# Patient Record
Sex: FEMALE | Race: WHITE | NOT HISPANIC OR LATINO | Employment: FULL TIME
[De-identification: names, ages, dates, MRNs, and addresses within clinical notes are randomized per-mention and may not be internally consistent; named-entity substitution may affect disease eponyms.]

---

## 2017-06-03 ENCOUNTER — HEALTH MAINTENANCE LETTER (OUTPATIENT)
Age: 57
End: 2017-06-03

## 2017-06-21 ENCOUNTER — TELEPHONE (OUTPATIENT)
Dept: PEDIATRICS | Facility: CLINIC | Age: 57
End: 2017-06-21

## 2017-06-21 ENCOUNTER — OFFICE VISIT (OUTPATIENT)
Dept: PEDIATRICS | Facility: CLINIC | Age: 57
End: 2017-06-21
Payer: COMMERCIAL

## 2017-06-21 ENCOUNTER — HOSPITAL ENCOUNTER (OUTPATIENT)
Dept: ULTRASOUND IMAGING | Facility: CLINIC | Age: 57
Discharge: HOME | End: 2017-06-21
Attending: PHYSICIAN ASSISTANT | Admitting: PHYSICIAN ASSISTANT
Payer: COMMERCIAL

## 2017-06-21 ENCOUNTER — NURSE TRIAGE (OUTPATIENT)
Dept: NURSING | Facility: CLINIC | Age: 57
End: 2017-06-21

## 2017-06-21 VITALS
WEIGHT: 293 LBS | OXYGEN SATURATION: 95 % | HEART RATE: 83 BPM | SYSTOLIC BLOOD PRESSURE: 156 MMHG | RESPIRATION RATE: 16 BRPM | HEIGHT: 68 IN | BODY MASS INDEX: 44.41 KG/M2 | TEMPERATURE: 97.9 F | DIASTOLIC BLOOD PRESSURE: 66 MMHG

## 2017-06-21 DIAGNOSIS — M79.89 SWELLING OF LEFT LOWER EXTREMITY: Primary | ICD-10-CM

## 2017-06-21 DIAGNOSIS — M79.89 SWELLING OF LEFT LOWER EXTREMITY: ICD-10-CM

## 2017-06-21 DIAGNOSIS — I80.02 THROMBOPHLEBITIS OF SUPERFICIAL VEINS OF LEFT LOWER EXTREMITY: ICD-10-CM

## 2017-06-21 PROCEDURE — 93971 EXTREMITY STUDY: CPT | Mod: LT

## 2017-06-21 PROCEDURE — 99214 OFFICE O/P EST MOD 30 MIN: CPT | Performed by: PHYSICIAN ASSISTANT

## 2017-06-21 NOTE — NURSING NOTE
"No chief complaint on file.      Initial /66 (BP Location: Right arm, Patient Position: Chair, Cuff Size: Adult Large)  Pulse 83  Temp 97.9  F (36.6  C) (Oral)  Resp 16  LMP 07/09/2007  SpO2 95% Estimated body mass index is 45.15 kg/(m^2) as calculated from the following:    Height as of 1/23/08: 5' 8.25\" (1.734 m).    Weight as of 1/23/08: 299 lb 2 oz (135.7 kg).  Medication Reconciliation: complete   Chiquis Dueñas MA      "

## 2017-06-21 NOTE — TELEPHONE ENCOUNTER
US is NEGATIVE for DVT. Superficial thrombophlebitis noted (measuing 4 cm).   Proceed with consistent NSAID use (600 mg four times daily), heat and elevation.   Follow up with PMD in 5 days--appointment scheduled.   Myles Cage PA-C

## 2017-06-21 NOTE — TELEPHONE ENCOUNTER
"Patient described a 3\" X -6\" red, warm to touch area on (L) thigh. Also, veins near area are red in color. Rated pain \"1\" on a scale from 0-10. Denies any injury.   Reason for Disposition    [1] Red area or streak AND [2] large (> 2 in. or 5 cm)    Additional Information    Negative: Looks like a broken bone or dislocated joint (e.g., crooked or deformed)    Negative: Sounds like a life-threatening emergency to the triager    Negative: Followed a leg injury    Negative: Leg swelling is main symptom    Negative: Back pain radiating (shooting) into leg(s)    Negative: Knee pain is main symptom    Negative: Ankle pain is main symptom    Negative: Pregnant    Negative: Chest pain    Negative: Difficulty breathing    Negative: Entire foot is cool or blue in comparison to other side    Negative: Unable to walk    Negative: [1] Red area or streak AND [2] fever    Negative: [1] Swollen joint AND [2] fever    Negative: [1] Cast on leg or ankle AND [2] now increased pain    Negative: Patient sounds very sick or weak to the triager    Negative: [1] SEVERE pain (e.g., excruciating, unable to do any normal activities) AND [2] not improved after 2 hours of pain medicine    Negative: [1] Thigh or calf pain AND [2] only 1 side AND [3] present > 1 hour    Negative: [1] Thigh, calf, or ankle swelling AND [2] only 1 side    Negative: [1] Thigh, calf, or ankle swelling AND [2] bilateral AND [3] 1 side is more swollen    Protocols used: LEG PAIN-ADULT-    "

## 2017-06-21 NOTE — TELEPHONE ENCOUNTER
Called and spoke with patient. Advised patient to apply heat to site 4-6 times a day for 15-20 minutes. In addition to elevate area. Chiquis Dueñas MA

## 2017-06-21 NOTE — MR AVS SNAPSHOT
After Visit Summary   6/21/2017    Katharina Mejias    MRN: [id number]           Patient Information     Date Of Birth          1960        Visit Information        Provider Department      6/21/2017 11:10 AM Myles Cage PA-C [facility]        Today's Diagnoses     Swelling of left lower extremity    -  1    Thrombophlebitis of superficial veins of left lower extremity          Care Instructions    Proceed to [facility] for US.          Follow-ups after your visit        Your next 10 appointments already scheduled     Jun 21, 2017  2:40 PM CDT   US LOWER EXTREMITY VENOUS DUPLEX LEFT with [id number]   [facility] [place] ([facility] [place] [facility])    [address]  [place] [address] [phone]           Please bring a list of your medicines (including vitamins, minerals and over-the-counter drugs). Also, tell your doctor about any allergies you may have. Wear comfortable clothes and leave your valuables at home.  You do not need to do anything special to prepare for your exam.  Please call the Imaging Department at your exam site with any questions.              Future tests that were ordered for you today     Open Future Orders        Priority Expected Expires Ordered    US Lower Extremity Venous Duplex Left STAT  6/21/2018 6/21/2017            Who to contact     If you have questions or need follow up information about today's clinic visit or your schedule please contact [facility] directly at [phone].  Normal or non-critical lab and imaging results will be communicated to you by MyChart, letter or phone within 4 business days after the clinic has received the results. If you do not hear from us within 7 days, please contact the clinic through MyChart or phone. If you have a critical or abnormal lab result, we will notify you by phone as soon as possible.  Submit refill requests through [employer] or call your  "pharmacy and they will forward the refill request to us. Please allow 3 business days for your refill to be completed.          Additional Information About Your Visit        MyChart Information     [email or web address] gives you secure access to your electronic health record. If you see a primary care provider, you can also send messages to your care team and make appointments. If you have questions, please call your primary care clinic.  If you do not have a primary care provider, please call [phone] and they will assist you.        Care EveryWhere ID     This is your Care EveryWhere ID. This could be used by other organizations to access your [place] medical records  [id number]        Your Vitals Were     Pulse Temperature Respirations Height Last Period Pulse Oximetry    83 97.9  F (36.6  C) (Oral) 16 5' 8\" (1.727 m) 07/09/2007 95%    BMI (Body Mass Index)                   51.58 kg/m2            Blood Pressure from Last 3 Encounters:   06/21/17 156/66   09/08/14 130/80   01/23/08 126/82    Weight from Last 3 Encounters:   06/21/17 (!) 339 lb 3.2 oz (153.9 kg)   01/23/08 299 lb 2 oz (135.7 kg)   07/30/07 (!) 307 lb (139.3 kg)               Primary Care Provider Office Phone # Fax #    Ayla Graf [phone]       [facility] [address] [place] [address]        Equal Access to Services     [facility]: Hadii aad ku hadasho Soomaali, waaxda luqadaha, qaybta kaalmada adeegyada, tutu martin ah. So [facility] [phone].    ATENCIÓN: Si habla español, tiene a quintana disposición servicios gratuitos de asistencia lingüística. Llame al [phone].    We comply with applicable federal civil rights laws and [place] laws. We do not discriminate on the basis of race, color, national origin, age, disability sex, sexual orientation or gender identity.            Thank you!     Thank you for choosing [facility]  for your care. Our goal is always " to provide you with excellent care. Hearing back from our patients is one way we can continue to improve our services. Please take a few minutes to complete the written survey that you may receive in the mail after your visit with us. Thank you!             Your Updated Medication List - Protect others around you: Learn how to safely use, store and throw away your medicines at www.disposemymeds.org.          This list is accurate as of: 6/21/17 12:16 PM.  Always use your most recent med list.                   Brand Name Dispense Instructions for use Diagnosis    albuterol 108 (90 BASE) MCG/ACT Inhaler    PROAIR HFA/PROVENTIL HFA/VENTOLIN HFA    1 Inhaler    Inhale 2 puffs into the lungs every 4 hours as needed for shortness of breath / dyspnea or wheezing    Pneumonia, Wheezing       CENTRUM SILVER 50+WOMEN Tabs      Take 1 tablet by mouth daily        ibuprofen 200 MG tablet    ADVIL/MOTRIN    120    TAKE 1 TO 2 TABLETS EVERY 4 TO 6 HOURS AS NEEDED WITH FOOD        ipratropium - albuterol 0.5 mg/2.5 mg/3 mL 0.5-2.5 (3) MG/3ML neb solution    DUONEB    3 mL    Take 1 vial (3 mLs) by nebulization once for 1 dose    Wheezing       ranitidine 150 MG tablet    ZANTAC     Take 150 mg by mouth as needed for heartburn

## 2017-06-21 NOTE — PROGRESS NOTES
"  SUBJECTIVE:                                                    Katharina Mejias is a 57 year old female who presents to clinic today for the following health issues:  History of ankylosing spondylitis--ibuprofen.     Joint Pain     Onset: 1 day ago    Description:   Location: left shin  Character: warm to touch, red and feels the veins underneath  Sore like a bruise  No pain at rest  Pain with pressure/touch    Intensity: mild    Progression of Symptoms: same    Accompanying Signs & Symptoms:  Other symptoms: no discharge  No open wounds  [place]  No scratches from cat  No streaking from site  No fevers, chills, fatigue   History:   Previous similar pain: no       Precipitating factors:     Trauma or overuse:     Recent travel: [place]    Alleviating factors:  Improved by: nothing    No history of blood clots.   No sob or wheezing.   No cp, palpitations.          Therapies Tried and outcome: takes ibuprofen 12 pills daily    ROS:  C: NEGATIVE for fever, chills  E/M: NEGATIVE for ear, mouth and throat problems  R: NEGATIVE for significant cough or SOB  CV: NEGATIVE for chest pain, palpitations; POSITIVE for chronic peripheral edema  GI: NEGATIVE for nausea, abdominal pain  MUSCULOSKELETAL: NEGATIVE for significant arthralgias or myalgia  NEURO: NEGATIVE for weakness, dizziness or paresthesias    OBJECTIVE:                                                    /66 (BP Location: Right arm, Patient Position: Chair, Cuff Size: Adult Large)  Pulse 83  Temp 97.9  F (36.6  C) (Oral)  Resp 16  Ht 5' 8\" (1.727 m)  Wt (!) 339 lb 3.2 oz (153.9 kg)  LMP 07/09/2007  SpO2 95%  BMI 51.58 kg/m2  Body mass index is 51.58 kg/(m^2).   GENERAL: alert, no distress  NECK: no tenderness, no adenopathy  RESP: lungs clear to auscultation - no rales, no rhonchi, no wheezes  CV: regular rates and rhythm, normal S1 S2, no S3 or S4 and no murmur, no click or rub  SKIN: inspection of the left mid " leg-anterior medial reveals cord like structure measuring 3-4 cm. Tender to palpation. Mild redness and warmth at site. No noticeable swelling although legs bilaterally are diffusely swollen.     Diagnostic test results:  No results found for this or any previous visit (from the past 24 hour(s)).     ASSESSMENT/PLAN:                                                    (M79.89) Swelling of left lower extremity  (primary encounter diagnosis)  Comment: proceed with US to r/o concurrent DVT.   Plan: US Lower Extremity Venous Duplex Left            (I80.02) Thrombophlebitis of superficial veins of left lower extremity  Comment: will discuss treatment options after US results. Patient on high dose NSAIDs chronically.  Plan:       See Patient Instructions    Myles Cage PA-C  [facility]

## 2017-06-26 ENCOUNTER — OFFICE VISIT (OUTPATIENT)
Dept: PEDIATRICS | Facility: CLINIC | Age: 57
End: 2017-06-26
Payer: COMMERCIAL

## 2017-06-26 VITALS
BODY MASS INDEX: 44.41 KG/M2 | WEIGHT: 293 LBS | HEIGHT: 68 IN | DIASTOLIC BLOOD PRESSURE: 84 MMHG | SYSTOLIC BLOOD PRESSURE: 148 MMHG | HEART RATE: 72 BPM | TEMPERATURE: 98.2 F

## 2017-06-26 DIAGNOSIS — I80.02 THROMBOPHLEBITIS OF SUPERFICIAL VEINS OF LEFT LOWER EXTREMITY: Primary | ICD-10-CM

## 2017-06-26 DIAGNOSIS — Z13.6 CARDIOVASCULAR SCREENING; LDL GOAL LESS THAN 160: ICD-10-CM

## 2017-06-26 DIAGNOSIS — I89.0 LYMPHEDEMA OF BOTH LOWER EXTREMITIES: ICD-10-CM

## 2017-06-26 LAB
ERYTHROCYTE [DISTWIDTH] IN BLOOD BY AUTOMATED COUNT: 13.2 % (ref 10–15)
HBA1C MFR BLD: 5.6 % (ref 4.3–6)
HCT VFR BLD AUTO: 41.5 % (ref 35–47)
HGB BLD-MCNC: 13.3 G/DL (ref 11.7–15.7)
MCH RBC QN AUTO: 30 PG (ref 26.5–33)
MCHC RBC AUTO-ENTMCNC: 32 G/DL (ref 31.5–36.5)
MCV RBC AUTO: 94 FL (ref 78–100)
PLATELET # BLD AUTO: 249 10E9/L (ref 150–450)
RBC # BLD AUTO: 4.43 10E12/L (ref 3.8–5.2)
WBC # BLD AUTO: 8.4 10E9/L (ref 4–11)

## 2017-06-26 PROCEDURE — 85027 COMPLETE CBC AUTOMATED: CPT | Performed by: INTERNAL MEDICINE

## 2017-06-26 PROCEDURE — 80053 COMPREHEN METABOLIC PANEL: CPT | Performed by: INTERNAL MEDICINE

## 2017-06-26 PROCEDURE — 36415 COLL VENOUS BLD VENIPUNCTURE: CPT | Performed by: INTERNAL MEDICINE

## 2017-06-26 PROCEDURE — 83036 HEMOGLOBIN GLYCOSYLATED A1C: CPT | Performed by: INTERNAL MEDICINE

## 2017-06-26 PROCEDURE — 99214 OFFICE O/P EST MOD 30 MIN: CPT | Performed by: INTERNAL MEDICINE

## 2017-06-26 PROCEDURE — 84443 ASSAY THYROID STIM HORMONE: CPT | Performed by: INTERNAL MEDICINE

## 2017-06-26 RX ORDER — IBUPROFEN 600 MG/1
600 TABLET, FILM COATED ORAL 4 TIMES DAILY
COMMUNITY
End: 2017-08-13

## 2017-06-26 NOTE — NURSING NOTE
"Chief Complaint   Patient presents with     RECHECK     follow up on suerficial thrombophlebitis       Initial /84 (Cuff Size: Adult Large)  Pulse 72  Temp 98.2  F (36.8  C) (Oral)  Ht 5' 8\" (1.727 m)  Wt (!) 336 lb 14.4 oz (152.8 kg)  LMP 07/09/2007  BMI 51.23 kg/m2 Estimated body mass index is 51.23 kg/(m^2) as calculated from the following:    Height as of this encounter: 5' 8\" (1.727 m).    Weight as of this encounter: 336 lb 14.4 oz (152.8 kg).  Medication Reconciliation: complete   Sally Bowers LPN    "

## 2017-06-26 NOTE — MR AVS SNAPSHOT
After Visit Summary   6/26/2017    Katharina Mejias    MRN: [id number]           Patient Information     Date Of Birth          1960        Visit Information        Provider Department      6/26/2017 10:50 AM Ayla Graf MD [facility]        Today's Diagnoses     Thrombophlebitis of superficial veins of left lower extremity    -  1    Lymphedema of both lower extremities        CARDIOVASCULAR SCREENING; LDL GOAL LESS THAN 160          Care Instructions    Keep doing the warm compresses and keep your leg elevated when you are able.  We should repeat your ultrasound in 6-8 weeks to make sure the clot is dissolving.    I'll let you know how your blood tests look.    I do think you would benefit from seeing a lymphedema specialist. I put in a referral and you will get a call to schedule this.     Come back and see me in 6-8 weeks after your ultrasound. We can review your blood tests and discuss any other preventive measures we could be doing.            Follow-ups after your visit        Additional Services     PHYSICAL THERAPY REFERRAL       *This therapy referral will be filtered to a centralized scheduling office at [facility] and the patient will receive a call to schedule an appointment at a [place] location most convenient for them. *     [facility] provides Physical Therapy evaluation and treatment and many specialty services across the [place] system.  If requesting a specialty program, please choose from the list below.    If you have not heard from the scheduling office within 2 business days, please call [phone] for all locations, with the exception of [place], please call [phone].  Treatment: Evaluation & Treatment  Special Instructions/Modalities: lymphedema evaluate and treat at [facility]  Special Programs:     Please be aware that coverage of these services is subject to the terms and limitations of  "your health insurance plan.  Call member services at your health plan with any benefit or coverage questions.      **Note to Provider:  If you are referring outside of [place] for the therapy appointment, please list the name of the location in the \"special instructions\" above, print the referral and give to the patient to schedule the appointment.                  Future tests that were ordered for you today     Open Future Orders        Priority Expected Expires Ordered    US Lower Extremity Venous Duplex Left Routine 8/21/2017 6/26/2018 6/26/2017            Who to contact     If you have questions or need follow up information about today's clinic visit or your schedule please contact [facility] directly at [phone].  Normal or non-critical lab and imaging results will be communicated to you by MyChart, letter or phone within 4 business days after the clinic has received the results. If you do not hear from us within 7 days, please contact the clinic through [employer] or phone. If you have a critical or abnormal lab result, we will notify you by phone as soon as possible.  Submit refill requests through [employer] or call your pharmacy and they will forward the refill request to us. Please allow 3 business days for your refill to be completed.          Additional Information About Your Visit        MyChart Information     [employer] gives you secure access to your electronic health record. If you see a primary care provider, you can also send messages to your care team and make appointments. If you have questions, please call your primary care clinic.  If you do not have a primary care provider, please call [phone] and they will assist you.        Care EveryWhere ID     This is your Care EveryWhere ID. This could be used by other organizations to access your [place] medical records  [id number]        Your Vitals Were     Pulse Temperature Height Last Period BMI (Body Mass Index)       72 98.2  F " "(36.8  C) (Oral) 5' 8\" (1.727 m) 07/09/2007 51.23 kg/m2        Blood Pressure from Last 3 Encounters:   06/26/17 148/84   06/21/17 156/66   09/08/14 130/80    Weight from Last 3 Encounters:   06/26/17 (!) 336 lb 14.4 oz (152.8 kg)   06/21/17 (!) 339 lb 3.2 oz (153.9 kg)   01/23/08 299 lb 2 oz (135.7 kg)              We Performed the Following     CBC with platelets     Comprehensive metabolic panel     Hemoglobin A1c     PHYSICAL THERAPY REFERRAL     TSH with free T4 reflex        Primary Care Provider Office Phone # Fax #    Ayla Graf [phone]       [facility] [address] [place] [address]        Equal Access to Services     [facility]: Hadii aad ku hadasho Somojgan, waaxda luqadaha, qaybta kaalmada stew, tutu martin . So [facility] [phone].    ATENCIÓN: Si habla español, tiene a quintana disposición servicios gratuitos de asistencia lingüística. Shell al [phone].    We comply with applicable federal civil rights laws and [place] laws. We do not discriminate on the basis of race, color, national origin, age, disability sex, sexual orientation or gender identity.            Thank you!     Thank you for choosing [facility]  for your care. Our goal is always to provide you with excellent care. Hearing back from our patients is one way we can continue to improve our services. Please take a few minutes to complete the written survey that you may receive in the mail after your visit with us. Thank you!             Your Updated Medication List - Protect others around you: Learn how to safely use, store and throw away your medicines at www.disposemymeds.org.          This list is accurate as of: 6/26/17 12:17 PM.  Always use your most recent med list.                   Brand Name Dispense Instructions for use Diagnosis    albuterol 108 (90 BASE) MCG/ACT Inhaler    PROAIR HFA/PROVENTIL HFA/VENTOLIN HFA    1 Inhaler    " Inhale 2 puffs into the lungs every 4 hours as needed for shortness of breath / dyspnea or wheezing    Pneumonia, Wheezing       CENTRUM SILVER 50+WOMEN Tabs      Take 1 tablet by mouth daily        * ibuprofen 600 MG tablet    ADVIL/MOTRIN     Take 600 mg by mouth 4 times daily    CARDIOVASCULAR SCREENING; LDL GOAL LESS THAN 160       * ibuprofen 200 MG tablet    ADVIL/MOTRIN    120    TAKE 1 TO 2 TABLETS EVERY 4 TO 6 HOURS AS NEEDED WITH FOOD        ipratropium - albuterol 0.5 mg/2.5 mg/3 mL 0.5-2.5 (3) MG/3ML neb solution    DUONEB    3 mL    Take 1 vial (3 mLs) by nebulization once for 1 dose    Wheezing       ranitidine 150 MG tablet    ZANTAC     Take 150 mg by mouth as needed for heartburn        * Notice:  This list has 2 medication(s) that are the same as other medications prescribed for you. Read the directions carefully, and ask your doctor or other care provider to review them with you.

## 2017-06-26 NOTE — PATIENT INSTRUCTIONS
Keep doing the warm compresses and keep your leg elevated when you are able.  We should repeat your ultrasound in 6-8 weeks to make sure the clot is dissolving.    I'll let you know how your blood tests look.    I do think you would benefit from seeing a lymphedema specialist. I put in a referral and you will get a call to schedule this.     Come back and see me in 6-8 weeks after your ultrasound. We can review your blood tests and discuss any other preventive measures we could be doing.

## 2017-06-26 NOTE — PROGRESS NOTES
"  SUBJECTIVE:                                                    Katharina Mejias is a 57 year old female who presents to clinic today for the following health issues:    Follow up from visit with Vandana on 6/21/17    Here to followup on superficial thrombophlebitis in lower leg. Seems to be improving, not as tender. No redness.    Problem list and histories reviewed & adjusted, as indicated.  Additional history: as documented    Patient Active Problem List   Diagnosis     Ankylosing spondylitis (H)     CARDIOVASCULAR SCREENING; LDL GOAL LESS THAN 160     ACP (advance care planning)     Past Surgical History:   Procedure Laterality Date     NO HISTORY OF SURGERY         Social History   Substance Use Topics     Smoking status: Current Every Day Smoker     Smokeless tobacco: Not on file     Alcohol use Yes     Family History   Problem Relation Age of Onset     HEART DISEASE Mother      d. age 70 CHF, thought due to very low K     Blood Disease Mother       \"borderline hemophilia\", diagnosed after bleeding ulcer, treated with vit K, then had blood clot     Prostate Cancer Father      HEART DISEASE Maternal Uncle      DIABETES Daughter            Reviewed and updated as needed this visit by clinical staff  Tobacco  Allergies  Meds  Med Hx  Surg Hx  Fam Hx  Soc Hx      Reviewed and updated as needed this visit by Provider         ROS:  Constitutional, HEENT, cardiovascular, pulmonary, GI, , musculoskeletal, neuro, skin, endocrine and psych systems are negative, except as otherwise noted.    OBJECTIVE:     /84 (Cuff Size: Adult Large)  Pulse 72  Temp 98.2  F (36.8  C) (Oral)  Ht 5' 8\" (1.727 m)  Wt (!) 336 lb 14.4 oz (152.8 kg)  LMP 07/09/2007  BMI 51.23 kg/m2  Body mass index is 51.23 kg/(m^2).  GENERAL: healthy, alert and no distress, obese  NECK: no adenopathy, no asymmetry, masses, or scars and thyroid normal to palpation  RESP: lungs clear to auscultation - no rales, rhonchi or wheezes  CV: " regular rate and rhythm, normal S1 S2, no S3 or S4, no murmur, click or rub, peripheral pulses strong  ABDOMEN: soft, nontender, no hepatosplenomegaly, no masses and bowel sounds normal  EXTR: significant lymphedema bilaterally LLE with palpable superficial thrombophlebitis medially. Minimally tender. No redness.     Diagnostic Test Results:  Results for orders placed or performed in visit on 06/26/17   Comprehensive metabolic panel   Result Value Ref Range    Sodium 143 133 - 144 mmol/L    Potassium 4.1 3.4 - 5.3 mmol/L    Chloride 108 94 - 109 mmol/L    Carbon Dioxide 27 20 - 32 mmol/L    Anion Gap 8 3 - 14 mmol/L    Glucose 88 70 - 99 mg/dL    Urea Nitrogen 15 7 - 30 mg/dL    Creatinine 0.79 0.52 - 1.04 mg/dL    GFR Estimate 74 >60 mL/min/1.7m2    GFR Estimate If Black >90   GFR Calc   >60 mL/min/1.7m2    Calcium 9.0 8.5 - 10.1 mg/dL    Bilirubin Total 0.4 0.2 - 1.3 mg/dL    Albumin 3.4 3.4 - 5.0 g/dL    Protein Total 7.0 6.8 - 8.8 g/dL    Alkaline Phosphatase 104 40 - 150 U/L    ALT 28 0 - 50 U/L    AST 15 0 - 45 U/L   CBC with platelets   Result Value Ref Range    WBC 8.4 4.0 - 11.0 10e9/L    RBC Count 4.43 3.8 - 5.2 10e12/L    Hemoglobin 13.3 11.7 - 15.7 g/dL    Hematocrit 41.5 35.0 - 47.0 %    MCV 94 78 - 100 fl    MCH 30.0 26.5 - 33.0 pg    MCHC 32.0 31.5 - 36.5 g/dL    RDW 13.2 10.0 - 15.0 %    Platelet Count 249 150 - 450 10e9/L   TSH with free T4 reflex   Result Value Ref Range    TSH 1.45 0.40 - 4.00 mU/L   Hemoglobin A1c   Result Value Ref Range    Hemoglobin A1C 5.6 4.3 - 6.0 %       ASSESSMENT/PLAN:     1. Thrombophlebitis of superficial veins of left lower extremity  Plan continue management as we are. Plan follow up ultrasound in 6-8 weeks. She will let me know if worsening. Is overdue for preventive visit so I have asked her to schedule this.  - US Lower Extremity Venous Duplex Left; Future    2. Lymphedema of both lower extremities  Significant lymphedema may be contributing and is  difficult for her. Recommended she consider lymphedema therapy/wrapping to help with her symptoms.   - PHYSICAL THERAPY REFERRAL  - OCCUPATIONAL THERAPY REFERRAL    3. CARDIOVASCULAR SCREENING; LDL GOAL LESS THAN 160    - ibuprofen (ADVIL/MOTRIN) 600 MG tablet; Take 600 mg by mouth 4 times daily  - Comprehensive metabolic panel  - CBC with platelets  - TSH with free T4 reflex  - Hemoglobin A1c    Patient Instructions   Keep doing the warm compresses and keep your leg elevated when you are able.  We should repeat your ultrasound in 6-8 weeks to make sure the clot is dissolving.    I'll let you know how your blood tests look.    I do think you would benefit from seeing a lymphedema specialist. I put in a referral and you will get a call to schedule this.     Come back and see me in 6-8 weeks after your ultrasound. We can review your blood tests and discuss any other preventive measures we could be doing.        Ayla Cardona MD  [facility]

## 2017-06-27 ENCOUNTER — TELEPHONE (OUTPATIENT)
Dept: PEDIATRICS | Facility: CLINIC | Age: 57
End: 2017-06-27

## 2017-06-27 DIAGNOSIS — Z71.89 ACP (ADVANCE CARE PLANNING): Chronic | ICD-10-CM

## 2017-06-27 DIAGNOSIS — I89.0 LYMPHEDEMA: Primary | ICD-10-CM

## 2017-06-27 LAB
ALBUMIN SERPL-MCNC: 3.4 G/DL (ref 3.4–5)
ALP SERPL-CCNC: 104 U/L (ref 40–150)
ALT SERPL W P-5'-P-CCNC: 28 U/L (ref 0–50)
ANION GAP SERPL CALCULATED.3IONS-SCNC: 8 MMOL/L (ref 3–14)
AST SERPL W P-5'-P-CCNC: 15 U/L (ref 0–45)
BILIRUB SERPL-MCNC: 0.4 MG/DL (ref 0.2–1.3)
BUN SERPL-MCNC: 15 MG/DL (ref 7–30)
CALCIUM SERPL-MCNC: 9 MG/DL (ref 8.5–10.1)
CHLORIDE SERPL-SCNC: 108 MMOL/L (ref 94–109)
CO2 SERPL-SCNC: 27 MMOL/L (ref 20–32)
CREAT SERPL-MCNC: 0.79 MG/DL (ref 0.52–1.04)
GFR SERPL CREATININE-BSD FRML MDRD: 74 ML/MIN/1.7M2
GLUCOSE SERPL-MCNC: 88 MG/DL (ref 70–99)
POTASSIUM SERPL-SCNC: 4.1 MMOL/L (ref 3.4–5.3)
PROT SERPL-MCNC: 7 G/DL (ref 6.8–8.8)
SODIUM SERPL-SCNC: 143 MMOL/L (ref 133–144)
TSH SERPL DL<=0.005 MIU/L-ACNC: 1.45 MU/L (ref 0.4–4)

## 2017-06-27 NOTE — TELEPHONE ENCOUNTER
Advance Care Planning 6/27/2017: ACP Review of Chart / Resources Provided:  Reviewed chart for advance care plan.  Katharina Mejias has no plan or code status on file. Code status updated and sent to provider for review. Advance Care Planning letter sent.     Please see code status pending and sign if appropriate. Please close encounter when done.     Routing to Dr. Graf.    Added by Ludmila Lagos

## 2017-06-27 NOTE — LETTER
[address] [place] [address]  [phone]      June 27, 2017    Katharina Mejias                                                                                                                                                       [address]  [facility] [address]    Dear Katharina  :    A review of your medical record indicated:      ð    You have no Health Care Directive on file.    We greatly value the opportunity to assist you in documenting your choices and to honor your wishes. We have several options to assist you in completing or updating your document.     Copies of the [employer] Health Care Directive and various informational materials can be viewed and printed for free at our web site: [email or web address]    Free group classes on Advance Care Planning and completing a Health Care Directive are available at multiple locations and times. These 90 minute classes are led by trained facilitators who will provide information and guide you through a Health Care Directive form. They can also review, notarize and add your Health Care Directive to your medical record. [place] for a class at:  [email or web address] or by calling [employer] at [phone] or toll free [phone].     ð  Call me at the contact information listed below for assistance or to make an appointment to discuss creating a Health Care Directive, OR  ð Email us at [email or web address] for questions and assistance with creating a Health Care Directive.    COPIES of completed Health Care Directives can be brought or mailed to any of  our locations including the address listed below. You may also e-mail a copy to: [email or web address]    Sincerely,     Ludmila Lagos RN  Advance Care Planning Facilitator   [address] [place] [address]  [phone]

## 2017-07-26 ENCOUNTER — TELEPHONE (OUTPATIENT)
Dept: PEDIATRICS | Facility: CLINIC | Age: 57
End: 2017-07-26

## 2017-07-26 NOTE — TELEPHONE ENCOUNTER
Type of outreach:  Sent [employer] message.  Health Maintenance Due   Topic Date Due     HEPATITIS C SCREENING  04/02/1977     PAP Q1 YR  05/22/2007     MAMMO Q1 YR  06/19/2007     COLON CANCER SCREEN (SYSTEM ASSIGNED)  04/02/2009     LIPID MONITORING Q5 YEARS  05/22/2011     TETANUS Q10 YR  05/22/2012     Alexus Hanley CMA (AAMA)

## 2017-08-13 ENCOUNTER — OFFICE VISIT (OUTPATIENT)
Dept: URGENT CARE | Facility: URGENT CARE | Age: 57
End: 2017-08-13
Payer: COMMERCIAL

## 2017-08-13 VITALS
TEMPERATURE: 98.3 F | HEART RATE: 87 BPM | BODY MASS INDEX: 43.4 KG/M2 | RESPIRATION RATE: 24 BRPM | SYSTOLIC BLOOD PRESSURE: 142 MMHG | OXYGEN SATURATION: 92 % | WEIGHT: 293 LBS | DIASTOLIC BLOOD PRESSURE: 80 MMHG | HEIGHT: 69 IN

## 2017-08-13 DIAGNOSIS — R06.2 WHEEZING: ICD-10-CM

## 2017-08-13 DIAGNOSIS — R09.89 RHONCHI: ICD-10-CM

## 2017-08-13 DIAGNOSIS — J20.9 ACUTE BRONCHITIS WITH SYMPTOMS > 10 DAYS: Primary | ICD-10-CM

## 2017-08-13 PROCEDURE — 94640 AIRWAY INHALATION TREATMENT: CPT | Performed by: PHYSICIAN ASSISTANT

## 2017-08-13 PROCEDURE — 99214 OFFICE O/P EST MOD 30 MIN: CPT | Mod: 25 | Performed by: PHYSICIAN ASSISTANT

## 2017-08-13 RX ORDER — ALBUTEROL SULFATE 90 UG/1
2 AEROSOL, METERED RESPIRATORY (INHALATION) EVERY 4 HOURS PRN
Qty: 1 INHALER | Refills: 1
Start: 2017-08-13 | End: 2019-10-07

## 2017-08-13 RX ORDER — IPRATROPIUM BROMIDE AND ALBUTEROL SULFATE 2.5; .5 MG/3ML; MG/3ML
1 SOLUTION RESPIRATORY (INHALATION) ONCE
Qty: 3 ML | Refills: 0
Start: 2017-08-13 | End: 2017-10-02

## 2017-08-13 RX ORDER — PREDNISONE 20 MG/1
TABLET ORAL
Qty: 13 TABLET | Refills: 0 | Status: SHIPPED | OUTPATIENT
Start: 2017-08-13 | End: 2017-10-02

## 2017-08-13 RX ORDER — IPRATROPIUM BROMIDE AND ALBUTEROL SULFATE 2.5; .5 MG/3ML; MG/3ML
1 SOLUTION RESPIRATORY (INHALATION) ONCE
Qty: 3 ML | Refills: 0
Start: 2017-08-13 | End: 2017-08-13

## 2017-08-13 RX ORDER — AZITHROMYCIN 250 MG/1
TABLET, FILM COATED ORAL
Qty: 6 TABLET | Refills: 0 | Status: SHIPPED | OUTPATIENT
Start: 2017-08-13 | End: 2017-10-02

## 2017-08-13 NOTE — NURSING NOTE
"Katharina Mejias;   Chief Complaint   Patient presents with     Breathing Problem     cough, onset 6 days ago, smoker, no history of asthma/copd      Urgent Care     Initial /80 (BP Location: Right arm, Patient Position: Chair, Cuff Size: Adult Large)  Pulse 87  Temp 98.3  F (36.8  C) (Oral)  Resp 24  Ht 5' 8.5\" (1.74 m)  Wt (!) 339 lb (153.8 kg)  LMP 07/09/2007  SpO2 92%  BMI 50.8 kg/m2 Estimated body mass index is 50.8 kg/(m^2) as calculated from the following:    Height as of this encounter: 5' 8.5\" (1.74 m).    Weight as of this encounter: 339 lb (153.8 kg)..  BP completed using cuff size large.  Candelaria Douglas R.N.  "

## 2017-08-13 NOTE — MR AVS SNAPSHOT
After Visit Summary   8/13/2017    Katharina Mejias    MRN: [id number]           Patient Information     Date Of Birth          1960        Visit Information        Provider Department      8/13/2017 1:40 PM Sandy Moreau PA-C [facility]        Today's Diagnoses     Acute bronchitis with symptoms > 10 days    -  1    Rhonchi        Wheezing           Follow-ups after your visit        Your next 10 appointments already scheduled     Oct 02, 2017  1:30 PM CDT   [facility]    [address] [place] [address]  [address]  [facility] [id number] [phone]              Who to contact     If you have questions or need follow up information about today's clinic visit or your schedule please contact [facility] directly at [phone].  Normal or non-critical lab and imaging results will be communicated to you by MyChart, letter or phone within 4 business days after the clinic has received the results. If you do not hear from us within 7 days, please contact the clinic through [employer] or phone. If you have a critical or abnormal lab result, we will notify you by phone as soon as possible.  Submit refill requests through [employer] or call your pharmacy and they will forward the refill request to us. Please allow 3 business days for your refill to be completed.          Additional Information About Your Visit        MyChart Information     [employer] gives you secure access to your electronic health record. If you see a primary care provider, you can also send messages to your care team and make appointments. If you have questions, please call your primary care clinic.  If you do not have a primary care provider, please call [phone] and they will assist you.        Care EveryWhere ID     This is your Care EveryWhere ID. This could be used by other organizations to access your [facility]  "records  [id number]        Your Vitals Were     Pulse Temperature Respirations Height Last Period Pulse Oximetry    87 98.3  F (36.8  C) (Oral) 24 5' 8.5\" (1.74 m) 07/09/2007 92%    BMI (Body Mass Index)                   50.8 kg/m2            Blood Pressure from Last 3 Encounters:   08/13/17 142/80   06/26/17 148/84   06/21/17 156/66    Weight from Last 3 Encounters:   08/13/17 (!) 339 lb (153.8 kg)   06/26/17 (!) 336 lb 14.4 oz (152.8 kg)   06/21/17 (!) 339 lb 3.2 oz (153.9 kg)              We Performed the Following     INHALATION/NEBULIZER TREATMENT, INITIAL          Today's Medication Changes          These changes are accurate as of: 8/13/17 11:59 PM.  If you have any questions, ask your nurse or doctor.               Start taking these medicines.        Dose/Directions    azithromycin 250 MG tablet   Commonly known as:  ZITHROMAX   Used for:  Acute bronchitis with symptoms > 10 days   Started by:  Sandy Moreau PA-C        Two tablets first day, then one tablet daily for four days.   Quantity:  6 tablet   Refills:  0       predniSONE 20 MG tablet   Commonly known as:  DELTASONE   Used for:  Rhonchi, Acute bronchitis with symptoms > 10 days   Started by:  Sandy Moreau PA-C        Take 3 tabs qd x 1 day then 2 tabs qd x 5 days   Quantity:  13 tablet   Refills:  0         These medicines have changed or have updated prescriptions.        Dose/Directions    ibuprofen 200 MG tablet   Commonly known as:  ADVIL/MOTRIN   This may have changed:  Another medication with the same name was removed. Continue taking this medication, and follow the directions you see here.   Changed by:  Ayla Graf MD        TAKE 1 TO 2 TABLETS EVERY 4 TO 6 HOURS AS NEEDED WITH FOOD   Quantity:  120   Refills:  0       * ipratropium - albuterol 0.5 mg/2.5 mg/3 mL 0.5-2.5 (3) MG/3ML neb solution   Commonly known as:  DUONEB   This may have changed:  Another medication with the same name was added. Make sure you " understand how and when to take each.   Used for:  Rhonchi   Changed by:  Sandy Moreau PA-C        Dose:  1 vial   Take 1 vial (3 mLs) by nebulization once for 1 dose   Quantity:  3 mL   Refills:  0       * ipratropium - albuterol 0.5 mg/2.5 mg/3 mL 0.5-2.5 (3) MG/3ML neb solution   Commonly known as:  DUONEB   This may have changed:  You were already taking a medication with the same name, and this prescription was added. Make sure you understand how and when to take each.   Used for:  Rhonchi   Changed by:  Sandy Moreau PA-C        Dose:  1 vial   Take 1 vial (3 mLs) by nebulization once for 1 dose   Quantity:  3 mL   Refills:  0       * Notice:  This list has 2 medication(s) that are the same as other medications prescribed for you. Read the directions carefully, and ask your doctor or other care provider to review them with you.      Stop taking these medicines if you haven't already. Please contact your care team if you have questions.     CENTRUM SILVER 50+WOMEN Tabs   Stopped by:  Sandy Moreau PA-C                Where to get your medicines      These medications were sent to [facility] - [place] - [address]  AT [facility]  [address] [place] [address]     Phone:  [phone]     azithromycin 250 MG tablet    predniSONE 20 MG tablet         Some of these will need a paper prescription and others can be bought over the counter.  Ask your nurse if you have questions.     You don't need a prescription for these medications     albuterol 108 (90 BASE) MCG/ACT Inhaler    ipratropium - albuterol 0.5 mg/2.5 mg/3 mL 0.5-2.5 (3) MG/3ML neb solution    ipratropium - albuterol 0.5 mg/2.5 mg/3 mL 0.5-2.5 (3) MG/3ML neb solution                Primary Care Provider Office Phone # Fax #    Ayla Graf [phone]       [facility] [place] [address]        Equal Access to Services     [facility]  AH: Hadii dulce mendozaluciana Rosen, waaxda luqadaha, qaybta kahoang mathias, tutu ruthyin hayaakarina bravobo cifuentes laJuventinomoraima winston. So [facility] [phone].    ATENCIÓN: Si habla espsaul, tiene a quintana disposición servicios gratuitos de asistencia lingüística. Llame al [phone].    We comply with applicable federal civil rights laws and [place] laws. We do not discriminate on the basis of race, color, national origin, age, disability sex, sexual orientation or gender identity.            Thank you!     Thank you for choosing [facility]  for your care. Our goal is always to provide you with excellent care. Hearing back from our patients is one way we can continue to improve our services. Please take a few minutes to complete the written survey that you may receive in the mail after your visit with us. Thank you!             Your Updated Medication List - Protect others around you: Learn how to safely use, store and throw away your medicines at www.disposemymeds.org.          This list is accurate as of: 8/13/17 11:59 PM.  Always use your most recent med list.                   Brand Name Dispense Instructions for use Diagnosis    albuterol 108 (90 BASE) MCG/ACT Inhaler    PROAIR HFA/PROVENTIL HFA/VENTOLIN HFA    1 Inhaler    Inhale 2 puffs into the lungs every 4 hours as needed for shortness of breath / dyspnea or wheezing    Rhonchi, Acute bronchitis with symptoms > 10 days       azithromycin 250 MG tablet    ZITHROMAX    6 tablet    Two tablets first day, then one tablet daily for four days.    Acute bronchitis with symptoms > 10 days       dextromethorphan 15 MG/5ML syrup      Take 10 mLs by mouth 4 times daily as needed for cough        ibuprofen 200 MG tablet    ADVIL/MOTRIN    120    TAKE 1 TO 2 TABLETS EVERY 4 TO 6 HOURS AS NEEDED WITH FOOD        * ipratropium - albuterol 0.5 mg/2.5 mg/3 mL 0.5-2.5 (3) MG/3ML neb solution    DUONEB    3 mL    Take 1 vial (3 mLs) by nebulization once for 1 dose     Rhonchi       * ipratropium - albuterol 0.5 mg/2.5 mg/3 mL 0.5-2.5 (3) MG/3ML neb solution    DUONEB    3 mL    Take 1 vial (3 mLs) by nebulization once for 1 dose    Rhonchi       NYQUIL PO           predniSONE 20 MG tablet    DELTASONE    13 tablet    Take 3 tabs qd x 1 day then 2 tabs qd x 5 days    Rhonchi, Acute bronchitis with symptoms > 10 days       ranitidine 150 MG tablet    ZANTAC     Take 150 mg by mouth as needed for heartburn        * Notice:  This list has 2 medication(s) that are the same as other medications prescribed for you. Read the directions carefully, and ask your doctor or other care provider to review them with you.

## 2017-08-13 NOTE — PROCEDURES
The following nebulizer treatment was given:     MEDICATION: Duoneb  : [employer]  LOT #: [id number]  EXPIRATION DATE:  3/2019  NDC # 7961-0839-83  Jessica Alvarado CMA

## 2017-08-23 ENCOUNTER — TELEPHONE (OUTPATIENT)
Dept: PEDIATRICS | Facility: CLINIC | Age: 57
End: 2017-08-23

## 2017-08-23 NOTE — TELEPHONE ENCOUNTER
Reason for call:  Other   Patient called regarding (reason for call): appointment  Additional comments: follow up on superficial thrombophlebitis      Phone number to reach patient:  Work number on file:  [phone] (work)    Best Time:  Call at work during business hours, call cell in the evenings.     Can we leave a detailed message on this number?  YES

## 2017-08-23 NOTE — TELEPHONE ENCOUNTER
Call to patient. She has an appointment scheduled for a follow up US on Monday, 8/28.  She will do blood work that day as well.  She will await the results.  If needed to be seen, she can come in Monday afternoon, depending on the results.    We can call patient on her cell phone on Monday.  Will route to Dr. Homar CHEW.    Next 5 appointments (look out 90 days)     Oct 02, 2017  1:30 PM CDT   PHYSICAL with Ayla Graf MD   [facility]    [address] [place] [id number] [phone]                Libby Hernandez, MYRTLE  Message handled by Nurse Triage.

## 2017-08-28 ENCOUNTER — HOSPITAL ENCOUNTER (OUTPATIENT)
Dept: ULTRASOUND IMAGING | Facility: CLINIC | Age: 57
Discharge: HOME | End: 2017-08-28
Attending: INTERNAL MEDICINE | Admitting: INTERNAL MEDICINE
Payer: COMMERCIAL

## 2017-08-28 DIAGNOSIS — I80.02 THROMBOPHLEBITIS OF SUPERFICIAL VEINS OF LEFT LOWER EXTREMITY: ICD-10-CM

## 2017-08-28 PROCEDURE — 93971 EXTREMITY STUDY: CPT | Mod: LT

## 2017-09-08 NOTE — PROGRESS NOTES
SUBJECTIVE:   Katharina Mejias is a 57 year old female presenting with a chief complaint of cough and cold sx for over a week.  Cough seems to be getting worse for the past 6 days and now increased wheezing and SOB. No fevers or chest pain.  Has of respiratory issues and does admit to smoking.  Has inhaler but not using recently.  .  Course of illness is worsening.    Severity moderate  Current and Associated symptoms: none  Treatment measures tried include Fluids and Rest.  Predisposing factors include tobacco abuse and hx of respiratory issues   .    Past Medical History:   Diagnosis Date     Acute and subacute iridocyclitis, unspecified 8/05, 4/06     Ankylosing spondylitis (H)      Lumbago      Other and unspecified ovarian cyst 1985     Other cataract      Tobacco use disorder      Current Outpatient Prescriptions   Medication Sig Dispense Refill     dextromethorphan 15 MG/5ML syrup Take 10 mLs by mouth 4 times daily as needed for cough       Pseudoeph-Doxylamine-DM-APAP (NYQUIL PO)        ipratropium - albuterol 0.5 mg/2.5 mg/3 mL (DUONEB) 0.5-2.5 (3) MG/3ML neb solution Take 1 vial (3 mLs) by nebulization once for 1 dose 3 mL 0     predniSONE (DELTASONE) 20 MG tablet Take 3 tabs qd x 1 day then 2 tabs qd x 5 days 13 tablet 0     albuterol (PROAIR HFA/PROVENTIL HFA/VENTOLIN HFA) 108 (90 BASE) MCG/ACT Inhaler Inhale 2 puffs into the lungs every 4 hours as needed for shortness of breath / dyspnea or wheezing 1 Inhaler 1     azithromycin (ZITHROMAX) 250 MG tablet Two tablets first day, then one tablet daily for four days. 6 tablet 0     ranitidine (ZANTAC) 150 MG tablet Take 150 mg by mouth as needed for heartburn       IBUPROFEN 200 MG OR TABS TAKE 1 TO 2 TABLETS EVERY 4 TO 6 HOURS AS NEEDED WITH FOOD 120 0     Social History   Substance Use Topics     Smoking status: Current Every Day Smoker     Smokeless tobacco: Not on file     Alcohol use Yes       ROS:  Review of systems negative except as stated  "above.    OBJECTIVE  :/80 (BP Location: Right arm, Patient Position: Chair, Cuff Size: Adult Large)  Pulse 87  Temp 98.3  F (36.8  C) (Oral)  Resp 24  Ht 5' 8.5\" (1.74 m)  Wt (!) 339 lb (153.8 kg)  LMP 07/09/2007  SpO2 92%  BMI 50.8 kg/m2  GENERAL APPEARANCE: healthy, alert and no distress  EYES: EOMI,  PERRL, conjunctiva clear  HENT: ear canals and TM's normal.  Nose and mouth without ulcers, erythema or lesions  NECK: supple, nontender, no lymphadenopathy  RESP: diffuse wheezing expiratory and course rhonchi scattered throughout with distant breath sounds.    CV: regular rates and rhythm, normal S1 S2, no murmur noted  NEURO: Normal strength and tone, sensory exam grossly normal,  normal speech and mentation  SKIN: no suspicious lesions or rashes    NEB:  Duo neb in given in the clinic x 2 treatments.   Post neb wheezing persists but much improved and course rhonchi improved.  Patient states that feels better and can take much deeper breath in .      assessment/plan:  (J20.9) Acute bronchitis with symptoms > 10 days  (primary encounter diagnosis)  Comment:   Plan: predniSONE (DELTASONE) 20 MG tablet, albuterol         (PROAIR HFA/PROVENTIL HFA/VENTOLIN HFA) 108 (90        BASE) MCG/ACT Inhaler, azithromycin (ZITHROMAX)        250 MG tablet        Med as directed and OTC med for sx to relief.  Prednisone burst as directed and albuterol as needed.  Due to underlying smoking and hx of respiratory issues will cover with Zithromax.  Red flag signs discussed and to RTC if sx worsen or new sx develop.  Encouraged to stop smoking.  FU with PCP as needed.      (R09.89) Rhonchi  Comment:   Plan: ipratropium - albuterol 0.5 mg/2.5 mg/3 mL         (DUONEB) 0.5-2.5 (3) MG/3ML neb solution,         INHALATION/NEBULIZER TREATMENT, INITIAL,         ipratropium - albuterol 0.5 mg/2.5 mg/3 mL         (DUONEB) 0.5-2.5 (3) MG/3ML neb solution,         predniSONE (DELTASONE) 20 MG tablet, albuterol         (PROAIR " HFA/PROVENTIL HFA/VENTOLIN HFA) 108 (90        BASE) MCG/ACT Inhaler            (R06.2) Wheezing  Comment:   Plan: as above    \

## 2017-10-02 ENCOUNTER — OFFICE VISIT (OUTPATIENT)
Dept: PEDIATRICS | Facility: CLINIC | Age: 57
End: 2017-10-02
Payer: COMMERCIAL

## 2017-10-02 VITALS
SYSTOLIC BLOOD PRESSURE: 134 MMHG | BODY MASS INDEX: 44.41 KG/M2 | HEIGHT: 68 IN | HEART RATE: 73 BPM | WEIGHT: 293 LBS | TEMPERATURE: 97.8 F | OXYGEN SATURATION: 96 % | DIASTOLIC BLOOD PRESSURE: 78 MMHG

## 2017-10-02 DIAGNOSIS — Z23 NEED FOR PROPHYLACTIC VACCINATION AND INOCULATION AGAINST INFLUENZA: ICD-10-CM

## 2017-10-02 DIAGNOSIS — Z13.6 CARDIOVASCULAR SCREENING; LDL GOAL LESS THAN 160: ICD-10-CM

## 2017-10-02 DIAGNOSIS — Z01.419 ENCOUNTER FOR GYNECOLOGICAL EXAMINATION WITHOUT ABNORMAL FINDING: Primary | ICD-10-CM

## 2017-10-02 DIAGNOSIS — I89.0 LYMPHEDEMA: ICD-10-CM

## 2017-10-02 DIAGNOSIS — E66.01 MORBID OBESITY (H): ICD-10-CM

## 2017-10-02 DIAGNOSIS — Z12.31 VISIT FOR SCREENING MAMMOGRAM: ICD-10-CM

## 2017-10-02 DIAGNOSIS — I10 ESSENTIAL HYPERTENSION: ICD-10-CM

## 2017-10-02 DIAGNOSIS — Z12.4 SCREENING FOR MALIGNANT NEOPLASM OF CERVIX: ICD-10-CM

## 2017-10-02 DIAGNOSIS — Z23 NEED FOR PROPHYLACTIC VACCINATION WITH TETANUS-DIPHTHERIA (TD): ICD-10-CM

## 2017-10-02 PROCEDURE — 36415 COLL VENOUS BLD VENIPUNCTURE: CPT | Performed by: INTERNAL MEDICINE

## 2017-10-02 PROCEDURE — 99213 OFFICE O/P EST LOW 20 MIN: CPT | Mod: 25 | Performed by: INTERNAL MEDICINE

## 2017-10-02 PROCEDURE — 99396 PREV VISIT EST AGE 40-64: CPT | Mod: 25 | Performed by: INTERNAL MEDICINE

## 2017-10-02 PROCEDURE — G0145 SCR C/V CYTO,THINLAYER,RESCR: HCPCS | Performed by: INTERNAL MEDICINE

## 2017-10-02 PROCEDURE — G0202 SCR MAMMO BI INCL CAD: HCPCS | Mod: TC

## 2017-10-02 PROCEDURE — 87624 HPV HI-RISK TYP POOLED RSLT: CPT | Performed by: INTERNAL MEDICINE

## 2017-10-02 PROCEDURE — 90715 TDAP VACCINE 7 YRS/> IM: CPT | Performed by: INTERNAL MEDICINE

## 2017-10-02 PROCEDURE — 90472 IMMUNIZATION ADMIN EACH ADD: CPT | Performed by: INTERNAL MEDICINE

## 2017-10-02 PROCEDURE — 80061 LIPID PANEL: CPT | Performed by: INTERNAL MEDICINE

## 2017-10-02 PROCEDURE — 90686 IIV4 VACC NO PRSV 0.5 ML IM: CPT | Performed by: INTERNAL MEDICINE

## 2017-10-02 PROCEDURE — 90471 IMMUNIZATION ADMIN: CPT | Performed by: INTERNAL MEDICINE

## 2017-10-02 RX ORDER — HYDROCHLOROTHIAZIDE 12.5 MG/1
12.5 TABLET ORAL DAILY
Qty: 30 TABLET | Refills: 1 | Status: SHIPPED | OUTPATIENT
Start: 2017-10-02 | End: 2017-11-24

## 2017-10-02 NOTE — PATIENT INSTRUCTIONS
Preventive Health Recommendations  Female Ages 50 - 64    Yearly exam: See your health care provider every year in order to  o Review health changes.   o Discuss preventive care.    o Review your medicines if your doctor has prescribed any.      Get a Pap test every three years (unless you have an abnormal result and your provider advises testing more often).    If you get Pap tests with HPV test, you only need to test every 5 years, unless you have an abnormal result.     You do not need a Pap test if your uterus was removed (hysterectomy) and you have not had cancer.    You should be tested each year for STDs (sexually transmitted diseases) if you're at risk.     Have a mammogram every 1 to 2 years.    Have a colonoscopy at age 50, or have a yearly FIT test (stool test). These exams screen for colon cancer.      Have a cholesterol test every 5 years, or more often if advised.    Have a diabetes test (fasting glucose) every three years. If you are at risk for diabetes, you should have this test more often.     If you are at risk for osteoporosis (brittle bone disease), think about having a bone density scan (DEXA).    Shots: Get a flu shot each year. Get a tetanus shot every 10 years.    Nutrition:     Eat at least 5 servings of fruits and vegetables each day.    Eat whole-grain bread, whole-wheat pasta and brown rice instead of white grains and rice.    Talk to your provider about Calcium and Vitamin D.     Lifestyle    Exercise at least 150 minutes a week (30 minutes a day, 5 days a week). This will help you control your weight and prevent disease.    Limit alcohol to one drink per day.    No smoking.     Wear sunscreen to prevent skin cancer.     See your dentist every six months for an exam and cleaning.    See your eye doctor every 1 to 2 years.    Congratulations on your weight loss! If your weight stops going down, come back and see me.    Let's start 12.5 mg hydrochlorothiazide once daily in the morning.  Follow up with me in 1 month.    Try to get some exercise in every day.    I do think you will get benefit from starting to do wraps or other treatments for your leg swelling.    Think about making your work schedule manageable for YOU.    Further down on the list:  A sleep study.   Smoking

## 2017-10-02 NOTE — MR AVS SNAPSHOT
After Visit Summary   10/2/2017    Katharina Mejias    MRN: [id number]           Patient Information     Date Of Birth          1960        Visit Information        Provider Department      10/2/2017 1:30 PM Ayla Graf MD [facility]        Today's Diagnoses     Encounter for gynecological examination without abnormal finding    -  1    Lymphedema        Essential hypertension        Visit for screening mammogram        Screening for malignant neoplasm of cervix        Need for prophylactic vaccination and inoculation against influenza        Need for prophylactic vaccination with tetanus-diphtheria (TD)        CARDIOVASCULAR SCREENING; LDL GOAL LESS THAN 160          Care Instructions      Preventive Health Recommendations  Female Ages 50 - 64    Yearly exam: See your health care provider every year in order to  o Review health changes.   o Discuss preventive care.    o Review your medicines if your doctor has prescribed any.      Get a Pap test every three years (unless you have an abnormal result and your provider advises testing more often).    If you get Pap tests with HPV test, you only need to test every 5 years, unless you have an abnormal result.     You do not need a Pap test if your uterus was removed (hysterectomy) and you have not had cancer.    You should be tested each year for STDs (sexually transmitted diseases) if you're at risk.     Have a mammogram every 1 to 2 years.    Have a colonoscopy at age 50, or have a yearly FIT test (stool test). These exams screen for colon cancer.      Have a cholesterol test every 5 years, or more often if advised.    Have a diabetes test (fasting glucose) every three years. If you are at risk for diabetes, you should have this test more often.     If you are at risk for osteoporosis (brittle bone disease), think about having a bone density scan (DEXA).    Shots: Get a flu shot each year. Get a tetanus shot every 10  years.    Nutrition:     Eat at least 5 servings of fruits and vegetables each day.    Eat whole-grain bread, whole-wheat pasta and brown rice instead of white grains and rice.    Talk to your provider about Calcium and Vitamin D.     Lifestyle    Exercise at least 150 minutes a week (30 minutes a day, 5 days a week). This will help you control your weight and prevent disease.    Limit alcohol to one drink per day.    No smoking.     Wear sunscreen to prevent skin cancer.     See your dentist every six months for an exam and cleaning.    See your eye doctor every 1 to 2 years.    Congratulations on your weight loss! If your weight stops going down, come back and see me.    Let's start 12.5 mg hydrochlorothiazide once daily in the morning. Follow up with me in 1 month.    Try to get some exercise in every day.    I do think you will get benefit from starting to do wraps or other treatments for your leg swelling.    Think about making your work schedule manageable for YOU.    Further down on the list:  A sleep study.   Smoking            Follow-ups after your visit        Who to contact     If you have questions or need follow up information about today's clinic visit or your schedule please contact [facility] directly at [phone].  Normal or non-critical lab and imaging results will be communicated to you by DrinkSendohart, letter or phone within 4 business days after the clinic has received the results. If you do not hear from us within 7 days, please contact the clinic through DrinkSendohart or phone. If you have a critical or abnormal lab result, we will notify you by phone as soon as possible.  Submit refill requests through Quantros or call your pharmacy and they will forward the refill request to us. Please allow 3 business days for your refill to be completed.          Additional Information About Your Visit        Quantros Information     Quantros gives you secure access to your electronic health record. If  "you see a primary care provider, you can also send messages to your care team and make appointments. If you have questions, please call your primary care clinic.  If you do not have a primary care provider, please call [phone] and they will assist you.        Care EveryWhere ID     This is your Care EveryWhere ID. This could be used by other organizations to access your [place] medical records  [id number]        Your Vitals Were     Pulse Temperature Height Last Period Pulse Oximetry BMI (Body Mass Index)    73 97.8  F (36.6  C) (Oral) 5' 7.72\" (1.72 m) 07/09/2007 96% 50.89 kg/m2       Blood Pressure from Last 3 Encounters:   10/02/17 134/78   08/13/17 142/80   06/26/17 148/84    Weight from Last 3 Encounters:   10/02/17 (!) 331 lb 14.4 oz (150.5 kg)   08/13/17 (!) 339 lb (153.8 kg)   06/26/17 (!) 336 lb 14.4 oz (152.8 kg)              We Performed the Following          ADMIN VACCINE, ADDL [54478]          ADMIN VACCINE, FIRST [25884]     HC FLU VAC PRESRV FREE QUAD SPLIT VIR 3+YRS IM  [69646]     HPV High Risk Types DNA Cervical     Lipid panel reflex to direct LDL     Pap imaged thin layer screen with HPV - recommended age 30 - 65 years (select HPV order below)     TDAP VACCINE (ADACEL)          Today's Medication Changes          These changes are accurate as of: 10/2/17  3:49 PM.  If you have any questions, ask your nurse or doctor.               Start taking these medicines.        Dose/Directions    hydrochlorothiazide 12.5 MG Tabs tablet   Used for:  Essential hypertension   Started by:  Ayla Graf MD        Dose:  12.5 mg   Take 1 tablet (12.5 mg) by mouth daily   Quantity:  30 tablet   Refills:  1            Where to get your medicines      These medications were sent to [facility] [place], [place] - [address] AT [facility] [address], [facility] [address]    Hours:  24-hours Phone:  [phone]     hydrochlorothiazide 12.5 MG Tabs tablet    "             Primary Care Provider Office Phone # Fax #    Ayla Graf [phone] [address] [place] [address]        Equal Access to Services     MARVINBREANNA LETY : Alpesh dulce bustos crystal Rosen, wajamisonda luqadaha, qaybta kaalmada stew, tutu cifuentes larafaelkarina tish. So [facility] [phone].    ATENCIÓN: Si habla español, tiene a quintana disposición servicios gratuitos de asistencia lingüística. Llame al [phone].    We comply with applicable federal civil rights laws and [place] laws. We do not discriminate on the basis of race, color, national origin, age, disability, sex, sexual orientation, or gender identity.            Thank you!     Thank you for choosing [facility]  for your care. Our goal is always to provide you with excellent care. Hearing back from our patients is one way we can continue to improve our services. Please take a few minutes to complete the written survey that you may receive in the mail after your visit with us. Thank you!             Your Updated Medication List - Protect others around you: Learn how to safely use, store and throw away your medicines at www.disposemymeds.org.          This list is accurate as of: 10/2/17  3:49 PM.  Always use your most recent med list.                   Brand Name Dispense Instructions for use Diagnosis    albuterol 108 (90 BASE) MCG/ACT Inhaler    PROAIR HFA/PROVENTIL HFA/VENTOLIN HFA    1 Inhaler    Inhale 2 puffs into the lungs every 4 hours as needed for shortness of breath / dyspnea or wheezing    Rhonchi, Acute bronchitis with symptoms > 10 days       hydrochlorothiazide 12.5 MG Tabs tablet     30 tablet    Take 1 tablet (12.5 mg) by mouth daily    Essential hypertension       ibuprofen 200 MG tablet    ADVIL/MOTRIN    120    600 mg tid        ranitidine 150 MG tablet    ZANTAC     Take 150 mg by mouth as needed for heartburn

## 2017-10-02 NOTE — NURSING NOTE
"Chief Complaint   Patient presents with     Physical       Initial /78 (Cuff Size: Adult Large)  Pulse 73  Temp 97.8  F (36.6  C) (Oral)  Ht 5' 7.72\" (1.72 m)  Wt (!) 331 lb 14.4 oz (150.5 kg)  LMP 07/09/2007  SpO2 96%  BMI 50.89 kg/m2 Estimated body mass index is 50.89 kg/(m^2) as calculated from the following:    Height as of this encounter: 5' 7.72\" (1.72 m).    Weight as of this encounter: 331 lb 14.4 oz (150.5 kg).  Medication Reconciliation: complete   Sally Bowers LPN    "

## 2017-10-02 NOTE — PROGRESS NOTES
SUBJECTIVE:   CC: Katharina Mejias is an 57 year old woman who presents for preventive health visit.     Physical   Annual:     Getting at least 3 servings of Calcium per day::  Yes    Bi-annual eye exam::  Yes    Dental care twice a year::  NO    Sleep apnea or symptoms of sleep apnea::  None    Diet::  Regular (no restrictions)    Frequency of exercise::  None    Taking medications regularly::  Yes    Medication side effects::  Not applicable    Additional concerns today::  No    Very stressful job. January through April is mandatory 6 days a week, 12-14 hour days.    Hypertension      Outpatient blood pressures are not being checked.    Low Salt Diet: not monitoring salt    Lymphedema is stable. Superficial thrombophlebitis resolved.    Discussed weight management. She has lost some weight since her last visit and she was heartily congratulated on that. She reports the most difficult thing is that her job is so many hours that she has been unable to find the time to take care of herself. She reports that her employers really need her and that there is no one else that can do what she does.         Today's PHQ-2 Score: PHQ-2 ( 1999 Pfizer) 10/2/2017   Q1: Little interest or pleasure in doing things 0   Q2: Feeling down, depressed or hopeless 0   PHQ-2 Score 0   Q1: Little interest or pleasure in doing things Not at all   Q2: Feeling down, depressed or hopeless Not at all   PHQ-2 Score 0       Abuse: Current or Past(Physical, Sexual or Emotional)- No  Do you feel safe in your environment - Yes    Social History   Substance Use Topics     Smoking status: Current Every Day Smoker     Smokeless tobacco: Never Used     Alcohol use Yes     The patient does not drink >3 drinks per day nor >7 drinks per week.    Reviewed orders with patient.  Reviewed health maintenance and updated orders accordingly - Yes  BP Readings from Last 3 Encounters:   10/02/17 134/78   08/13/17 142/80   06/26/17 148/84    Wt Readings from  "Last 3 Encounters:   10/02/17 (!) 331 lb 14.4 oz (150.5 kg)   08/13/17 (!) 339 lb (153.8 kg)   06/26/17 (!) 336 lb 14.4 oz (152.8 kg)                  Patient Active Problem List   Diagnosis     Ankylosing spondylitis (H)     CARDIOVASCULAR SCREENING; LDL GOAL LESS THAN 160     ACP (advance care planning)     Essential hypertension     Past Surgical History:   Procedure Laterality Date     NO HISTORY OF SURGERY         Social History   Substance Use Topics     Smoking status: Current Every Day Smoker     Smokeless tobacco: Never Used     Alcohol use Yes     Family History   Problem Relation Age of Onset     HEART DISEASE Mother      d. age 70 CHF, thought due to very low K     Blood Disease Mother       \"borderline hemophilia\", diagnosed after bleeding ulcer, treated with vit K, then had blood clot     Prostate Cancer Father      HEART DISEASE Maternal Uncle      DIABETES Daughter          Current Outpatient Prescriptions   Medication Sig Dispense Refill     hydrochlorothiazide 12.5 MG TABS tablet Take 1 tablet (12.5 mg) by mouth daily 30 tablet 1     albuterol (PROAIR HFA/PROVENTIL HFA/VENTOLIN HFA) 108 (90 BASE) MCG/ACT Inhaler Inhale 2 puffs into the lungs every 4 hours as needed for shortness of breath / dyspnea or wheezing 1 Inhaler 1     ranitidine (ZANTAC) 150 MG tablet Take 150 mg by mouth as needed for heartburn       IBUPROFEN 200 MG OR TABS 600 mg tid 120 0     Recent Labs   Lab Test  10/02/17   1603  06/26/17   1221   A1C   --   5.6   LDL  129*   --    HDL  42*   --    TRIG  220*   --    ALT   --   28   CR   --   0.79   GFRESTIMATED   --   74   GFRESTBLACK   --   >90   GFR Calc     POTASSIUM   --   4.1   TSH   --   1.45              Patient over age 50, mutual decision to screen reflected in health maintenance.      Pertinent mammograms are reviewed under the imaging tab.  History of abnormal Pap smear: NO - age 30- 65 PAP every 3 years recommended    Reviewed and updated as needed this " "visit by clinical staffTobacco  Allergies  Meds  Med Hx  Surg Hx  Fam Hx  Soc Hx        Reviewed and updated as needed this visit by Provider              ROS:  C: NEGATIVE for fever, chills, change in weight  I: NEGATIVE for worrisome rashes, moles or lesions  E: NEGATIVE for vision changes or irritation  ENT: NEGATIVE for ear, mouth and throat problems  R: NEGATIVE for significant cough or SOB  B: NEGATIVE for masses, tenderness or discharge  CV: NEGATIVE for chest pain, palpitations  GI: NEGATIVE for nausea, abdominal pain, heartburn, or change in bowel habits  : NEGATIVE for unusual urinary or vaginal symptoms. No vaginal bleeding.  M: NEGATIVE for significant arthralgias or myalgia  N: NEGATIVE for weakness, dizziness or paresthesias  P: NEGATIVE for changes in mood or affect      OBJECTIVE:   /78 (Cuff Size: Adult Large)  Pulse 73  Temp 97.8  F (36.6  C) (Oral)  Ht 5' 7.72\" (1.72 m)  Wt (!) 331 lb 14.4 oz (150.5 kg)  LMP 07/09/2007  SpO2 96%  BMI 50.89 kg/m2  EXAM:  GENERAL: healthy, alert and no distress  EYES: Eyes grossly normal to inspection, PERRL and conjunctivae and sclerae normal  HENT: ear canals and TM's normal, nose and mouth without ulcers or lesions  NECK: no adenopathy, no asymmetry, masses, or scars and thyroid normal to palpation  RESP: lungs clear to auscultation - no rales, rhonchi or wheezes  BREAST: normal without masses, tenderness or nipple discharge and no palpable axillary masses or adenopathy  CV: regular rate and rhythm, normal S1 S2, no S3 or S4, no murmur, click or rub, 3+ bilateral lymphedema to below knees.  ABDOMEN: soft, nontender, no hepatosplenomegaly, no masses and bowel sounds normal   (female): normal female external genitalia, normal urethral meatus, vaginal mucosa pink, moist, well rugated, and normal cervix/adnexa/uterus without masses or discharge  MS: no gross musculoskeletal defects noted, no edema  SKIN: no suspicious lesions or rashes  NEURO: " Normal strength and tone, mentation intact and speech normal  PSYCH: mentation appears normal, affect normal/bright    ASSESSMENT/PLAN:   1. Encounter for gynecological examination without abnormal finding      2. Lymphedema  Discussed management strategies, most importantly weight loss. Also wrapping or using compression hose.     3. Essential hypertension  Mild elevation. Start hctz, may also help avoid excess edema in setting of chronic lymphedema. Discussed that she will likely not notice a significant change in her lymphedema.  - hydrochlorothiazide 12.5 MG TABS tablet; Take 1 tablet (12.5 mg) by mouth daily  Dispense: 30 tablet; Refill: 1    4. Morbid obesity (H)  Discussed the ultimate importance of working on this. Discussed seeing a therapist to help her work on priorities, because she has been living on the premise that doing a good job at work is the most important thing. Discussed the importance of making her health the most important thing. She states she will consider. Hasn't been to a provider in many years so feels she can only take on a certain amount at a given time. Will meet again to discuss.    5. Visit for screening mammogram    - MA SCREENING DIGITAL BILAT - Future  (s+30); Future    6. Screening for malignant neoplasm of cervix    - Pap imaged thin layer screen with HPV - recommended age 30 - 65 years (select HPV order below)  - HPV High Risk Types DNA Cervical    7. Need for prophylactic vaccination and inoculation against influenza    - HC FLU VAC PRESRV FREE QUAD SPLIT VIR 3+YRS IM  [68730]  -      ADMIN VACCINE, FIRST [47133]  -      ADMIN VACCINE, ADDL [32060]    8. Need for prophylactic vaccination with tetanus-diphtheria (TD)    - TDAP VACCINE (ADACEL)    9. CARDIOVASCULAR SCREENING; LDL GOAL LESS THAN 160    - Lipid panel reflex to direct LDL; Future  - Lipid panel reflex to direct LDL        COUNSELING:  Reviewed preventive health counseling, as reflected in patient  "instructions         reports that she has been smoking.  She has never used smokeless tobacco.  Tobacco Cessation Action Plan: Information offered: Patient not interested at this time  Estimated body mass index is 50.89 kg/(m^2) as calculated from the following:    Height as of this encounter: 5' 7.72\" (1.72 m).    Weight as of this encounter: 331 lb 14.4 oz (150.5 kg).   Weight management plan: Discussed healthy diet and exercise guidelines and patient will follow up in 3 months in clinic to re-evaluate.    Counseling Resources:  ATP IV Guidelines  Pooled Cohorts Equation Calculator  Breast Cancer Risk Calculator  FRAX Risk Assessment  ICSI Preventive Guidelines  Dietary Guidelines for Americans, 2010  USDA's MyPlate  ASA Prophylaxis  Lung CA Screening    Ayla Cardona MD  [facility]  "

## 2017-10-03 LAB
CHOLEST SERPL-MCNC: 215 MG/DL
HDLC SERPL-MCNC: 42 MG/DL
LDLC SERPL CALC-MCNC: 129 MG/DL
NONHDLC SERPL-MCNC: 173 MG/DL
TRIGL SERPL-MCNC: 220 MG/DL

## 2017-10-05 LAB
COPATH REPORT: NORMAL
PAP: NORMAL

## 2017-10-09 LAB
FINAL DIAGNOSIS: NORMAL
HPV HR 12 DNA CVX QL NAA+PROBE: NEGATIVE
HPV16 DNA SPEC QL NAA+PROBE: NEGATIVE
HPV18 DNA SPEC QL NAA+PROBE: NEGATIVE
SPECIMEN DESCRIPTION: NORMAL

## 2017-10-27 ENCOUNTER — TELEPHONE (OUTPATIENT)
Dept: PEDIATRICS | Facility: CLINIC | Age: 57
End: 2017-10-27

## 2017-10-27 DIAGNOSIS — I10 ESSENTIAL HYPERTENSION: Primary | ICD-10-CM

## 2017-10-27 NOTE — TELEPHONE ENCOUNTER
Patient request for blood panel. States was instructed by Dr. Graf to follow up with blood pressure check and labs following a 1 month medication use. Please add orders and call to confirm at [phone]. Would like to be scheduled for 11/7 before or after her BP check. Okay to leave detailed message.    Central Scheduling  Eliza TAYLOR

## 2017-10-30 NOTE — TELEPHONE ENCOUNTER
Huddled with Dr Graf and added BMP, please call and schedule lab and nurse only appt.  Sally Bowers LPN

## 2017-11-07 ENCOUNTER — ALLIED HEALTH/NURSE VISIT (OUTPATIENT)
Dept: NURSING | Facility: CLINIC | Age: 57
End: 2017-11-07
Payer: COMMERCIAL

## 2017-11-07 VITALS — DIASTOLIC BLOOD PRESSURE: 70 MMHG | SYSTOLIC BLOOD PRESSURE: 128 MMHG

## 2017-11-07 DIAGNOSIS — I10 ESSENTIAL HYPERTENSION: ICD-10-CM

## 2017-11-07 DIAGNOSIS — I10 ESSENTIAL HYPERTENSION: Primary | ICD-10-CM

## 2017-11-07 LAB
ANION GAP SERPL CALCULATED.3IONS-SCNC: 5 MMOL/L (ref 3–14)
BUN SERPL-MCNC: 19 MG/DL (ref 7–30)
CALCIUM SERPL-MCNC: 9.3 MG/DL (ref 8.5–10.1)
CHLORIDE SERPL-SCNC: 107 MMOL/L (ref 94–109)
CO2 SERPL-SCNC: 29 MMOL/L (ref 20–32)
CREAT SERPL-MCNC: 0.84 MG/DL (ref 0.52–1.04)
GFR SERPL CREATININE-BSD FRML MDRD: 70 ML/MIN/1.7M2
GLUCOSE SERPL-MCNC: 106 MG/DL (ref 70–99)
POTASSIUM SERPL-SCNC: 4.3 MMOL/L (ref 3.4–5.3)
SODIUM SERPL-SCNC: 141 MMOL/L (ref 133–144)

## 2017-11-07 PROCEDURE — 80048 BASIC METABOLIC PNL TOTAL CA: CPT | Performed by: INTERNAL MEDICINE

## 2017-11-07 PROCEDURE — 99207 ZZC NO CHARGE NURSE ONLY: CPT

## 2017-11-07 PROCEDURE — 36415 COLL VENOUS BLD VENIPUNCTURE: CPT | Performed by: INTERNAL MEDICINE

## 2017-11-07 NOTE — PROGRESS NOTES
Katharina Mejias is a 57 year old female who comes in today for a Blood Pressure check because of new medication.    *Document pulse and BP  *Use new set of vitals button for multiple readings.  *Use extended vitals for orthostatic    Vitals as recorded, a large cuff was used.    Patient is taking medication as prescribed  Patient is tolerating medications well.  Patient is not monitoring Blood Pressure at home.  Average readings if yes are n/a    Current complaints: none    Disposition: results routed to MD/AP and patient to continue with the same medication

## 2017-11-07 NOTE — NURSING NOTE
"Chief Complaint   Patient presents with     Allied Health Visit     bp check       Initial /70 (BP Location: Right arm, Patient Position: Chair, Cuff Size: Adult Large)  LMP 07/09/2007 Estimated body mass index is 50.89 kg/(m^2) as calculated from the following:    Height as of 10/2/17: 5' 7.72\" (1.72 m).    Weight as of 10/2/17: 331 lb 14.4 oz (150.5 kg).  Medication Reconciliation: complete   Ana Gaytan MA 8:28 AM 11/7/2017     "

## 2017-11-07 NOTE — MR AVS SNAPSHOT
After Visit Summary   11/7/2017    Katharina Mejias    MRN: [id number]           Patient Information     Date Of Birth          1960        Visit Information        Provider Department      11/7/2017 8:30 AM [facility]        Today's Diagnoses     Essential hypertension    -  1       Follow-ups after your visit        Follow-up notes from your care team     Return for BP Recheck.      Your next 10 appointments already scheduled     Nov 07, 2017  8:50 AM CST   LAB with EA LAB   [facility] ([facility])    [address]  [address]  [facility] [id number] [phone]           Please do not eat 10-12 hours before your appointment if you are coming in fasting for labs on lipids, cholesterol, or glucose (sugar). This does not apply to pregnant women. Water, hot tea and black coffee (with nothing added) are okay. Do not drink other fluids, diet soda or chew gum.              Who to contact     If you have questions or need follow up information about today's clinic visit or your schedule please contact [facility] directly at [phone].  Normal or non-critical lab and imaging results will be communicated to you by LocalVox Mediahart, letter or phone within 4 business days after the clinic has received the results. If you do not hear from us within 7 days, please contact the clinic through Divvyshott or phone. If you have a critical or abnormal lab result, we will notify you by phone as soon as possible.  Submit refill requests through Miproto or call your pharmacy and they will forward the refill request to us. Please allow 3 business days for your refill to be completed.          Additional Information About Your Visit        LocalVox Mediahart Information     Miproto gives you secure access to your electronic health record. If you see a primary care provider, you can also send messages to your care team and make appointments. If you have  questions, please call your primary care clinic.  If you do not have a primary care provider, please call [phone] and they will assist you.        Care EveryWhere ID     This is your Care EveryWhere ID. This could be used by other organizations to access your [place] medical records  [id number]        Your Vitals Were     Last Period                   07/09/2007            Blood Pressure from Last 3 Encounters:   11/07/17 128/70   10/02/17 134/78   08/13/17 142/80    Weight from Last 3 Encounters:   10/02/17 (!) 331 lb 14.4 oz (150.5 kg)   08/13/17 (!) 339 lb (153.8 kg)   06/26/17 (!) 336 lb 14.4 oz (152.8 kg)              Today, you had the following     No orders found for display       Primary Care Provider Office Phone # Fax #    Ayla Graf [phone] [address] [place] [address]        Equal Access to Services     [facility]: Hadii aad ku hadasho Soomaali, waaxda luqadaha, qaybta kaalmada adeegyada, waxay idiin hayaan shellyeg kharaalie la'katlinn . So [facility] [phone].    ATENCIÓN: Si habla español, tiene a quintana disposición servicios gratuitos de asistencia lingüística. Llame al [phone].    We comply with applicable federal civil rights laws and [place] laws. We do not discriminate on the basis of race, color, national origin, age, disability, sex, sexual orientation, or gender identity.            Thank you!     Thank you for choosing [facility]  for your care. Our goal is always to provide you with excellent care. Hearing back from our patients is one way we can continue to improve our services. Please take a few minutes to complete the written survey that you may receive in the mail after your visit with us. Thank you!             Your Updated Medication List - Protect others around you: Learn how to safely use, store and throw away your medicines at www.disposemymeds.org.          This list is accurate as of: 11/7/17  8:39 AM.  Always use  your most recent med list.                   Brand Name Dispense Instructions for use Diagnosis    albuterol 108 (90 BASE) MCG/ACT Inhaler    PROAIR HFA/PROVENTIL HFA/VENTOLIN HFA    1 Inhaler    Inhale 2 puffs into the lungs every 4 hours as needed for shortness of breath / dyspnea or wheezing    Rhonchi, Acute bronchitis with symptoms > 10 days       hydrochlorothiazide 12.5 MG Tabs tablet     30 tablet    Take 1 tablet (12.5 mg) by mouth daily    Essential hypertension       ibuprofen 200 MG tablet    ADVIL/MOTRIN    120    600 mg tid        ranitidine 150 MG tablet    ZANTAC     Take 150 mg by mouth as needed for heartburn

## 2017-11-24 DIAGNOSIS — I10 ESSENTIAL HYPERTENSION: ICD-10-CM

## 2017-11-27 RX ORDER — HYDROCHLOROTHIAZIDE 12.5 MG/1
12.5 TABLET ORAL DAILY
Qty: 30 TABLET | Refills: 0 | Status: SHIPPED | OUTPATIENT
Start: 2017-11-27 | End: 2017-12-22

## 2017-11-27 NOTE — TELEPHONE ENCOUNTER
Requested Prescriptions   Pending Prescriptions Disp Refills     hydrochlorothiazide 12.5 MG TABS tablet 30 tablet 1     Sig: Take 1 tablet (12.5 mg) by mouth daily    Diuretics (Including Combos) Protocol Passed    11/24/2017  8:12 PM       Passed - Blood pressure under 140/90    BP Readings from Last 3 Encounters:   11/07/17 128/70   10/02/17 134/78   08/13/17 142/80                Passed - Recent or future visit with authorizing provider's specialty    Patient had office visit in the last year or has a visit in the next 30 days with authorizing provider.  See chart review.              Passed - Patient is age 18 or older       Passed - No active pregancy on record       Passed - Normal serum creatinine on file in past 12 months    Recent Labs   Lab Test  11/07/17   0845   CR  0.84             Passed - Normal serum potassium on file in past 12 months    Recent Labs   Lab Test  11/07/17   0845   POTASSIUM  4.3                   Passed - Normal serum sodium on file in past 12 months    Recent Labs   Lab Test  11/07/17   0845   NA  141             Passed - No positive pregnancy test in past 12 months        Prescription approved per [facility] Refill Protocol.

## 2017-12-22 DIAGNOSIS — I10 ESSENTIAL HYPERTENSION: ICD-10-CM

## 2017-12-22 NOTE — TELEPHONE ENCOUNTER
Requested Prescriptions   Pending Prescriptions Disp Refills     hydrochlorothiazide 12.5 MG TABS tablet    Last Written Prescription Date:  11/27/2017  Last Fill Quantity: 30,  # refills: 0   Last Office Visit with [facility], P or [facility] prescribing provider:  10/2/2017   Future Office Visit:      30 tablet 0     Sig: Take 1 tablet (12.5 mg) by mouth daily    Diuretics (Including Combos) Protocol Passed    12/22/2017  8:02 AM       Passed - Blood pressure under 140/90    BP Readings from Last 3 Encounters:   11/07/17 128/70   10/02/17 134/78   08/13/17 142/80                Passed - Recent or future visit with authorizing provider's specialty    Patient had office visit in the last year or has a visit in the next 30 days with authorizing provider.  See chart review.              Passed - Patient is age 18 or older       Passed - No active pregancy on record       Passed - Normal serum creatinine on file in past 12 months    Recent Labs   Lab Test  11/07/17   0845   CR  0.84             Passed - Normal serum potassium on file in past 12 months    Recent Labs   Lab Test  11/07/17   0845   POTASSIUM  4.3                   Passed - Normal serum sodium on file in past 12 months    Recent Labs   Lab Test  11/07/17   0845   NA  141             Passed - No positive pregnancy test in past 12 months

## 2017-12-27 RX ORDER — HYDROCHLOROTHIAZIDE 12.5 MG/1
12.5 TABLET ORAL DAILY
Qty: 30 TABLET | Refills: 0 | Status: SHIPPED | OUTPATIENT
Start: 2017-12-27 | End: 2018-01-18

## 2017-12-27 NOTE — TELEPHONE ENCOUNTER
Medication is being filled for 1 time refill only due to:  due for BP follow up January.   Please call patient to help schedule this.   Day Fletcher RN  Triage Nurse

## 2017-12-28 NOTE — TELEPHONE ENCOUNTER
Patient calls back, advised and appointment scheduled:  Next 5 appointments (look out 90 days)     Jan 22, 2018  8:10 AM CST   SHORT with Ayla Graf MD   [facility] ([facility])    [address] [place] [address] [phone]                Libby Hernandez RN  Message handled by Nurse Triage.

## 2018-01-18 DIAGNOSIS — I10 ESSENTIAL HYPERTENSION: ICD-10-CM

## 2018-01-18 RX ORDER — HYDROCHLOROTHIAZIDE 12.5 MG/1
12.5 TABLET ORAL DAILY
Qty: 30 TABLET | Refills: 0 | Status: SHIPPED | OUTPATIENT
Start: 2018-01-18 | End: 2018-04-20

## 2018-01-18 NOTE — TELEPHONE ENCOUNTER
Patient calls.  She has a  to go to and needs to cancel her appointment for Monday for blood pressure follow up-she needs to drive to [place] for the .  We rescheduled appointment for the next available with PCP for 18.  Patient needs a refill on her hctz.      Last refill was on , please fill for patient.  Unable to fill per RN protocol, routing to a covering provider to fill.  ( she is not sure when she is returning)    BP Readings from Last 3 Encounters:   17 128/70   10/02/17 134/78   17 142/80     Potassium   Date Value Ref Range Status   2017 4.3 3.4 - 5.3 mmol/L Final   ]  Creatinine   Date Value Ref Range Status   2017 0.84 0.52 - 1.04 mg/dL Final   ]    Libby Hernandez, RN  Message handled by Nurse Triage.

## 2018-02-15 ENCOUNTER — OFFICE VISIT (OUTPATIENT)
Dept: PEDIATRICS | Facility: CLINIC | Age: 58
End: 2018-02-15
Payer: COMMERCIAL

## 2018-02-15 VITALS
SYSTOLIC BLOOD PRESSURE: 118 MMHG | WEIGHT: 293 LBS | HEIGHT: 67 IN | HEART RATE: 105 BPM | OXYGEN SATURATION: 95 % | TEMPERATURE: 100.6 F | DIASTOLIC BLOOD PRESSURE: 72 MMHG | BODY MASS INDEX: 45.99 KG/M2

## 2018-02-15 DIAGNOSIS — J06.9 UPPER RESPIRATORY TRACT INFECTION, UNSPECIFIED TYPE: ICD-10-CM

## 2018-02-15 DIAGNOSIS — J11.1 INFLUENZA-LIKE ILLNESS: Primary | ICD-10-CM

## 2018-02-15 LAB
FLUAV+FLUBV AG SPEC QL: NEGATIVE
FLUAV+FLUBV AG SPEC QL: NEGATIVE
SPECIMEN SOURCE: NORMAL

## 2018-02-15 PROCEDURE — 99213 OFFICE O/P EST LOW 20 MIN: CPT | Performed by: INTERNAL MEDICINE

## 2018-02-15 PROCEDURE — 87804 INFLUENZA ASSAY W/OPTIC: CPT | Performed by: INTERNAL MEDICINE

## 2018-02-15 RX ORDER — OSELTAMIVIR PHOSPHATE 75 MG/1
75 CAPSULE ORAL 2 TIMES DAILY
Qty: 10 CAPSULE | Refills: 0 | Status: SHIPPED | OUTPATIENT
Start: 2018-02-15 | End: 2018-02-20

## 2018-02-15 NOTE — MR AVS SNAPSHOT
After Visit Summary   2/15/2018    Katharina Mejias    MRN: [id number]           Patient Information     Date Of Birth          1960        Visit Information        Provider Department      2/15/2018 11:40 AM Iram El MD [facility]        Today's Diagnoses     Influenza-like illness    -  1    URI (upper respiratory infection)          Care Instructions    1. Flu negative, but recommend treating with influenza exposure  2. tamiflu 1 pill twice daily for 5 days  3. Rest, push fluids  4. Follow-up if worsening symptoms          Follow-ups after your visit        Your next 10 appointments already scheduled     Feb 20, 2018  8:10 AM CST   SHORT with Ayla Graf MD   [facility] ([facility])    [address]  [address]  [facility] [address] [phone]              Who to contact     If you have questions or need follow up information about today's clinic visit or your schedule please contact [facility] directly at [phone].  Normal or non-critical lab and imaging results will be communicated to you by IntegenXhart, letter or phone within 4 business days after the clinic has received the results. If you do not hear from us within 7 days, please contact the clinic through [employer] or phone. If you have a critical or abnormal lab result, we will notify you by phone as soon as possible.  Submit refill requests through Liftopia or call your pharmacy and they will forward the refill request to us. Please allow 3 business days for your refill to be completed.          Additional Information About Your Visit        IntegenXhart Information     Liftopia gives you secure access to your electronic health record. If you see a primary care provider, you can also send messages to your care team and make appointments. If you have questions, please call your primary care clinic.  If you do not have a primary care provider, please call  "[phone] and they will assist you.        Care EveryWhere ID     This is your Care EveryWhere ID. This could be used by other organizations to access your [facility] medical records  [id number]        Your Vitals Were     Pulse Temperature Height Last Period Pulse Oximetry BMI (Body Mass Index)    105 100.6  F (38.1  C) (Tympanic) 5' 7.25\" (1.708 m) 07/09/2007 95% 51.88 kg/m2       Blood Pressure from Last 3 Encounters:   02/15/18 118/72   11/07/17 128/70   10/02/17 134/78    Weight from Last 3 Encounters:   02/15/18 (!) 333 lb 11.2 oz (151.4 kg)   10/02/17 (!) 331 lb 14.4 oz (150.5 kg)   08/13/17 (!) 339 lb (153.8 kg)              We Performed the Following     Influenza A/B antigen          Today's Medication Changes          These changes are accurate as of 2/15/18 12:22 PM.  If you have any questions, ask your nurse or doctor.               Start taking these medicines.        Dose/Directions    oseltamivir 75 MG capsule   Commonly known as:  TAMIFLU   Used for:  Influenza-like illness   Started by:  Iram El MD        Dose:  75 mg   Take 1 capsule (75 mg) by mouth 2 times daily   Quantity:  10 capsule   Refills:  0            Where to get your medicines      These medications were sent to [facility] - [address]   [address], [place] [address]     Phone:  [phone]     oseltamivir 75 MG capsule                Primary Care Provider Office Phone # Fax #    Ayla Graf [phone]       [facility] [place] [address]        Equal Access to Services     [facility]: Hadii dulce Rosen, wajamisonda luqadaha, qajesusta kaalmada stew, tutu winston. So [facility] [phone].    ATENCIÓN: Si habla español, tiene a quintana disposición servicios gratuitos de asistencia lingüística. Llame al [phone].    We comply with applicable federal civil rights laws and " [place] laws. We do not discriminate on the basis of race, color, national origin, age, disability, sex, sexual orientation, or gender identity.            Thank you!     Thank you for choosing [facility]  for your care. Our goal is always to provide you with excellent care. Hearing back from our patients is one way we can continue to improve our services. Please take a few minutes to complete the written survey that you may receive in the mail after your visit with us. Thank you!             Your Updated Medication List - Protect others around you: Learn how to safely use, store and throw away your medicines at www.disposemymeds.org.          This list is accurate as of 2/15/18 12:22 PM.  Always use your most recent med list.                   Brand Name Dispense Instructions for use Diagnosis    albuterol 108 (90 BASE) MCG/ACT Inhaler    PROAIR HFA/PROVENTIL HFA/VENTOLIN HFA    1 Inhaler    Inhale 2 puffs into the lungs every 4 hours as needed for shortness of breath / dyspnea or wheezing    Rhonchi, Acute bronchitis with symptoms > 10 days       hydrochlorothiazide 12.5 MG Tabs tablet     30 tablet    Take 1 tablet (12.5 mg) by mouth daily Due for an appointment for further refills. Please call to schedule.    Essential hypertension       ibuprofen 200 MG tablet    ADVIL/MOTRIN    120    600 mg tid        oseltamivir 75 MG capsule    TAMIFLU    10 capsule    Take 1 capsule (75 mg) by mouth 2 times daily    Influenza-like illness       ranitidine 150 MG tablet    ZANTAC     Take 150 mg by mouth as needed for heartburn

## 2018-02-15 NOTE — PROGRESS NOTES
"  SUBJECTIVE:   Katharina Mejias is a 57 year old female who presents to clinic today for the following health issues:    RESPIRATORY SYMPTOMS      Duration: 1 day    Description  nasal congestion, rhinorrhea, cough, wheezing, fever, chills, headache, hoarse voice and diarrhea    Severity: severe    Accompanying signs and symptoms: None    History (predisposing factors):  tobacco abuse    Precipitating or alleviating factors: None    Therapies tried and outcome:  rest and fluids Nyquil    58 y/o F with h/o obesity, hypertension and wheezing with viral illnesses presents with 1 day of symptoms after known Influenza A exposure from daughter. Daughter's  also sick. Started yesterday with cough and fever today up to 99.9. Temp here is 100.9. Took 3 advil at 9 am. No body aches. A little diarrhea this am. Had the flu shot this year.     Reviewed and updated as needed this visit by clinical staff  Tobacco  Allergies  Meds  Problems  Med Hx  Surg Hx  Fam Hx  Soc Hx        Reviewed and updated as needed this visit by Provider  Allergies  Meds  Problems       -------------------------------------    ROS:  Constitutional, HEENT, cardiovascular, pulmonary, gi and gu systems are negative, except as otherwise noted.    OBJECTIVE:                                                    /72 (BP Location: Right arm, Patient Position: Sitting, Cuff Size: Adult Regular)  Pulse 105  Temp 100.6  F (38.1  C) (Tympanic)  Ht 5' 7.25\" (1.708 m)  Wt (!) 333 lb 11.2 oz (151.4 kg)  LMP 07/09/2007  SpO2 95%  BMI 51.88 kg/m2   Body mass index is 51.88 kg/(m^2).  General Appearance: tired appearing, obese, alert and no distress  Eyes:   no discharge, erythema.  Normal pupils.  Both Ears: normal: no effusions, no erythema, normal landmarks  Nose: congested  Sinuses:  Non-tender  Oropharynx: moderate erythema, will clear PND  Neck: Supple.  No adenopathy, no asymmetry, masses  Respiratory: lungs clear to auscultation - " no rales, rhonchi or wheezes. Frequent coughing with taking a deep breath  Cardiovascular: regular rate and rhythm, normal S1 S2, no S3 or S4 and no murmur, click or rub.  No peripheral edema.  Skin: no rashes or lesions.  Well perfused and normal turgor.    Diagnostic Test Results:  Results for orders placed or performed in visit on 02/15/18 (from the past 24 hour(s))   Influenza A/B antigen   Result Value Ref Range    Influenza A/B Agn Specimen Nasopharyngeal     Influenza A Negative NEG^Negative    Influenza B Negative NEG^Negative        ASSESSMENT/PLAN:                                                      (R69) Influenza-like illness  (primary encounter diagnosis)  Comment: flu test negative, but known influenza A exposure with influenza-like symptoms. She is also a 56 y/o smoker with a history of wheezing, so treatment recommended  Plan: oseltamivir (TAMIFLU) 75 MG capsule  - tamiflu bid for 5 days - discussed possible side effects  - discussed supportive cares and typical course of influenza  - discussed signs/symptoms of secondary infections and encouraged return if develops new fevers after current fevers resolve, shortness of breath or not improving in 7-10 days  - discussed no work until without fever for 24 hours without medications    (J06.9) Upper respiratory tract infection, unspecified type  Plan: Influenza A/B antigen    Follow up with Provider - has f/u scheduled with Dr. Graf and as needed     [facility]

## 2018-02-15 NOTE — PATIENT INSTRUCTIONS
1. Flu negative, but recommend treating with influenza exposure  2. tamiflu 1 pill twice daily for 5 days  3. Rest, push fluids  4. Follow-up if worsening symptoms

## 2018-02-20 ENCOUNTER — OFFICE VISIT (OUTPATIENT)
Dept: PEDIATRICS | Facility: CLINIC | Age: 58
End: 2018-02-20
Payer: COMMERCIAL

## 2018-02-20 VITALS
TEMPERATURE: 98.1 F | BODY MASS INDEX: 45.99 KG/M2 | OXYGEN SATURATION: 94 % | HEIGHT: 67 IN | HEART RATE: 73 BPM | WEIGHT: 293 LBS | DIASTOLIC BLOOD PRESSURE: 82 MMHG | SYSTOLIC BLOOD PRESSURE: 122 MMHG

## 2018-02-20 DIAGNOSIS — I10 ESSENTIAL HYPERTENSION: ICD-10-CM

## 2018-02-20 DIAGNOSIS — J11.1 INFLUENZA-LIKE ILLNESS: ICD-10-CM

## 2018-02-20 DIAGNOSIS — E78.5 HYPERLIPIDEMIA LDL GOAL <130: Primary | ICD-10-CM

## 2018-02-20 DIAGNOSIS — R06.2 WHEEZING: ICD-10-CM

## 2018-02-20 DIAGNOSIS — M45.9 ANKYLOSING SPONDYLITIS, UNSPECIFIED SITE OF SPINE (H): ICD-10-CM

## 2018-02-20 DIAGNOSIS — E66.01 MORBID OBESITY (H): ICD-10-CM

## 2018-02-20 PROCEDURE — 99214 OFFICE O/P EST MOD 30 MIN: CPT | Performed by: INTERNAL MEDICINE

## 2018-02-20 RX ORDER — HYDROCHLOROTHIAZIDE 12.5 MG/1
12.5 TABLET ORAL DAILY
Qty: 90 TABLET | Refills: 0 | Status: CANCELLED | OUTPATIENT
Start: 2018-02-20

## 2018-02-20 RX ORDER — AZITHROMYCIN 250 MG/1
TABLET, FILM COATED ORAL
Qty: 6 TABLET | Refills: 0 | Status: SHIPPED | OUTPATIENT
Start: 2018-02-20 | End: 2018-04-20

## 2018-02-20 RX ORDER — HYDROCHLOROTHIAZIDE 25 MG/1
25 TABLET ORAL DAILY
Qty: 90 TABLET | Refills: 1 | Status: SHIPPED | OUTPATIENT
Start: 2018-02-20 | End: 2018-05-21

## 2018-02-20 RX ORDER — ROSUVASTATIN CALCIUM 5 MG/1
5 TABLET, COATED ORAL DAILY
Qty: 90 TABLET | Refills: 1 | Status: SHIPPED | OUTPATIENT
Start: 2018-02-20 | End: 2018-05-21

## 2018-02-20 NOTE — PATIENT INSTRUCTIONS
Let's give it 1 week. If you stop improving, or are still needing albuterol twice a day, take the zithromax I sent to your pharmacy.  1 month nonfasting lab for potassium and kidney.  3 months fasting lab for cholestserol and liver.    Continue to work on balance in your lifestyle, making sure you are not working so many hours that you don't have time for yourself.    Set up an appointment with our rheumatologist, Dr. Cotton. This is to take a look at how much ongoing inflammation or joint destruction may be going on and discussing what to do to protect you later on.     I would like to see you again in 3 months after your bloodwork.

## 2018-02-20 NOTE — NURSING NOTE
"Chief Complaint   Patient presents with     Hypertension       Initial /82 (Cuff Size: Adult Large)  Pulse 73  Temp 98.1  F (36.7  C) (Oral)  Ht 5' 7.25\" (1.708 m)  Wt (!) 334 lb 4.8 oz (151.6 kg)  LMP 07/09/2007  SpO2 94%  BMI 51.97 kg/m2 Estimated body mass index is 51.97 kg/(m^2) as calculated from the following:    Height as of this encounter: 5' 7.25\" (1.708 m).    Weight as of this encounter: 334 lb 4.8 oz (151.6 kg).  Medication Reconciliation: complete   Sally Bowers LPN    "

## 2018-02-20 NOTE — MR AVS SNAPSHOT
After Visit Summary   2/20/2018    Katharina Mejias    MRN: [id number]           Patient Information     Date Of Birth          1960        Visit Information        Provider Department      2/20/2018 8:10 AM Ayla Graf MD [facility]        Today's Diagnoses     Hyperlipidemia LDL goal <130    -  1    Essential hypertension        Ankylosing spondylitis, unspecified site of spine (H)        Influenza-like illness        Wheezing          Care Instructions    Let's give it 1 week. If you stop improving, or are still needing albuterol twice a day, take the zithromax I sent to your pharmacy.  1 month nonfasting lab for potassium and kidney.  3 months fasting lab for cholestserol and liver.    Continue to work on balance in your lifestyle, making sure you are not working so many hours that you don't have time for yourself.    Set up an appointment with our rheumatologist, Dr. Cotton. This is to take a look at how much ongoing inflammation or joint destruction may be going on and discussing what to do to protect you later on.     I would like to see you again in 3 months after your bloodwork.           Follow-ups after your visit        Future tests that were ordered for you today     Open Future Orders        Priority Expected Expires Ordered    **ESR FUTURE anytime Routine 2/20/2018 2/20/2019 2/20/2018    CRP inflammation Routine  2/20/2019 2/20/2018    **Comprehensive metabolic panel FUTURE anytime Routine 2/20/2018 2/20/2019 2/20/2018    Lipid panel reflex to direct LDL Fasting Routine 2/20/2018 2/20/2019 2/20/2018    **Basic metabolic panel FUTURE anytime Routine 3/20/2018 2/20/2019 2/20/2018            Who to contact     If you have questions or need follow up information about today's clinic visit or your schedule please contact [facility] directly at [phone].  Normal or non-critical lab and imaging results will be communicated to you by MyChart, letter  "or phone within 4 business days after the clinic has received the results. If you do not hear from us within 7 days, please contact the clinic through QuikC[employer] or phone. If you have a critical or abnormal lab result, we will notify you by phone as soon as possible.  Submit refill requests through [employer] or call your pharmacy and they will forward the refill request to us. Please allow 3 business days for your refill to be completed.          Additional Information About Your Visit        QuikCycle Information     [employer] lets you send messages to your doctor, view your test results, renew your prescriptions, schedule appointments and more. To sign up, go to [email or web address] . Click on \"Log in\" on the left side of the screen, which will take you to the Welcome page. Then click on \"Sign up Now\" on the right side of the page.     You will be asked to enter the access code listed below, as well as some personal information. Please follow the directions to create your username and password.     Your access code is: [email or web address]  Expires: 2018  8:52 AM     Your access code will  in 90 days. If you need help or a new code, please call your [facility] clinic or [phone].        Care EveryWhere ID     This is your Care EveryWhere ID. This could be used by other organizations to access your [facility] medical records  [id number]        Your Vitals Were     Pulse Temperature Height Last Period Pulse Oximetry BMI (Body Mass Index)    73 98.1  F (36.7  C) (Oral) 5' 7.25\" (1.708 m) 2007 94% 51.97 kg/m2       Blood Pressure from Last 3 Encounters:   18 122/82   02/15/18 118/72   17 128/70    Weight from Last 3 Encounters:   18 (!) 334 lb 4.8 oz (151.6 kg)   02/15/18 (!) 333 lb 11.2 oz (151.4 kg)   10/02/17 (!) 331 lb 14.4 oz (150.5 kg)                 Today's Medication Changes          These changes are accurate as of 18  8:52 AM.  If you have any questions, ask your nurse or doctor. "               Start taking these medicines.        Dose/Directions    azithromycin 250 MG tablet   Commonly known as:  ZITHROMAX   Used for:  Influenza-like illness, Wheezing   Started by:  Ayla Graf MD        Two tablets first day, then one tablet daily for four days.   Quantity:  6 tablet   Refills:  0       rosuvastatin 5 MG tablet   Commonly known as:  CRESTOR   Used for:  Hyperlipidemia LDL goal <130, Essential hypertension   Started by:  Ayla Graf MD        Dose:  5 mg   Take 1 tablet (5 mg) by mouth daily   Quantity:  90 tablet   Refills:  1         These medicines have changed or have updated prescriptions.        Dose/Directions    * hydrochlorothiazide 12.5 MG Tabs tablet   This may have changed:  Another medication with the same name was added. Make sure you understand how and when to take each.   Used for:  Essential hypertension   Changed by:  Ayla Graf MD        Dose:  12.5 mg   Take 1 tablet (12.5 mg) by mouth daily Due for an appointment for further refills. Please call to schedule.   Quantity:  30 tablet   Refills:  0       * hydrochlorothiazide 25 MG tablet   Commonly known as:  HYDRODIURIL   This may have changed:  You were already taking a medication with the same name, and this prescription was added. Make sure you understand how and when to take each.   Used for:  Essential hypertension, Hyperlipidemia LDL goal <130   Changed by:  Ayla Graf MD        Dose:  25 mg   Take 1 tablet (25 mg) by mouth daily   Quantity:  90 tablet   Refills:  1       * Notice:  This list has 2 medication(s) that are the same as other medications prescribed for you. Read the directions carefully, and ask your doctor or other care provider to review them with you.         Where to get your medicines      These medications were sent to [facility] [address] [place], [place] - [address] AT [facility] [address], [facility] [address]    Hours:   24-hours Phone:  [phone]     azithromycin 250 MG tablet    hydrochlorothiazide 25 MG tablet    rosuvastatin 5 MG tablet                Primary Care Provider Office Phone # Fax #    Ayla Graf -[phone] [address] [place] [address]        Equal Access to Services     [facility]: Hadii aad ku hadasho Soomaali, waaxda luqadaha, qaybta kaalmada adeegyada, waxay ruthyin haykatlinn adebo cifuentes veronica . So [facility] [phone].    ATENCIÓN: Si habla español, tiene a quintana disposición servicios gratuitos de asistencia lingüística. [facility] [phone].    We comply with applicable federal civil rights laws and [place] laws. We do not discriminate on the basis of race, color, national origin, age, disability, sex, sexual orientation, or gender identity.            Thank you!     Thank you for choosing [facility]  for your care. Our goal is always to provide you with excellent care. Hearing back from our patients is one way we can continue to improve our services. Please take a few minutes to complete the written survey that you may receive in the mail after your visit with us. Thank you!             Your Updated Medication List - Protect others around you: Learn how to safely use, store and throw away your medicines at www.disposemymeds.org.          This list is accurate as of 2/20/18  8:52 AM.  Always use your most recent med list.                   Brand Name Dispense Instructions for use Diagnosis    albuterol 108 (90 BASE) MCG/ACT Inhaler    PROAIR HFA/PROVENTIL HFA/VENTOLIN HFA    1 Inhaler    Inhale 2 puffs into the lungs every 4 hours as needed for shortness of breath / dyspnea or wheezing    Rhonchi, Acute bronchitis with symptoms > 10 days       azithromycin 250 MG tablet    ZITHROMAX    6 tablet    Two tablets first day, then one tablet daily for four days.    Influenza-like illness, Wheezing       * hydrochlorothiazide 12.5 MG Tabs tablet     30  tablet    Take 1 tablet (12.5 mg) by mouth daily Due for an appointment for further refills. Please call to schedule.    Essential hypertension       * hydrochlorothiazide 25 MG tablet    HYDRODIURIL    90 tablet    Take 1 tablet (25 mg) by mouth daily    Essential hypertension, Hyperlipidemia LDL goal <130       ibuprofen 200 MG tablet    ADVIL/MOTRIN    120    600 mg tid        ranitidine 150 MG tablet    ZANTAC     Take 150 mg by mouth as needed for heartburn        rosuvastatin 5 MG tablet    CRESTOR    90 tablet    Take 1 tablet (5 mg) by mouth daily    Hyperlipidemia LDL goal <130, Essential hypertension       * Notice:  This list has 2 medication(s) that are the same as other medications prescribed for you. Read the directions carefully, and ask your doctor or other care provider to review them with you.

## 2018-02-20 NOTE — PROGRESS NOTES
"  SUBJECTIVE:   Katharina Mejias is a 57 year old female who presents to clinic today for the following health issues:      Hypertension Follow-up      Outpatient blood pressures are not being checked.    Low Salt Diet: no added salt    Amount of exercise or physical activity: None    Problems taking medications regularly: No    Medication side effects: none    Diet: regular (no restrictions)    Discuss elevated cholesterol and medicine to control cholesterol.    Gets iritis every 2 years. Not currently on any treatment except for ibuprofen. Has not seen a rheumatologist in many years. Is hesitant to do medications, but discussed importance of reducing inflammation.     Had influenza like illness diagnosed 5 days ago. Doing better, but still wheezing. Using albuterol several times a day. Feeling a bit more short of breath than usual.     Reports she is working a slight reduction in hours after our last visit. Is working on balance, but is a slow process for her.    Problem list and histories reviewed & adjusted, as indicated.  Additional history: as documented    Patient Active Problem List   Diagnosis     Ankylosing spondylitis (H)     CARDIOVASCULAR SCREENING; LDL GOAL LESS THAN 160     ACP (advance care planning)     Essential hypertension     Morbid obesity (H)     Past Surgical History:   Procedure Laterality Date     NO HISTORY OF SURGERY         Social History   Substance Use Topics     Smoking status: Current Every Day Smoker     Smokeless tobacco: Never Used     Alcohol use Yes     Family History   Problem Relation Age of Onset     HEART DISEASE Mother      d. age 70 CHF, thought due to very low K     Blood Disease Mother       \"borderline hemophilia\", diagnosed after bleeding ulcer, treated with vit K, then had blood clot     Prostate Cancer Father      HEART DISEASE Maternal Uncle      DIABETES Daughter          Current Outpatient Prescriptions   Medication Sig Dispense Refill     hydrochlorothiazide " "12.5 MG TABS tablet Take 1 tablet (12.5 mg) by mouth daily Due for an appointment for further refills. Please call to schedule. 30 tablet 0     albuterol (PROAIR HFA/PROVENTIL HFA/VENTOLIN HFA) 108 (90 BASE) MCG/ACT Inhaler Inhale 2 puffs into the lungs every 4 hours as needed for shortness of breath / dyspnea or wheezing 1 Inhaler 1     ranitidine (ZANTAC) 150 MG tablet Take 150 mg by mouth as needed for heartburn       IBUPROFEN 200 MG OR TABS 600 mg tid 120 0     Recent Labs   Lab Test  11/07/17   0845  10/02/17   1603  06/26/17   1221   A1C   --    --   5.6   LDL   --   129*   --    HDL   --   42*   --    TRIG   --   220*   --    ALT   --    --   28   CR  0.84   --   0.79   GFRESTIMATED  70   --   74   GFRESTBLACK  84   --   >90   GFR Calc     POTASSIUM  4.3   --   4.1   TSH   --    --   1.45      BP Readings from Last 3 Encounters:   02/20/18 122/82   02/15/18 118/72   11/07/17 128/70    Wt Readings from Last 3 Encounters:   02/20/18 (!) 334 lb 4.8 oz (151.6 kg)   02/15/18 (!) 333 lb 11.2 oz (151.4 kg)   10/02/17 (!) 331 lb 14.4 oz (150.5 kg)                    Reviewed and updated as needed this visit by clinical staff  Tobacco  Allergies  Meds  Med Hx  Surg Hx  Fam Hx  Soc Hx      Reviewed and updated as needed this visit by Provider         ROS:  Constitutional, HEENT, cardiovascular, pulmonary, GI, , musculoskeletal, neuro, skin, endocrine and psych systems are negative, except as otherwise noted.    OBJECTIVE:     /82 (Cuff Size: Adult Large)  Pulse 73  Temp 98.1  F (36.7  C) (Oral)  Ht 5' 7.25\" (1.708 m)  Wt (!) 334 lb 4.8 oz (151.6 kg)  LMP 07/09/2007  SpO2 94%  BMI 51.97 kg/m2  Body mass index is 51.97 kg/(m^2).  GENERAL: healthy, alert and no distress  NECK: no adenopathy, no asymmetry, masses, or scars and thyroid normal to palpation  RESP: good air entry, few faint expiratory wheezes throughout.  CV: regular rate and rhythm, normal S1 S2, no S3 or S4, no murmur, " click or rub, no peripheral edema and peripheral pulses strong      Diagnostic Test Results:  Results for orders placed or performed in visit on 02/15/18   Influenza A/B antigen   Result Value Ref Range    Influenza A/B Agn Specimen Nasopharyngeal     Influenza A Negative NEG^Negative    Influenza B Negative NEG^Negative       ASSESSMENT/PLAN:     1. Essential hypertension  Blood pressure slightly above ideal. Plan increase dose hctz to 25 mg. Recheck lab in 1 month  - hydrochlorothiazide (HYDRODIURIL) 25 MG tablet; Take 1 tablet (25 mg) by mouth daily  Dispense: 90 tablet; Refill: 1  - rosuvastatin (CRESTOR) 5 MG tablet; Take 1 tablet (5 mg) by mouth daily  Dispense: 90 tablet; Refill: 1  - **Comprehensive metabolic panel FUTURE anytime; Future  - Lipid panel reflex to direct LDL Fasting; Future  - **Basic metabolic panel FUTURE anytime; Future    2. Hyperlipidemia LDL goal <130  Discussed options. She would like to do trial on low dose crestor. 5 mg daily. Recheck labs in 3 months  - hydrochlorothiazide (HYDRODIURIL) 25 MG tablet; Take 1 tablet (25 mg) by mouth daily  Dispense: 90 tablet; Refill: 1  - rosuvastatin (CRESTOR) 5 MG tablet; Take 1 tablet (5 mg) by mouth daily  Dispense: 90 tablet; Refill: 1  - **Comprehensive metabolic panel FUTURE anytime; Future  - Lipid panel reflex to direct LDL Fasting; Future    3. Ankylosing spondylitis, unspecified site of spine (H)  Taking 8 ibuprofen every day for this. Discussed decreasing as much as possible. Will check inflammatory markers. Have suggested she see rheum to see if there are good reasons to do a different medication. Discussed potential risks of ongoing inflammation. She does have recurrent iritis every 2 years or so.  - **ESR FUTURE anytime; Future  - CRP inflammation; Future    4. Influenza-like illness  Has had mild course, possibly due to early Tamiflu and flu vaccination. Still with wheezing. Discussed option to add inhaled steroid, but would also have  potential antiinflammatory affect with zithromax. Plan course now. If she is not better, she will let me know  - azithromycin (ZITHROMAX) 250 MG tablet; Two tablets first day, then one tablet daily for four days.  Dispense: 6 tablet; Refill: 0    5. Wheezing  As above  - azithromycin (ZITHROMAX) 250 MG tablet; Two tablets first day, then one tablet daily for four days.  Dispense: 6 tablet; Refill: 0    6. Morbid obesity (H)  Working on life balance.      Patient Instructions   Let's give it 1 week. If you stop improving, or are still needing albuterol twice a day, take the zithromax I sent to your pharmacy.  1 month nonfasting lab for potassium and kidney.  3 months fasting lab for cholestserol and liver.    Continue to work on balance in your lifestyle, making sure you are not working so many hours that you don't have time for yourself.    Set up an appointment with our rheumatologist, Dr. Cotton. This is to take a look at how much ongoing inflammation or joint destruction may be going on and discussing what to do to protect you later on.     I would like to see you again in 3 months after your bloodwork.       Ayla Cardona MD  [facility]

## 2018-02-26 PROBLEM — E78.5 HYPERLIPIDEMIA LDL GOAL <130: Status: ACTIVE | Noted: 2018-02-26

## 2018-03-20 DIAGNOSIS — E78.5 HYPERLIPIDEMIA LDL GOAL <130: ICD-10-CM

## 2018-03-20 DIAGNOSIS — M45.9 ANKYLOSING SPONDYLITIS, UNSPECIFIED SITE OF SPINE (H): ICD-10-CM

## 2018-03-20 DIAGNOSIS — I10 ESSENTIAL HYPERTENSION: ICD-10-CM

## 2018-03-20 LAB
ALBUMIN SERPL-MCNC: 3.3 G/DL (ref 3.4–5)
ALP SERPL-CCNC: 86 U/L (ref 40–150)
ALT SERPL W P-5'-P-CCNC: 24 U/L (ref 0–50)
ANION GAP SERPL CALCULATED.3IONS-SCNC: 5 MMOL/L (ref 3–14)
AST SERPL W P-5'-P-CCNC: 16 U/L (ref 0–45)
BILIRUB SERPL-MCNC: 0.4 MG/DL (ref 0.2–1.3)
BUN SERPL-MCNC: 15 MG/DL (ref 7–30)
CALCIUM SERPL-MCNC: 9.1 MG/DL (ref 8.5–10.1)
CHLORIDE SERPL-SCNC: 104 MMOL/L (ref 94–109)
CHOLEST SERPL-MCNC: 132 MG/DL
CO2 SERPL-SCNC: 31 MMOL/L (ref 20–32)
CREAT SERPL-MCNC: 0.8 MG/DL (ref 0.52–1.04)
CRP SERPL-MCNC: 8.7 MG/L (ref 0–8)
ERYTHROCYTE [SEDIMENTATION RATE] IN BLOOD BY WESTERGREN METHOD: 24 MM/H (ref 0–30)
GFR SERPL CREATININE-BSD FRML MDRD: 74 ML/MIN/1.7M2
GLUCOSE SERPL-MCNC: 106 MG/DL (ref 70–99)
HDLC SERPL-MCNC: 37 MG/DL
LDLC SERPL CALC-MCNC: 70 MG/DL
NONHDLC SERPL-MCNC: 95 MG/DL
POTASSIUM SERPL-SCNC: 3.6 MMOL/L (ref 3.4–5.3)
PROT SERPL-MCNC: 6.9 G/DL (ref 6.8–8.8)
SODIUM SERPL-SCNC: 140 MMOL/L (ref 133–144)
TRIGL SERPL-MCNC: 124 MG/DL

## 2018-03-20 PROCEDURE — 80053 COMPREHEN METABOLIC PANEL: CPT | Performed by: INTERNAL MEDICINE

## 2018-03-20 PROCEDURE — 86140 C-REACTIVE PROTEIN: CPT | Performed by: INTERNAL MEDICINE

## 2018-03-20 PROCEDURE — 36415 COLL VENOUS BLD VENIPUNCTURE: CPT | Performed by: INTERNAL MEDICINE

## 2018-03-20 PROCEDURE — 80061 LIPID PANEL: CPT | Performed by: INTERNAL MEDICINE

## 2018-03-20 PROCEDURE — 85652 RBC SED RATE AUTOMATED: CPT | Performed by: INTERNAL MEDICINE

## 2018-04-16 NOTE — PROGRESS NOTES
[facility] - Rheumatology Clinic Visit     Katharina Mejias MRN# [id number]   YOB: 1960    Primary care provider: Ayla Graf  Apr 20, 2018          Assessment and Plan:   # HLA B27 positive (Dx 2006)  # Recurrent iritis   # Current tobacco use  # Bilateral hips- on and off  # Midline low back pain X on and off  # Elevated CRP  # Morbid obesity  # Lymphadema bilateral    Sed rate within normal limits   TSH within normal limits  Basic blood cell counts, liver and kidney function labs within normal limits    Last iritis episode 2 years ago. But patient reports that it takes several months to bring iritis under control if it flares. Patient looking for a medication to prevent iritis flare ups.     Patient carries a diagnosis of ankylosing spondylitis but no signs of active inflammation with daily morning pain and stiffness at this time. Also ROM is reasonable and good. We will get xray of SI joints to assess.     Elevated CRP is likely from morbid obesity. Sed rate is normal.     The labs from patient records are reviewed.     I will be back in touch with the patient through mychart/letter when results are available.     Patient agrees with the above mentioned treatment plan.     Most Recent Immunizations   Administered Date(s) Administered     Influenza Vaccine IM 3yrs+ 4 Valent IIV4 10/02/2017     Mantoux Tuberculin Skin Test 05/22/2006     TDAP Vaccine (Adacel) 10/02/2017       Orders Placed This Encounter   Procedures     XR Sacroiliac Joint 1/2 Views       Return in about 6 weeks (around 6/1/2018).    Medications Discontinued During This Encounter   Medication Reason     azithromycin (ZITHROMAX) 250 MG tablet      hydrochlorothiazide 12.5 MG TABS tablet      Current Outpatient Prescriptions   Medication Sig Dispense Refill     albuterol (PROAIR HFA/PROVENTIL HFA/VENTOLIN HFA) 108 (90 BASE) MCG/ACT Inhaler Inhale 2 puffs into the lungs every 4 hours as needed for shortness of breath / dyspnea  or wheezing 1 Inhaler 1     hydrochlorothiazide (HYDRODIURIL) 25 MG tablet Take 1 tablet (25 mg) by mouth daily 90 tablet 1     IBUPROFEN 200 MG OR TABS 600 mg tid 120 0     multivitamin, therapeutic with minerals (MULTI-VITAMIN) TABS tablet Take 1 tablet by mouth daily 100 tablet 3     ranitidine (ZANTAC) 150 MG tablet Take 150 mg by mouth as needed for heartburn       rosuvastatin (CRESTOR) 5 MG tablet Take 1 tablet (5 mg) by mouth daily 90 tablet 1     [DISCONTINUED] hydrochlorothiazide 12.5 MG TABS tablet Take 1 tablet (12.5 mg) by mouth daily Due for an appointment for further refills. Please call to schedule. 30 tablet 0       Alberto Cotton MD  [facility]          Active Problem List:     Patient Active Problem List    Diagnosis Date Noted     Hyperlipidemia LDL goal <130 02/26/2018     Priority: Medium     Morbid obesity (H) 12/28/2017     Priority: Medium     Essential hypertension 10/02/2017     Priority: Medium     ACP (advance care planning) 06/27/2017     Priority: Medium     Advance Care Planning 6/27/2017: ACP Review of Chart / Resources Provided:  Reviewed chart for advance care plan.  Katharina SHERLEY Mejias has no plan or code status on file. Code status updated and sent to provider for review. Advance Care Planning letter sent. Added by Ludmila Lagos             CARDIOVASCULAR SCREENING; LDL GOAL LESS THAN 160 02/10/2010     Priority: Medium     Ankylosing spondylitis (H)      Priority: Medium            History of Present Illness:     Chief Complaint   Patient presents with     Establish Care       April 16, 2018  Have you ever seen a rheumatologist Yes Who in [place] When at least 10 years  Joint pain history  Onset: pt states that she is here due to has hx of ankylosing spondylitis, (HLA B27 positive) has been managing with ibuprofen. Has had iritis 2 years ago. Has some back pain that comes and  Goes    Involved joints: see above   Pain scale:  5.5/10  ; no chronic pain; if  standing or walking a long distance or sitting for a great length of time, then hip pain  Wakes the patient from sleep : Yes/sometimes  Morning stiffness:Yes for 1 minutes  Meds used:ibuprofen not daily     Interim history  Since last visit:  1. Infections - Yes/ influenza in Feb. 2018  2. New symptoms/medical problem - No  3. Any side effects from Rheum medications -NA  3. ER visits/Hospitalizations/surgeries - No  4. Last PCP visit: 2/20/18    shortness of breath, cough attributed to smoking  Dry eyes attributed to computer use    Wt Readings from Last 4 Encounters:   04/20/18 (!) 150.7 kg (332 lb 3.2 oz)   02/20/18 (!) 151.6 kg (334 lb 4.8 oz)   02/15/18 (!) 151.4 kg (333 lb 11.2 oz)   10/02/17 (!) 150.5 kg (331 lb 14.4 oz)     No h/o unintentional weight loss, fevers, rash, swollen glands  No family or personal history of psoriasis, ulcerative colitis or chron's disease.   Patient denies any raynauds  No h/o persistent , chest pain  No h/o persistent vomiting, diarrhea, abdominal pain  No h/o hematochezia, hematuria, hemoptysis  No h/o seizures     BP Readings from Last 3 Encounters:   04/20/18 124/70   02/20/18 122/82   02/15/18 118/72          Review of Systems:   Complete ROS negative except for symptoms mentioned in the HPI          Past Medical History:     Past Medical History:   Diagnosis Date     Acute and subacute iridocyclitis, unspecified 8/05, 4/06     Ankylosing spondylitis (H)      Lumbago      Other and unspecified ovarian cyst 1985     Other cataract      Tobacco use disorder      Past Surgical History:   Procedure Laterality Date     NO HISTORY OF SURGERY              Social History:     Social History     Occupational History     / Other     Social History Main Topics     Smoking status: Current Every Day Smoker     Smokeless tobacco: Never Used     Alcohol use Yes      Comment: rarely     Drug use: No     Sexual activity: No            Family History:     Family History  "  Problem Relation Age of Onset     HEART DISEASE Mother      d. age 70 CHF, thought due to very low K     Blood Disease Mother       \"borderline hemophilia\", diagnosed after bleeding ulcer, treated with vit K, then had blood clot     Prostate Cancer Father      HEART DISEASE Maternal Uncle      DIABETES Daughter             Allergies:     Allergies   Allergen Reactions     Mold      Vomiting and diarrhea      Penicillins             Medications:     Current Outpatient Prescriptions   Medication Sig Dispense Refill     albuterol (PROAIR HFA/PROVENTIL HFA/VENTOLIN HFA) 108 (90 BASE) MCG/ACT Inhaler Inhale 2 puffs into the lungs every 4 hours as needed for shortness of breath / dyspnea or wheezing 1 Inhaler 1     hydrochlorothiazide (HYDRODIURIL) 25 MG tablet Take 1 tablet (25 mg) by mouth daily 90 tablet 1     IBUPROFEN 200 MG OR TABS 600 mg tid 120 0     multivitamin, therapeutic with minerals (MULTI-VITAMIN) TABS tablet Take 1 tablet by mouth daily 100 tablet 3     ranitidine (ZANTAC) 150 MG tablet Take 150 mg by mouth as needed for heartburn       rosuvastatin (CRESTOR) 5 MG tablet Take 1 tablet (5 mg) by mouth daily 90 tablet 1     [DISCONTINUED] hydrochlorothiazide 12.5 MG TABS tablet Take 1 tablet (12.5 mg) by mouth daily Due for an appointment for further refills. Please call to schedule. 30 tablet 0            Physical Exam:   Blood pressure 124/70, pulse 73, temperature 97.8  F (36.6  C), temperature source Oral, height 1.702 m (5' 7\"), weight (!) 150.7 kg (332 lb 3.2 oz), last menstrual period 07/09/2007, SpO2 94 %.  Wt Readings from Last 4 Encounters:   04/20/18 (!) 150.7 kg (332 lb 3.2 oz)   02/20/18 (!) 151.6 kg (334 lb 4.8 oz)   02/15/18 (!) 151.4 kg (333 lb 11.2 oz)   10/02/17 (!) 150.5 kg (331 lb 14.4 oz)       Constitutional: morbid obesity, appearing stated age; cooperative  Eyes: normal conjunctiva, sclera  ENT: nl external ears, nose, lips.No mucous membrane lesions, normal saliva pool  Neck: no " cervical lymphadenopathy  Resp: lungs clear to auscultation in the bases,   CV: RRR, no added sounds  GI: Abdomen soft and no tenderness  : not tested  Lymph: no cervical, supraclavicular or epitrochlear nodes; bilateral leg edema +++ present    MS: Spine: cervical and lumbar ROM is good.   All shoulder, elbow, wrist, MCP/PIP/DIP, hip, knee, ankle, and foot MTP/IP joints were examined and  found without active synovitis or major deformity. Full ROM.  No dactylitis,  tenosynovitis, enthesopathy.  Skin: no rash in exposed areas  Psych: nl judgement, orientation, memory, affect.         Data:         Alberto Cotton MD    [facility]

## 2018-04-20 ENCOUNTER — RADIANT APPOINTMENT (OUTPATIENT)
Dept: GENERAL RADIOLOGY | Facility: CLINIC | Age: 58
End: 2018-04-20
Attending: INTERNAL MEDICINE
Payer: COMMERCIAL

## 2018-04-20 ENCOUNTER — OFFICE VISIT (OUTPATIENT)
Dept: RHEUMATOLOGY | Facility: CLINIC | Age: 58
End: 2018-04-20
Payer: COMMERCIAL

## 2018-04-20 VITALS
HEIGHT: 67 IN | WEIGHT: 293 LBS | BODY MASS INDEX: 45.99 KG/M2 | DIASTOLIC BLOOD PRESSURE: 70 MMHG | OXYGEN SATURATION: 94 % | TEMPERATURE: 97.8 F | SYSTOLIC BLOOD PRESSURE: 124 MMHG | HEART RATE: 73 BPM

## 2018-04-20 DIAGNOSIS — Z87.39 HISTORY OF ANKYLOSING SPONDYLITIS: Primary | ICD-10-CM

## 2018-04-20 DIAGNOSIS — Z87.39 HISTORY OF ANKYLOSING SPONDYLITIS: ICD-10-CM

## 2018-04-20 PROCEDURE — 72200 X-RAY EXAM SI JOINTS: CPT

## 2018-04-20 PROCEDURE — 99204 OFFICE O/P NEW MOD 45 MIN: CPT | Performed by: INTERNAL MEDICINE

## 2018-04-20 RX ORDER — MULTIPLE VITAMINS W/ MINERALS TAB 9MG-400MCG
1 TAB ORAL DAILY
Qty: 100 TABLET | Refills: 3 | COMMUNITY
Start: 2018-04-20

## 2018-04-20 ASSESSMENT — ROUTINE ASSESSMENT OF PATIENT INDEX DATA (RAPID3)
RAPID3 INTERPRETATION: MODERATE 6.1-12.0
TOTAL RAPID3 SCORE: 10.5

## 2018-04-20 NOTE — MR AVS SNAPSHOT
After Visit Summary   4/20/2018    Katharina Mejias    MRN: [id number]           Patient Information     Date Of Birth          1960        Visit Information        Provider Department      4/20/2018 8:30 AM [facility]        Today's Diagnoses     History of ankylosing spondylitis    -  1       Follow-ups after your visit        Follow-up notes from your care team     Return in about 6 weeks (around 6/1/2018).      Your next 10 appointments already scheduled     May 14, 2018  7:30 AM CDT   LAB with [facility]   [facility] [place] ([facility]    [address]  [address]  [place] [address] [phone]           Please do not eat 10-12 hours before your appointment if you are coming in fasting for labs on lipids, cholesterol, or glucose (sugar). This does not apply to pregnant women. Water, hot tea and black coffee (with nothing added) are okay. Do not drink other fluids, diet soda or chew gum.            May 21, 2018  7:30 AM CDT   SHORT with Ayla Graf MD   [facility] [place] ([facility]    [address]  [address]  [place] [address] [phone]              Future tests that were ordered for you today     Open Future Orders        Priority Expected Expires Ordered    XR Sacroiliac Joint 1/2 Views Routine 4/20/2018 4/20/2019 4/20/2018            Who to contact     If you have questions or need follow up information about today's clinic visit or your schedule please contact [facility] directly at [phone].  Normal or non-critical lab and imaging results will be communicated to you by MyChart, letter or phone within 4 business days after the clinic has received the results. If you do not hear from us within 7 days, please contact the clinic through MyChart or phone. If you have a critical or abnormal lab result, we will notify you by phone as soon as  "possible.  Submit refill requests through [employer] or call your pharmacy and they will forward the refill request to us. Please allow 3 business days for your refill to be completed.          Additional Information About Your Visit        [employer] Information     [employer] lets you send messages to your doctor, view your test results, renew your prescriptions, schedule appointments and more. To sign up, go to [email or web address] . Click on \"Log in\" on the left side of the screen, which will take you to the Welcome page. Then click on \"Sign up Now\" on the right side of the page.     You will be asked to enter the access code listed below, as well as some personal information. Please follow the directions to create your username and password.     Your access code is: [email or web address]  Expires: 2018  9:52 AM     Your access code will  in 90 days. If you need help or a new code, please call your [facility] clinic or [phone].        Care EveryWhere ID     This is your Care EveryWhere ID. This could be used by other organizations to access your [facility] medical records  [id number]        Your Vitals Were     Pulse Temperature Height Last Period Pulse Oximetry BMI (Body Mass Index)    73 97.8  F (36.6  C) (Oral) 1.702 m (5' 7\") 2007 94% 52.03 kg/m2       Blood Pressure from Last 3 Encounters:   18 124/70   18 122/82   02/15/18 118/72    Weight from Last 3 Encounters:   18 (!) 150.7 kg (332 lb 3.2 oz)   18 (!) 151.6 kg (334 lb 4.8 oz)   02/15/18 (!) 151.4 kg (333 lb 11.2 oz)                 Today's Medication Changes          These changes are accurate as of 18  9:24 AM.  If you have any questions, ask your nurse or doctor.               These medicines have changed or have updated prescriptions.        Dose/Directions    hydrochlorothiazide 25 MG tablet   Commonly known as:  HYDRODIURIL   This may have changed:  Another medication with the same name was removed. Continue " taking this medication, and follow the directions you see here.   Used for:  Essential hypertension, Hyperlipidemia LDL goal <130   Changed by:  Alberto Cotton MD        Dose:  25 mg   Take 1 tablet (25 mg) by mouth daily   Quantity:  90 tablet   Refills:  1         Stop taking these medicines if you haven't already. Please contact your care team if you have questions.     azithromycin 250 MG tablet   Commonly known as:  ZITHROMAX   Stopped by:  Alberto Cotton MD                    Primary Care Provider Office Phone # Fax #    Ayla Graf [phone] [address] [place] [address]        Equal Access to Services     [facility]: Hadii dulce bustos hadasho Sotingali, waaxda luqadaha, qaybta kaalmada adeegyada, tutu martin . So [facility] [phone].    ATENCIÓN: Si habla español, tiene a quintana disposición servicios gratuitos de asistencia lingüística. Llame al [phone].    We comply with applicable federal civil rights laws and [place] laws. We do not discriminate on the basis of race, color, national origin, age, disability, sex, sexual orientation, or gender identity.            Thank you!     Thank you for choosing [facility]  for your care. Our goal is always to provide you with excellent care. Hearing back from our patients is one way we can continue to improve our services. Please take a few minutes to complete the written survey that you may receive in the mail after your visit with us. Thank you!             Your Updated Medication List - Protect others around you: Learn how to safely use, store and throw away your medicines at www.disposemymeds.org.          This list is accurate as of 4/20/18  9:24 AM.  Always use your most recent med list.                   Brand Name Dispense Instructions for use Diagnosis    albuterol 108 (90 Base) MCG/ACT Inhaler    PROAIR HFA/PROVENTIL HFA/VENTOLIN HFA    1 Inhaler     Inhale 2 puffs into the lungs every 4 hours as needed for shortness of breath / dyspnea or wheezing    Rhonchi, Acute bronchitis with symptoms > 10 days       hydrochlorothiazide 25 MG tablet    HYDRODIURIL    90 tablet    Take 1 tablet (25 mg) by mouth daily    Essential hypertension, Hyperlipidemia LDL goal <130       ibuprofen 200 MG tablet    ADVIL/MOTRIN    120    600 mg tid        Multi-vitamin Tabs tablet     100 tablet    Take 1 tablet by mouth daily        ranitidine 150 MG tablet    ZANTAC     Take 150 mg by mouth as needed for heartburn        rosuvastatin 5 MG tablet    CRESTOR    90 tablet    Take 1 tablet (5 mg) by mouth daily    Hyperlipidemia LDL goal <130, Essential hypertension

## 2018-04-23 NOTE — PROGRESS NOTES
Please call patient with these results.Xray may be false-negative. Patient has h/o ankylosing spondylitis. If patient agrees, I recommend that we proceed with MRI of the sacroiliac joints to make sure.

## 2018-04-24 ENCOUNTER — TELEPHONE (OUTPATIENT)
Dept: RHEUMATOLOGY | Facility: CLINIC | Age: 58
End: 2018-04-24

## 2018-04-24 NOTE — TELEPHONE ENCOUNTER
Please call patient with these results.Xray may be false-negative. Patient has h/o ankylosing spondylitis. If patient agrees, I recommend that we proceed with MRI of the sacroiliac joints to make sure.                  I spoke to pt, she would prefer not doing the MRI, due to she would have to pay for all of it. She has a high deductible. Day Parr LPN

## 2018-05-14 DIAGNOSIS — I10 ESSENTIAL HYPERTENSION: ICD-10-CM

## 2018-05-14 LAB
ANION GAP SERPL CALCULATED.3IONS-SCNC: 6 MMOL/L (ref 3–14)
BUN SERPL-MCNC: 16 MG/DL (ref 7–30)
CALCIUM SERPL-MCNC: 9.1 MG/DL (ref 8.5–10.1)
CHLORIDE SERPL-SCNC: 108 MMOL/L (ref 94–109)
CO2 SERPL-SCNC: 27 MMOL/L (ref 20–32)
CREAT SERPL-MCNC: 0.83 MG/DL (ref 0.52–1.04)
GFR SERPL CREATININE-BSD FRML MDRD: 71 ML/MIN/1.7M2
GLUCOSE SERPL-MCNC: 120 MG/DL (ref 70–99)
POTASSIUM SERPL-SCNC: 3.7 MMOL/L (ref 3.4–5.3)
SODIUM SERPL-SCNC: 141 MMOL/L (ref 133–144)

## 2018-05-14 PROCEDURE — 80048 BASIC METABOLIC PNL TOTAL CA: CPT | Performed by: INTERNAL MEDICINE

## 2018-05-14 PROCEDURE — 36415 COLL VENOUS BLD VENIPUNCTURE: CPT | Performed by: INTERNAL MEDICINE

## 2018-05-21 ENCOUNTER — OFFICE VISIT (OUTPATIENT)
Dept: PEDIATRICS | Facility: CLINIC | Age: 58
End: 2018-05-21
Payer: COMMERCIAL

## 2018-05-21 VITALS
BODY MASS INDEX: 45.99 KG/M2 | SYSTOLIC BLOOD PRESSURE: 118 MMHG | DIASTOLIC BLOOD PRESSURE: 70 MMHG | HEIGHT: 67 IN | HEART RATE: 80 BPM | TEMPERATURE: 98 F | WEIGHT: 293 LBS

## 2018-05-21 DIAGNOSIS — F17.200 TOBACCO USE DISORDER: ICD-10-CM

## 2018-05-21 DIAGNOSIS — I10 ESSENTIAL HYPERTENSION: Primary | ICD-10-CM

## 2018-05-21 DIAGNOSIS — E66.01 MORBID OBESITY (H): ICD-10-CM

## 2018-05-21 DIAGNOSIS — E78.5 HYPERLIPIDEMIA LDL GOAL <130: ICD-10-CM

## 2018-05-21 DIAGNOSIS — M45.9 ANKYLOSING SPONDYLITIS, UNSPECIFIED SITE OF SPINE (H): ICD-10-CM

## 2018-05-21 LAB
ALT SERPL W P-5'-P-CCNC: 27 U/L (ref 0–50)
CHOLEST SERPL-MCNC: 133 MG/DL
HDLC SERPL-MCNC: 37 MG/DL
LDLC SERPL CALC-MCNC: 67 MG/DL
NONHDLC SERPL-MCNC: 96 MG/DL
TRIGL SERPL-MCNC: 143 MG/DL

## 2018-05-21 PROCEDURE — 99214 OFFICE O/P EST MOD 30 MIN: CPT | Performed by: INTERNAL MEDICINE

## 2018-05-21 PROCEDURE — 84460 ALANINE AMINO (ALT) (SGPT): CPT | Performed by: INTERNAL MEDICINE

## 2018-05-21 PROCEDURE — 80061 LIPID PANEL: CPT | Performed by: INTERNAL MEDICINE

## 2018-05-21 PROCEDURE — 36415 COLL VENOUS BLD VENIPUNCTURE: CPT | Performed by: INTERNAL MEDICINE

## 2018-05-21 RX ORDER — HYDROCHLOROTHIAZIDE 25 MG/1
25 TABLET ORAL DAILY
Qty: 90 TABLET | Refills: 1 | Status: SHIPPED | OUTPATIENT
Start: 2018-05-21 | End: 2018-11-05

## 2018-05-21 RX ORDER — ROSUVASTATIN CALCIUM 5 MG/1
5 TABLET, COATED ORAL DAILY
Qty: 90 TABLET | Refills: 1 | Status: SHIPPED | OUTPATIENT
Start: 2018-05-21 | End: 2018-11-05

## 2018-05-21 RX ORDER — IBUPROFEN 200 MG
400 TABLET ORAL EVERY 12 HOURS PRN
Status: ON HOLD | COMMUNITY
End: 2023-01-01

## 2018-05-21 NOTE — MR AVS SNAPSHOT
After Visit Summary   5/21/2018    Katharina Mejias    MRN: [id number]           Patient Information     Date Of Birth          1960        Visit Information        Provider Department      5/21/2018 7:30 AM Ayla Graf MD [facility]        Today's Diagnoses     Essential hypertension        Hyperlipidemia LDL goal <130          Care Instructions    Keep your medications the same.  Continue to work on balance with time for yourself for walking.            Follow-ups after your visit        Follow-up notes from your care team     Return in about 3 months (around 8/21/2018).      Your next 10 appointments already scheduled     Jun 04, 2018  3:00 PM CDT   Return Visit with Alberto Cotton MD   [facility] ([facility]    [address] [place] [address] [phone]            Aug 21, 2018  8:10 AM CDT   SHORT with Ayla Graf MD   [facility] ([facility])    [address] [place] [address]  [address]  [facility] [address] [phone]              Who to contact     If you have questions or need follow up information about today's clinic visit or your schedule please contact [facility] directly at [phone].  Normal or non-critical lab and imaging results will be communicated to you by P[employer], letter or phone within 4 business days after the clinic has received the results. If you do not hear from us within 7 days, please contact the clinic through Pipefish[employer] or phone. If you have a critical or abnormal lab result, we will notify you by phone as soon as possible.  Submit refill requests through [employer] or call your pharmacy and they will forward the refill request to us. Please allow 3 business days for your refill to be completed.          Additional Information About Your Visit        [email or web address] Information     [employer] lets you send messages to your doctor, view your test results,  "renew your prescriptions, schedule appointments and more. To sign up, go to [email or web address] . Click on \"Log in\" on the left side of the screen, which will take you to the Welcome page. Then click on \"Sign up Now\" on the right side of the page.     You will be asked to enter the access code listed below, as well as some personal information. Please follow the directions to create your username and password.     Your access code is: [email or web address]  Expires: 2018  9:52 AM     Your access code will  in 90 days. If you need help or a new code, please call your [place] clinic or [phone].        Care EveryWhere ID     This is your Care EveryWhere ID. This could be used by other organizations to access your [place] medical records  [id number]        Your Vitals Were     Pulse Temperature Height Last Period BMI (Body Mass Index)       80 98  F (36.7  C) (Oral) 5' 7\" (1.702 m) 2007 52.09 kg/m2        Blood Pressure from Last 3 Encounters:   18 118/70   18 124/70   18 122/82    Weight from Last 3 Encounters:   18 (!) 332 lb 9.6 oz (150.9 kg)   18 (!) 332 lb 3.2 oz (150.7 kg)   18 (!) 334 lb 4.8 oz (151.6 kg)              We Performed the Following     ALT     Lipid panel reflex to direct LDL Fasting          Today's Medication Changes          These changes are accurate as of 18  8:43 AM.  If you have any questions, ask your nurse or doctor.               These medicines have changed or have updated prescriptions.        Dose/Directions    ibuprofen 200 MG tablet   Commonly known as:  ADVIL/MOTRIN   This may have changed:  Another medication with the same name was removed. Continue taking this medication, and follow the directions you see here.   Changed by:  Ayla Graf MD        Dose:  400 mg   Take 400 mg by mouth every 12 hours as needed for mild pain   Refills:  0            Where to get your medicines      These medications were sent to " [id number] - [facility] [address] AT [facility] [address], [facility] [address]     Phone:  [phone]     hydrochlorothiazide 25 MG tablet    rosuvastatin 5 MG tablet                Primary Care Provider Office Phone # Fax #    Ayla Graf [phone] [address] [place] [address]        Equal Access to Services     BREANNA DAVIDSON : Hadii aad ku hadasho Soomaali, waaxda luqadaha, qaybta kaalmada adeegyada, waxay idiin hayaan adeeg kharash la'moraima winston. So [facility] [phone].    ATENCIÓN: Si habla español, tiene a quintana disposición servicios gratuitos de asistencia lingüística. [facility] [phone].    We comply with applicable federal civil rights laws and [place] laws. We do not discriminate on the basis of race, color, national origin, age, disability, sex, sexual orientation, or gender identity.            Thank you!     Thank you for choosing [facility]  for your care. Our goal is always to provide you with excellent care. Hearing back from our patients is one way we can continue to improve our services. Please take a few minutes to complete the written survey that you may receive in the mail after your visit with us. Thank you!             Your Updated Medication List - Protect others around you: Learn how to safely use, store and throw away your medicines at www.disposemymeds.org.          This list is accurate as of 5/21/18  8:43 AM.  Always use your most recent med list.                   Brand Name Dispense Instructions for use Diagnosis    albuterol 108 (90 Base) MCG/ACT Inhaler    PROAIR HFA/PROVENTIL HFA/VENTOLIN HFA    1 Inhaler    Inhale 2 puffs into the lungs every 4 hours as needed for shortness of breath / dyspnea or wheezing    Rhonchi, Acute bronchitis with symptoms > 10 days       hydrochlorothiazide 25 MG tablet    HYDRODIURIL    90 tablet    Take 1 tablet (25 mg) by mouth daily     Essential hypertension, Hyperlipidemia LDL goal <130       ibuprofen 200 MG tablet    ADVIL/MOTRIN     Take 400 mg by mouth every 12 hours as needed for mild pain        Multi-vitamin Tabs tablet     100 tablet    Take 1 tablet by mouth daily        ranitidine 150 MG tablet    ZANTAC     Take 150 mg by mouth as needed for heartburn        rosuvastatin 5 MG tablet    CRESTOR    90 tablet    Take 1 tablet (5 mg) by mouth daily    Hyperlipidemia LDL goal <130, Essential hypertension

## 2018-05-21 NOTE — PATIENT INSTRUCTIONS
Keep your medications the same.  Continue to work on balance with time for yourself for walking.

## 2018-05-21 NOTE — LETTER
[address] [address]                  [phone]   June 4, 2018    Katharina Mejias  [address]  [facility] [address]      Katharina,     Your blood tests look perfect.  I'm so glad you're tolerating the cholesterol medication. We can see your cholesterol continues to look great.     It was a pleasure seeing you at your last visit.  Please let me know if you have questions or concerns.     Best Regards,   Ayla Graf M.D.         Results for orders placed or performed in visit on 05/21/18   ALT   Result Value Ref Range    ALT 27 0 - 50 U/L   Lipid panel reflex to direct LDL Fasting   Result Value Ref Range    Cholesterol 133 <200 mg/dL    Triglycerides 143 <150 mg/dL    HDL Cholesterol 37 (L) >49 mg/dL    LDL Cholesterol Calculated 67 <100 mg/dL    Non HDL Cholesterol 96 <130 mg/dL

## 2018-05-21 NOTE — PROGRESS NOTES
"  SUBJECTIVE:   Katharina Mejias is a 58 year old female who presents to clinic today for the following health issues:      Hypertension Follow-up      Outpatient blood pressures are not being checked.    Low Salt Diet: no added salt      Amount of exercise or physical activity: 1 day/week for an average of 20 minutes    Problems taking medications regularly: No    Medication side effects: none    Diet: regular (no restrictions)      Medication Followup of HCTZ    Taking Medication as prescribed: yes    Side Effects:  None    Medication Helping Symptoms:  yes       Hyperlipidemia Follow-Up      Rate your low fat/cholesterol diet?: good    Taking statin?  Yes, no muscle aches from statin    Other lipid medications/supplements?:  none    Discussed smoking cessation today. States she is not ready to quit. Is very worried she will gain 50 pounds, which happened to her the last time she quit smoking. Does report her daughter started a weight loss medication and is doing well. She is wondering about the option to do this, too.    Problem list and histories reviewed & adjusted, as indicated.  Additional history: as documented    Patient Active Problem List   Diagnosis     Ankylosing spondylitis (H)     CARDIOVASCULAR SCREENING; LDL GOAL LESS THAN 160     ACP (advance care planning)     Essential hypertension     Morbid obesity (H)     Hyperlipidemia LDL goal <130     Tobacco use disorder     Past Surgical History:   Procedure Laterality Date     NO HISTORY OF SURGERY         Social History   Substance Use Topics     Smoking status: Current Every Day Smoker     Smokeless tobacco: Never Used     Alcohol use Yes      Comment: rarely     Family History   Problem Relation Age of Onset     HEART DISEASE Mother      d. age 70 CHF, thought due to very low K     Blood Disease Mother       \"borderline hemophilia\", diagnosed after bleeding ulcer, treated with vit K, then had blood clot     Prostate Cancer Father      HEART DISEASE " "Maternal Uncle      DIABETES Daughter          Current Outpatient Prescriptions   Medication Sig Dispense Refill     albuterol (PROAIR HFA/PROVENTIL HFA/VENTOLIN HFA) 108 (90 BASE) MCG/ACT Inhaler Inhale 2 puffs into the lungs every 4 hours as needed for shortness of breath / dyspnea or wheezing 1 Inhaler 1     hydrochlorothiazide (HYDRODIURIL) 25 MG tablet Take 1 tablet (25 mg) by mouth daily 90 tablet 1     ibuprofen (ADVIL/MOTRIN) 200 MG tablet Take 400 mg by mouth every 12 hours as needed for mild pain       multivitamin, therapeutic with minerals (MULTI-VITAMIN) TABS tablet Take 1 tablet by mouth daily 100 tablet 3     ranitidine (ZANTAC) 150 MG tablet Take 150 mg by mouth as needed for heartburn       rosuvastatin (CRESTOR) 5 MG tablet Take 1 tablet (5 mg) by mouth daily 90 tablet 1     [DISCONTINUED] hydrochlorothiazide (HYDRODIURIL) 25 MG tablet Take 1 tablet (25 mg) by mouth daily 90 tablet 1     [DISCONTINUED] rosuvastatin (CRESTOR) 5 MG tablet Take 1 tablet (5 mg) by mouth daily 90 tablet 1       Reviewed and updated as needed this visit by clinical staff       Reviewed and updated as needed this visit by Provider         ROS:  Constitutional, HEENT, cardiovascular, pulmonary, GI, , musculoskeletal, neuro, skin, endocrine and psych systems are negative, except as otherwise noted.    OBJECTIVE:     /70 (Cuff Size: Adult Large)  Pulse 80  Temp 98  F (36.7  C) (Oral)  Ht 5' 7\" (1.702 m)  Wt (!) 332 lb 9.6 oz (150.9 kg)  LMP 07/09/2007  BMI 52.09 kg/m2  Body mass index is 52.09 kg/(m^2).  GENERAL: healthy, alert and no distress  NECK: no adenopathy, no asymmetry, masses, or scars and thyroid normal to palpation  RESP: lungs clear to auscultation - no rales, rhonchi or wheezes  CV: regular rate and rhythm, normal S1 S2, no S3 or S4, no murmur, click or rub, no peripheral edema and peripheral pulses strong  ABDOMEN: soft, nontender, no hepatosplenomegaly, no masses and bowel sounds normal  MS: no " gross musculoskeletal defects noted, no edema    Diagnostic Test Results:  Results for orders placed or performed in visit on 05/14/18   **Basic metabolic panel FUTURE anytime   Result Value Ref Range    Sodium 141 133 - 144 mmol/L    Potassium 3.7 3.4 - 5.3 mmol/L    Chloride 108 94 - 109 mmol/L    Carbon Dioxide 27 20 - 32 mmol/L    Anion Gap 6 3 - 14 mmol/L    Glucose 120 (H) 70 - 99 mg/dL    Urea Nitrogen 16 7 - 30 mg/dL    Creatinine 0.83 0.52 - 1.04 mg/dL    GFR Estimate 71 >60 mL/min/1.7m2    GFR Estimate If Black 85 >60 mL/min/1.7m2    Calcium 9.1 8.5 - 10.1 mg/dL       ASSESSMENT/PLAN:     1. Essential hypertension  Excellent control. Continue current regimen.  - hydrochlorothiazide (HYDRODIURIL) 25 MG tablet; Take 1 tablet (25 mg) by mouth daily  Dispense: 90 tablet; Refill: 1  - rosuvastatin (CRESTOR) 5 MG tablet; Take 1 tablet (5 mg) by mouth daily  Dispense: 90 tablet; Refill: 1  - ALT    2. Hyperlipidemia LDL goal <130  Doing well with statin. Unfortunately, had aLT and lipids drawn 4 weeks after starting statin instead of 12 weeks. Will recheck now or in 3 months.   - hydrochlorothiazide (HYDRODIURIL) 25 MG tablet; Take 1 tablet (25 mg) by mouth daily  Dispense: 90 tablet; Refill: 1  - rosuvastatin (CRESTOR) 5 MG tablet; Take 1 tablet (5 mg) by mouth daily  Dispense: 90 tablet; Refill: 1  - Lipid panel reflex to direct LDL Fasting    3. Ankylosing spondylitis, unspecified site of spine (H)  Reviewed visit with rheumatology. Has follow up in 2 weeks.    4. Morbid obesity (H)  Discussed briefly her concerns about weight gain and quitting smoking. She will set up appointment to discuss options for weight loss. Discussed that working on this may help her prevent weight gain with quitting smoking as well. She plans on going on a wonderful cruise in 2020 and we discussed being in a better place with health at that time.     5. Tobacco use disorder  See above. Discussed option to do chantix. She doesn't feel  ready at this time.      Patient Instructions   Keep your medications the same.  Continue to work on balance with time for yourself for walking.        Ayla Graf MD  [facility]

## 2018-06-01 NOTE — PROGRESS NOTES
[facility] - Rheumatology Clinic Visit     Katharina Mejias MRN# [id number]   YOB: 1960    Primary care provider: Ayla Graf  Jun 4, 2018          Assessment and Plan:   # HLA B27 positive (Dx 2006)  # Recurrent iritis   # Current tobacco use  # Chronic NSAID use   # Bilateral hips- on and off  # Midline low back pain X on and off  # Elevated CRP  # Morbid obesity  # Lymphadema bilateral    Sed rate within normal limits   TSH within normal limits  Basic blood cell counts, liver and kidney function labs within normal limits    Last iritis episode about 2016. But patient reports that it takes several months to bring iritis under control if it flares. Patient looking for a medication to prevent iritis flare ups. I mentioned that since the last episode was only in 2016, I would not recommend any chronic medication to prevent iritis. We discussed lifestyle modifications including dietary changes and smoking cessation and weight loss.      Patient carries a diagnosis of ankylosing spondylitis but no signs of active inflammation with daily morning pain and stiffness at this time. Also ROM is reasonable and good. Xray of SI joints was unremarkable. I cannot confirm the diagnosis of ankylosing spondylitis without MRI of SI joints. Patient has high deductible and reports that she cannot afford it at this time.     Elevated CRP is likely from morbid obesity. Sed rate is normal.     For chronic NSAID use, patient needs CBC, AST, ALT, Creatinine every 6 months. This may be done through PCP.     The labs from patient records are reviewed.     I will be back in touch with the patient through mychart/letter when results are available.     Patient agrees with the above mentioned treatment plan.     Most Recent Immunizations   Administered Date(s) Administered     Influenza Vaccine IM 3yrs+ 4 Valent IIV4 10/02/2017     Mantoux Tuberculin Skin Test 05/22/2006     TDAP Vaccine (Adacel) 10/02/2017       No orders  of the defined types were placed in this encounter.      Return in about 1 year (around 6/4/2019).    There are no discontinued medications.  Current Outpatient Prescriptions   Medication Sig Dispense Refill     albuterol (PROAIR HFA/PROVENTIL HFA/VENTOLIN HFA) 108 (90 BASE) MCG/ACT Inhaler Inhale 2 puffs into the lungs every 4 hours as needed for shortness of breath / dyspnea or wheezing 1 Inhaler 1     hydrochlorothiazide (HYDRODIURIL) 25 MG tablet Take 1 tablet (25 mg) by mouth daily 90 tablet 1     ibuprofen (ADVIL/MOTRIN) 200 MG tablet Take 400 mg by mouth every 12 hours as needed for mild pain       multivitamin, therapeutic with minerals (MULTI-VITAMIN) TABS tablet Take 1 tablet by mouth daily 100 tablet 3     ranitidine (ZANTAC) 150 MG tablet Take 150 mg by mouth as needed for heartburn       rosuvastatin (CRESTOR) 5 MG tablet Take 1 tablet (5 mg) by mouth daily 90 tablet 1       Alberto Cotton MD  [facility]          Active Problem List:     Patient Active Problem List    Diagnosis Date Noted     Tobacco use disorder 05/21/2018     Priority: Medium     Hyperlipidemia LDL goal <130 02/26/2018     Priority: Medium     Morbid obesity (H) 12/28/2017     Priority: Medium     Essential hypertension 10/02/2017     Priority: Medium     ACP (advance care planning) 06/27/2017     Priority: Medium     Advance Care Planning 6/27/2017: ACP Review of Chart / Resources Provided:  Reviewed chart for advance care plan.  Katharina SHELREY Mejias has no plan or code status on file. Code status updated and sent to provider for review. Advance Care Planning letter sent. Added by Ludmila Lagos             CARDIOVASCULAR SCREENING; LDL GOAL LESS THAN 160 02/10/2010     Priority: Medium     Ankylosing spondylitis (H)      Priority: Medium            History of Present Illness:     Chief Complaint   Patient presents with     RECHECK       April 16, 2018  Have you ever seen a rheumatologist Yes Who in [place] When at  least 10 years  Joint pain history  Onset: pt states that she is here due to has hx of ankylosing spondylitis, (HLA B27 positive) has been managing with ibuprofen. Has had iritis 2 years ago. Has some back pain that comes and  Goes    Involved joints: see above   Pain scale:  5.5/10  ; no chronic pain; if standing or walking a long distance or sitting for a great length of time, then hip pain  Wakes the patient from sleep : Yes/sometimes  Morning stiffness:Yes for 1 minutes  Meds used:ibuprofen not daily     Interim history  Since last visit:  1. Infections - Yes/ influenza in Feb. 2018  2. New symptoms/medical problem - No  3. Any side effects from Rheum medications -NA  3. ER visits/Hospitalizations/surgeries - No  4. Last PCP visit: 2/20/18    shortness of breath, cough attributed to smoking  Dry eyes attributed to computer use    Wt Readings from Last 4 Encounters:   06/04/18 (!) 152.5 kg (336 lb 1.6 oz)   05/21/18 (!) 150.9 kg (332 lb 9.6 oz)   04/20/18 (!) 150.7 kg (332 lb 3.2 oz)   02/20/18 (!) 151.6 kg (334 lb 4.8 oz)     No h/o unintentional weight loss, fevers, rash, swollen glands  No family or personal history of psoriasis, ulcerative colitis or chron's disease.   Patient denies any raynauds  No h/o persistent , chest pain  No h/o persistent vomiting, diarrhea, abdominal pain  No h/o hematochezia, hematuria, hemoptysis  No h/o seizures     June 4, 2018  Have you ever seen a rheumatologist Yes Who You When 4/20/18  Joint pain history  Onset: pt here for follow-up. Has noticed she is having lower back and hip pain.   Involved joints: see above  Pain scale:  5/10    no chronic pain; if standing or walking a long distance or sitting for a great length of time, then hip pain  Wakes the patient from sleep : Yes/ sometimes  Morning stiffness:Yes for 1 minutes  Meds used:nothing     Interim history  Since last visit:  1. Infections - No  2. New symptoms/medical problem - No  3. Any side effects from Rheum  "medications -NA  3. ER visits/Hospitalizations/surgeries - No  4. Last PCP visit: a couple weeks ago    BP Readings from Last 3 Encounters:   06/04/18 134/74   05/21/18 118/70   04/20/18 124/70          Review of Systems:   Complete ROS negative except for symptoms mentioned in the HPI          Past Medical History:     Past Medical History:   Diagnosis Date     Acute and subacute iridocyclitis, unspecified 8/05, 4/06     Ankylosing spondylitis (H)      Lumbago      Other and unspecified ovarian cyst 1985     Other cataract      Tobacco use disorder      Past Surgical History:   Procedure Laterality Date     NO HISTORY OF SURGERY              Social History:     Social History     Occupational History     / Other     Social History Main Topics     Smoking status: Current Every Day Smoker     Smokeless tobacco: Never Used     Alcohol use Yes      Comment: rarely     Drug use: No     Sexual activity: No            Family History:     Family History   Problem Relation Age of Onset     HEART DISEASE Mother      d. age 70 CHF, thought due to very low K     Blood Disease Mother       \"borderline hemophilia\", diagnosed after bleeding ulcer, treated with vit K, then had blood clot     Prostate Cancer Father      HEART DISEASE Maternal Uncle      DIABETES Daughter             Allergies:     Allergies   Allergen Reactions     Mold      Vomiting and diarrhea      Penicillins             Medications:     Current Outpatient Prescriptions   Medication Sig Dispense Refill     albuterol (PROAIR HFA/PROVENTIL HFA/VENTOLIN HFA) 108 (90 BASE) MCG/ACT Inhaler Inhale 2 puffs into the lungs every 4 hours as needed for shortness of breath / dyspnea or wheezing 1 Inhaler 1     hydrochlorothiazide (HYDRODIURIL) 25 MG tablet Take 1 tablet (25 mg) by mouth daily 90 tablet 1     ibuprofen (ADVIL/MOTRIN) 200 MG tablet Take 400 mg by mouth every 12 hours as needed for mild pain       multivitamin, therapeutic with minerals " "(MULTI-VITAMIN) TABS tablet Take 1 tablet by mouth daily 100 tablet 3     ranitidine (ZANTAC) 150 MG tablet Take 150 mg by mouth as needed for heartburn       rosuvastatin (CRESTOR) 5 MG tablet Take 1 tablet (5 mg) by mouth daily 90 tablet 1            Physical Exam:   Blood pressure 134/74, pulse 96, temperature 98.6  F (37  C), temperature source Oral, height 1.702 m (5' 7\"), weight (!) 152.5 kg (336 lb 1.6 oz), last menstrual period 07/09/2007, SpO2 95 %.  Wt Readings from Last 4 Encounters:   06/04/18 (!) 152.5 kg (336 lb 1.6 oz)   05/21/18 (!) 150.9 kg (332 lb 9.6 oz)   04/20/18 (!) 150.7 kg (332 lb 3.2 oz)   02/20/18 (!) 151.6 kg (334 lb 4.8 oz)       Constitutional: morbid obesity, appearing stated age; cooperative  Lymph: bilateral leg edema +++ present  Skin: no rash in exposed areas  Psych: nl judgement, orientation, memory, affect.         Data:         Alberto Cotton MD    [facility]    "

## 2018-06-04 ENCOUNTER — OFFICE VISIT (OUTPATIENT)
Dept: RHEUMATOLOGY | Facility: CLINIC | Age: 58
End: 2018-06-04
Payer: COMMERCIAL

## 2018-06-04 VITALS
BODY MASS INDEX: 45.99 KG/M2 | OXYGEN SATURATION: 95 % | HEART RATE: 96 BPM | WEIGHT: 293 LBS | TEMPERATURE: 98.6 F | DIASTOLIC BLOOD PRESSURE: 74 MMHG | HEIGHT: 67 IN | SYSTOLIC BLOOD PRESSURE: 134 MMHG

## 2018-06-04 DIAGNOSIS — H20.9 IRITIS: Primary | ICD-10-CM

## 2018-06-04 PROCEDURE — 99213 OFFICE O/P EST LOW 20 MIN: CPT | Performed by: INTERNAL MEDICINE

## 2018-06-04 NOTE — NURSING NOTE
"Chief Complaint   Patient presents with     RECHECK       Initial /74 (BP Location: Right arm, Patient Position: Chair, Cuff Size: Adult Large)  Pulse 96  Temp 98.6  F (37  C) (Oral)  Ht 1.702 m (5' 7\")  Wt (!) 152.5 kg (336 lb 1.6 oz)  LMP 07/09/2007  SpO2 95%  BMI 52.64 kg/m2 Estimated body mass index is 52.64 kg/(m^2) as calculated from the following:    Height as of this encounter: 1.702 m (5' 7\").    Weight as of this encounter: 152.5 kg (336 lb 1.6 oz).  Medication Reconciliation: complete    Have you ever seen a rheumatologist Yes Who You When 4/20/18  Joint pain history  Onset: pt here for follow-up. Has noticed she is having lower back and hip pain.   Involved joints: see above  Pain scale:  5/10     Wakes the patient from sleep : Yes/ sometimes  Morning stiffness:Yes for 1 minutes  Meds used:nothing    Interim history  Since last visit:  1. Infections - No  2. New symptoms/medical problem - No  3. Any side effects from Rheum medications -NA  3. ER visits/Hospitalizations/surgeries - No  4. Last PCP visit: a couple weeks ago  Wt Readings from Last 4 Encounters:   06/04/18 (!) 152.5 kg (336 lb 1.6 oz)   05/21/18 (!) 150.9 kg (332 lb 9.6 oz)   04/20/18 (!) 150.7 kg (332 lb 3.2 oz)   02/20/18 (!) 151.6 kg (334 lb 4.8 oz)     BP Readings from Last 3 Encounters:   06/04/18 134/74   05/21/18 118/70   04/20/18 124/70       "

## 2018-06-04 NOTE — MR AVS SNAPSHOT
"              After Visit Summary   6/4/2018    Katharina Mejias    MRN: [id number]           Patient Information     Date Of Birth          1960        Visit Information        Provider Department      6/4/2018 3:00 PM Alberto Cotton MD [facility]         Follow-ups after your visit        Follow-up notes from your care team     Return in about 1 year (around 6/4/2019).      Your next 10 appointments already scheduled     Aug 21, 2018  8:10 AM CDT   SHORT with Ayla Graf MD   [facility] ([facility])    [address]  [address]  [facility] [id number] [phone]              Who to contact     If you have questions or need follow up information about today's clinic visit or your schedule please contact [facility] directly at [phone].  Normal or non-critical lab and imaging results will be communicated to you by MyChart, letter or phone within 4 business days after the clinic has received the results. If you do not hear from us within 7 days, please contact the clinic through MyChart or phone. If you have a critical or abnormal lab result, we will notify you by phone as soon as possible.  Submit refill requests through Verious or call your pharmacy and they will forward the refill request to us. Please allow 3 business days for your refill to be completed.          Additional Information About Your Visit        Care EveryWhere ID     This is your Care EveryWhere ID. This could be used by other organizations to access your [place] medical records  [id number]        Your Vitals Were     Pulse Temperature Height Last Period Pulse Oximetry BMI (Body Mass Index)    96 98.6  F (37  C) (Oral) 1.702 m (5' 7\") 07/09/2007 95% 52.64 kg/m2       Blood Pressure from Last 3 Encounters:   06/04/18 134/74   05/21/18 118/70   04/20/18 124/70    Weight from Last 3 Encounters:   06/04/18 (!) 152.5 kg (336 lb 1.6 oz)   05/21/18 (!) 150.9 " kg (332 lb 9.6 oz)   04/20/18 (!) 150.7 kg (332 lb 3.2 oz)              Today, you had the following     No orders found for display       Primary Care Provider Office Phone # Fax #    Ayla Graf [phone] [address] [place] [address]        Equal Access to Services     [facility]: Hadii aad ku hadasho Soomaali, waaxda luqadaha, qaybta kaalmada adeegyada, waxay idiin hayaan adeeg kharash la'aan . So [facility] [phone].    ATENCIÓN: Si habla español, tiene a quintana disposición servicios gratuitos de asistencia lingüística. Llame al [phone].    We comply with applicable federal civil rights laws and [place] laws. We do not discriminate on the basis of race, color, national origin, age, disability, sex, sexual orientation, or gender identity.            Thank you!     Thank you for choosing [facility]  for your care. Our goal is always to provide you with excellent care. Hearing back from our patients is one way we can continue to improve our services. Please take a few minutes to complete the written survey that you may receive in the mail after your visit with us. Thank you!             Your Updated Medication List - Protect others around you: Learn how to safely use, store and throw away your medicines at www.disposemymeds.org.          This list is accurate as of 6/4/18  3:53 PM.  Always use your most recent med list.                   Brand Name Dispense Instructions for use Diagnosis    albuterol 108 (90 Base) MCG/ACT Inhaler    PROAIR HFA/PROVENTIL HFA/VENTOLIN HFA    1 Inhaler    Inhale 2 puffs into the lungs every 4 hours as needed for shortness of breath / dyspnea or wheezing    Rhonchi, Acute bronchitis with symptoms > 10 days       hydrochlorothiazide 25 MG tablet    HYDRODIURIL    90 tablet    Take 1 tablet (25 mg) by mouth daily    Essential hypertension, Hyperlipidemia LDL goal <130       ibuprofen 200 MG tablet    ADVIL/MOTRIN     Take  400 mg by mouth every 12 hours as needed for mild pain        Multi-vitamin Tabs tablet     100 tablet    Take 1 tablet by mouth daily        ranitidine 150 MG tablet    ZANTAC     Take 150 mg by mouth as needed for heartburn        rosuvastatin 5 MG tablet    CRESTOR    90 tablet    Take 1 tablet (5 mg) by mouth daily    Hyperlipidemia LDL goal <130, Essential hypertension

## 2018-06-16 ENCOUNTER — TELEPHONE (OUTPATIENT)
Dept: PEDIATRICS | Facility: CLINIC | Age: 58
End: 2018-06-16

## 2018-06-16 NOTE — TELEPHONE ENCOUNTER
The Patient declined Preventive Health Screens for: colonoscopy   Please review chart and follow-up with patient if needed.    Thank you

## 2018-08-21 ENCOUNTER — OFFICE VISIT (OUTPATIENT)
Dept: PEDIATRICS | Facility: CLINIC | Age: 58
End: 2018-08-21
Payer: COMMERCIAL

## 2018-08-21 VITALS
SYSTOLIC BLOOD PRESSURE: 132 MMHG | HEART RATE: 83 BPM | HEIGHT: 67 IN | TEMPERATURE: 98 F | OXYGEN SATURATION: 95 % | DIASTOLIC BLOOD PRESSURE: 70 MMHG | WEIGHT: 293 LBS | BODY MASS INDEX: 45.99 KG/M2

## 2018-08-21 DIAGNOSIS — E66.01 MORBID OBESITY (H): Primary | ICD-10-CM

## 2018-08-21 DIAGNOSIS — F17.200 TOBACCO USE DISORDER: ICD-10-CM

## 2018-08-21 PROCEDURE — 99214 OFFICE O/P EST MOD 30 MIN: CPT | Performed by: INTERNAL MEDICINE

## 2018-08-21 RX ORDER — VARENICLINE TARTRATE 1 MG/1
1 TABLET, FILM COATED ORAL 2 TIMES DAILY
Qty: 60 TABLET | Refills: 4 | Status: SHIPPED | OUTPATIENT
Start: 2018-08-21 | End: 2018-10-03

## 2018-08-21 RX ORDER — PHENTERMINE HYDROCHLORIDE 15 MG/1
15 CAPSULE ORAL EVERY MORNING
Qty: 30 CAPSULE | Refills: 0 | Status: SHIPPED | OUTPATIENT
Start: 2018-08-21 | End: 2018-10-03

## 2018-08-21 NOTE — MR AVS SNAPSHOT
After Visit Summary   8/21/2018    Katharina Mejias    MRN: [id number]           Patient Information     Date Of Birth          1960        Visit Information        Provider Department      8/21/2018 8:10 AM Ayla Graf MD [facility]        Today's Diagnoses     Morbid obesity (H)    -  1    Tobacco use disorder          Care Instructions    Good luck with quitting smoking! Possible side effects of chantix, possible headache, nausea/vomiting, insomnia, abnormal dreams. Follow dosing increase on the prescription instructions. If nausea occurs at 1mg twice daily, may decrease to once daily for 1-2 weeks, then try increasing dose again to twice daily Use for 3 months. You may extend use for total of 6 months if needed. No use of patch, gum or other nicotine replacement during treatment with chantix.            Follow-ups after your visit        Follow-up notes from your care team     Return in about 4 weeks (around 9/18/2018) for Follow Up Visit.      Who to contact     If you have questions or need follow up information about today's clinic visit or your schedule please contact [facility] directly at [phone].  Normal or non-critical lab and imaging results will be communicated to you by MyChart, letter or phone within 4 business days after the clinic has received the results. If you do not hear from us within 7 days, please contact the clinic through MyChart or phone. If you have a critical or abnormal lab result, we will notify you by phone as soon as possible.  Submit refill requests through [employer] or call your pharmacy and they will forward the refill request to us. Please allow 3 business days for your refill to be completed.          Additional Information About Your Visit        Care EveryWhere ID     This is your Care EveryWhere ID. This could be used by other organizations to access your [place] medical records  [id number]        Your Vitals Were      "Pulse Temperature Height Last Period Pulse Oximetry BMI (Body Mass Index)    83 98  F (36.7  C) (Oral) 5' 7\" (1.702 m) 07/09/2007 95% 52.69 kg/m2       Blood Pressure from Last 3 Encounters:   08/21/18 132/70   06/04/18 134/74   05/21/18 118/70    Weight from Last 3 Encounters:   08/21/18 (!) 336 lb 6.4 oz (152.6 kg)   06/04/18 (!) 336 lb 1.6 oz (152.5 kg)   05/21/18 (!) 332 lb 9.6 oz (150.9 kg)              Today, you had the following     No orders found for display         Today's Medication Changes          These changes are accurate as of 8/21/18  8:50 AM.  If you have any questions, ask your nurse or doctor.               Start taking these medicines.        Dose/Directions    phentermine 15 MG capsule   Used for:  Morbid obesity (H)   Started by:  Ayla Graf MD        Dose:  15 mg   Take 1 capsule (15 mg) by mouth every morning   Quantity:  30 capsule   Refills:  0       * varenicline 0.5 MG X 11 & 1 MG X 42 tablet   Commonly known as:  CHANTIX STARTING MONTH PAK   Used for:  Tobacco use disorder   Started by:  Ayla Graf MD        Take 0.5 mg tab daily for 3 days, then 0.5 mg tab twice daily for 4 days, then 1 mg twice daily.   Quantity:  53 tablet   Refills:  0       * varenicline 1 MG tablet   Commonly known as:  CHANTIX CONTINUING MONTH PETRA   Used for:  Tobacco use disorder   Started by:  Ayla Graf MD        Dose:  1 mg   Take 1 tablet (1 mg) by mouth 2 times daily   Quantity:  60 tablet   Refills:  4       * Notice:  This list has 2 medication(s) that are the same as other medications prescribed for you. Read the directions carefully, and ask your doctor or other care provider to review them with you.         Where to get your medicines      Some of these will need a paper prescription and others can be bought over the counter.  Ask your nurse if you have questions.     Bring a paper prescription for each of these medications     phentermine 15 MG capsule    varenicline 0.5 MG " X 11 & 1 MG X 42 tablet    varenicline 1 MG tablet                Primary Care Provider Office Phone # Fax #    Ayla Graf [phone]       [facility] [place] [address]        Equal Access to Services     DANILO DAVIDSON : Hadii aad ku hadmaryo Soomaali, waaxda luqadaha, qaybta kaalmada adeegyada, tutu abdallan nayeli cifuentes larafaelkarina tish. So [facility] [phone].    ATENCIÓN: Si habla español, tiene a quintana disposición servicios gratuitos de asistencia lingüística. Llame al [phone].    We comply with applicable federal civil rights laws and [place] laws. We do not discriminate on the basis of race, color, national origin, age, disability, sex, sexual orientation, or gender identity.            Thank you!     Thank you for choosing [facility]  for your care. Our goal is always to provide you with excellent care. Hearing back from our patients is one way we can continue to improve our services. Please take a few minutes to complete the written survey that you may receive in the mail after your visit with us. Thank you!             Your Updated Medication List - Protect others around you: Learn how to safely use, store and throw away your medicines at www.disposemymeds.org.          This list is accurate as of 8/21/18  8:50 AM.  Always use your most recent med list.                   Brand Name Dispense Instructions for use Diagnosis    albuterol 108 (90 Base) MCG/ACT inhaler    PROAIR HFA/PROVENTIL HFA/VENTOLIN HFA    1 Inhaler    Inhale 2 puffs into the lungs every 4 hours as needed for shortness of breath / dyspnea or wheezing    Rhonchi, Acute bronchitis with symptoms > 10 days       hydrochlorothiazide 25 MG tablet    HYDRODIURIL    90 tablet    Take 1 tablet (25 mg) by mouth daily    Essential hypertension, Hyperlipidemia LDL goal <130       ibuprofen 200 MG tablet    ADVIL/MOTRIN     Take 400 mg by mouth every 12 hours as needed for mild pain         Multi-vitamin Tabs tablet     100 tablet    Take 1 tablet by mouth daily        phentermine 15 MG capsule     30 capsule    Take 1 capsule (15 mg) by mouth every morning    Morbid obesity (H)       ranitidine 150 MG tablet    ZANTAC     Take 150 mg by mouth as needed for heartburn        rosuvastatin 5 MG tablet    CRESTOR    90 tablet    Take 1 tablet (5 mg) by mouth daily    Hyperlipidemia LDL goal <130, Essential hypertension       * varenicline 0.5 MG X 11 & 1 MG X 42 tablet    CHANTIX STARTING MONTH PETRA    53 tablet    Take 0.5 mg tab daily for 3 days, then 0.5 mg tab twice daily for 4 days, then 1 mg twice daily.    Tobacco use disorder       * varenicline 1 MG tablet    CHANTIX CONTINUING MONTH PETRA    60 tablet    Take 1 tablet (1 mg) by mouth 2 times daily    Tobacco use disorder       * Notice:  This list has 2 medication(s) that are the same as other medications prescribed for you. Read the directions carefully, and ask your doctor or other care provider to review them with you.

## 2018-08-21 NOTE — PATIENT INSTRUCTIONS
Good luck with quitting smoking! Possible side effects of chantix, possible headache, nausea/vomiting, insomnia, abnormal dreams. Follow dosing increase on the prescription instructions. If nausea occurs at 1mg twice daily, may decrease to once daily for 1-2 weeks, then try increasing dose again to twice daily Use for 3 months. You may extend use for total of 6 months if needed. No use of patch, gum or other nicotine replacement during treatment with chantix.

## 2018-08-21 NOTE — PROGRESS NOTES
"  SUBJECTIVE:   Katharina Mejias is a 58 year old female who presents to clinic today for the following health issues:      Discuss weight loss and smoking cessation    Follow up after seeing [facility], Dr Cotton.      Reports she is ready to lose weight and quit smoking. Her daughter has been doing very well on phentermine and has lost 50 pounds. She is eating healthy and lives down the [place] from her and invites her over for healthy supper. She has a smart phone ana maría that will help her with her diet/intake and is ready to start using this.    She denies chest pain/heaviness, soboe, edema. Feels well.    Would like to try chantix as well. Taking a cruise in 2020 and wants to be off cigarettes. Has never been on chantix before. Reports she quit smoking once before and gained 50 pounds. Doesn't want that to happen again.    Problem list and histories reviewed & adjusted, as indicated.  Additional history: as documented    Patient Active Problem List   Diagnosis     Ankylosing spondylitis (H)     CARDIOVASCULAR SCREENING; LDL GOAL LESS THAN 160     ACP (advance care planning)     Essential hypertension     Morbid obesity (H)     Hyperlipidemia LDL goal <130     Tobacco use disorder     Past Surgical History:   Procedure Laterality Date     NO HISTORY OF SURGERY         Social History   Substance Use Topics     Smoking status: Current Every Day Smoker     Smokeless tobacco: Never Used     Alcohol use Yes      Comment: rarely     Family History   Problem Relation Age of Onset     HEART DISEASE Mother      d. age 70 CHF, thought due to very low K     Blood Disease Mother       \"borderline hemophilia\", diagnosed after bleeding ulcer, treated with vit K, then had blood clot     Prostate Cancer Father      HEART DISEASE Maternal Uncle      Diabetes Daughter            Reviewed and updated as needed this visit by clinical staff       Reviewed and updated as needed this visit by Provider         ROS:  Constitutional, HEENT, " "cardiovascular, pulmonary, GI, , musculoskeletal, neuro, skin, endocrine and psych systems are negative, except as otherwise noted.    OBJECTIVE:     /70 (Cuff Size: Adult Large)  Pulse 83  Temp 98  F (36.7  C) (Oral)  Ht 5' 7\" (1.702 m)  Wt (!) 336 lb 6.4 oz (152.6 kg)  LMP 07/09/2007  SpO2 95%  BMI 52.69 kg/m2  Body mass index is 52.69 kg/(m^2).  GENERAL: healthy, alert and no distress  NECK: no adenopathy, no asymmetry, masses, or scars and thyroid normal to palpation  RESP: lungs clear to auscultation - no rales, rhonchi or wheezes  CV: regular rate and rhythm, normal S1 S2, no S3 or S4, no murmur, click or rub, no peripheral edema and peripheral pulses strong  ABDOMEN: soft, nontender, no hepatosplenomegaly, no masses and bowel sounds normal  MS: no gross musculoskeletal defects noted, no edema    Diagnostic Test Results:  none     ASSESSMENT/PLAN:     1. Morbid obesity (H)  Discussed options for weight loss. Phentermine, contrave, orlistat, lorcaserin, liraglutide. Discussed possible risks with phentermine including increased blood pressure, habit forming potential. Controlled substances prescribing practices reviewed. Plan follow up in 1 month. Consider increasing dose if doing well.   - phentermine 15 MG capsule; Take 1 capsule (15 mg) by mouth every morning  Dispense: 30 capsule; Refill: 0    2. Tobacco use disorder  She feels ready to quit and is requesting rx.   - varenicline (CHANTIX STARTING MONTH PETRA) 0.5 MG X 11 & 1 MG X 42 tablet; Take 0.5 mg tab daily for 3 days, then 0.5 mg tab twice daily for 4 days, then 1 mg twice daily.  Dispense: 53 tablet; Refill: 0  - varenicline (CHANTIX CONTINUING MONTH PETRA) 1 MG tablet; Take 1 tablet (1 mg) by mouth 2 times daily  Dispense: 60 tablet; Refill: 4    Patient Instructions   Good luck with quitting smoking! Possible side effects of chantix, possible headache, nausea/vomiting, insomnia, abnormal dreams. Follow dosing increase on the prescription " instructions. If nausea occurs at 1mg twice daily, may decrease to once daily for 1-2 weeks, then try increasing dose again to twice daily Use for 3 months. You may extend use for total of 6 months if needed. No use of patch, gum or other nicotine replacement during treatment with chantix.        Ayla Graf MD  [facility]

## 2018-10-03 ENCOUNTER — OFFICE VISIT (OUTPATIENT)
Dept: PEDIATRICS | Facility: CLINIC | Age: 58
End: 2018-10-03
Payer: COMMERCIAL

## 2018-10-03 VITALS
OXYGEN SATURATION: 96 % | SYSTOLIC BLOOD PRESSURE: 124 MMHG | WEIGHT: 293 LBS | TEMPERATURE: 98.1 F | BODY MASS INDEX: 45.99 KG/M2 | HEIGHT: 67 IN | DIASTOLIC BLOOD PRESSURE: 74 MMHG | HEART RATE: 95 BPM

## 2018-10-03 DIAGNOSIS — F41.9 ANXIETY: ICD-10-CM

## 2018-10-03 DIAGNOSIS — E66.01 MORBID OBESITY (H): Primary | ICD-10-CM

## 2018-10-03 DIAGNOSIS — R42 LIGHTHEADEDNESS: ICD-10-CM

## 2018-10-03 DIAGNOSIS — F17.200 TOBACCO USE DISORDER: ICD-10-CM

## 2018-10-03 PROCEDURE — 93000 ELECTROCARDIOGRAM COMPLETE: CPT | Performed by: INTERNAL MEDICINE

## 2018-10-03 PROCEDURE — 99214 OFFICE O/P EST MOD 30 MIN: CPT | Mod: 25 | Performed by: INTERNAL MEDICINE

## 2018-10-03 PROCEDURE — 90682 RIV4 VACC RECOMBINANT DNA IM: CPT | Performed by: INTERNAL MEDICINE

## 2018-10-03 PROCEDURE — 90471 IMMUNIZATION ADMIN: CPT | Performed by: INTERNAL MEDICINE

## 2018-10-03 RX ORDER — PHENTERMINE HYDROCHLORIDE 15 MG/1
15 CAPSULE ORAL EVERY MORNING
Qty: 30 CAPSULE | Refills: 0 | Status: SHIPPED | OUTPATIENT
Start: 2018-10-03 | End: 2018-11-05

## 2018-10-03 NOTE — MR AVS SNAPSHOT
"              After Visit Summary   10/3/2018    Katharina Mejias    MRN: [id number]           Patient Information     Date Of Birth          1960        Visit Information        Provider Department      10/3/2018 1:50 PM [facility]        Today's Diagnoses     Need for prophylactic vaccination and inoculation against influenza    -  1    Dizziness        Palpitations        Morbid obesity (H)           Follow-ups after your visit        Follow-up notes from your care team     Return in about 4 weeks (around 10/31/2018) for Follow Up Visit.      Who to contact     If you have questions or need follow up information about today's clinic visit or your schedule please contact [facility] directly at [phone].  Normal or non-critical lab and imaging results will be communicated to you by MyChart, letter or phone within 4 business days after the clinic has received the results. If you do not hear from us within 7 days, please contact the clinic through MyChart or phone. If you have a critical or abnormal lab result, we will notify you by phone as soon as possible.  Submit refill requests through [employer] or call your pharmacy and they will forward the refill request to us. Please allow 3 business days for your refill to be completed.          Additional Information About Your Visit        MyChart Information     [employer] lets you send messages to your doctor, view your test results, renew your prescriptions, schedule appointments and more. To sign up, go to [email or web address] . Click on \"Log in\" on the left side of the screen, which will take you to the Welcome page. Then click on \"Sign up Now\" on the right side of the page.     You will be asked to enter the access code listed below, as well as some personal information. Please follow the directions to create your username and password.     Your access code is: [email or web address]  Expires: 1/1/2019  3:13 PM     Your " "access code will  in 90 days. If you need help or a new code, please call your [place] clinic or [phone].        Care EveryWhere ID     This is your Care EveryWhere ID. This could be used by other organizations to access your [place] medical records  [id number]        Your Vitals Were     Pulse Temperature Height Last Period Pulse Oximetry BMI (Body Mass Index)    95 98.1  F (36.7  C) (Oral) 5' 7\" (1.702 m) 2007 96% 51.06 kg/m2       Blood Pressure from Last 3 Encounters:   10/03/18 124/74   18 132/70   18 134/74    Weight from Last 3 Encounters:   10/03/18 326 lb (147.9 kg)   18 (!) 336 lb 6.4 oz (152.6 kg)   18 (!) 336 lb 1.6 oz (152.5 kg)              We Performed the Following     EKG 12-lead complete w/read - Clinics     FLU VACCINE, (RIV4) RECOMBINANT HA  , IM (FluBlok, egg free) [94227]- >18 YRS (FMG recommended  50-64 YRS)     Vaccine Administration, Initial [50541]          Where to get your medicines      Some of these will need a paper prescription and others can be bought over the counter.  Ask your nurse if you have questions.     Bring a paper prescription for each of these medications     phentermine 15 MG capsule          Primary Care Provider Office Phone # Fax #    Ayla Graf [phone] [address] [place] [address]        Equal Access to Services     [facility]: Hadii dulce rojas Somojgan, waaxda luqadaha, qaybta kaalmada tutu mathias. So [facility] [phone].    ATENCIÓN: Si habla español, tiene a quintana disposición servicios gratuitos de asistencia lingüística. Llame al [phone].    We comply with applicable federal civil rights laws and [place] laws. We do not discriminate on the basis of race, color, national origin, age, disability, sex, sexual orientation, or gender identity.            Thank you!     Thank you for choosing [facility]  for your " care. Our goal is always to provide you with excellent care. Hearing back from our patients is one way we can continue to improve our services. Please take a few minutes to complete the written survey that you may receive in the mail after your visit with us. Thank you!             Your Updated Medication List - Protect others around you: Learn how to safely use, store and throw away your medicines at www.disposemymeds.org.          This list is accurate as of 10/3/18  3:21 PM.  Always use your most recent med list.                   Brand Name Dispense Instructions for use Diagnosis    albuterol 108 (90 Base) MCG/ACT inhaler    PROAIR HFA/PROVENTIL HFA/VENTOLIN HFA    1 Inhaler    Inhale 2 puffs into the lungs every 4 hours as needed for shortness of breath / dyspnea or wheezing    Rhonchi, Acute bronchitis with symptoms > 10 days       hydrochlorothiazide 25 MG tablet    HYDRODIURIL    90 tablet    Take 1 tablet (25 mg) by mouth daily    Essential hypertension, Hyperlipidemia LDL goal <130       ibuprofen 200 MG tablet    ADVIL/MOTRIN     Take 400 mg by mouth every 12 hours as needed for mild pain        Multi-vitamin Tabs tablet     100 tablet    Take 1 tablet by mouth daily        phentermine 15 MG capsule     30 capsule    Take 1 capsule (15 mg) by mouth every morning    Morbid obesity (H)       ranitidine 150 MG tablet    ZANTAC     Take 150 mg by mouth as needed for heartburn        rosuvastatin 5 MG tablet    CRESTOR    90 tablet    Take 1 tablet (5 mg) by mouth daily    Hyperlipidemia LDL goal <130, Essential hypertension

## 2018-10-03 NOTE — PROGRESS NOTES
SUBJECTIVE:   Katharina Mejias is a 58 year old female who presents to clinic today for the following health issues:      Medication Followup     Taking Medication as prescribed: yes on the phentermine but not the chantix    Side Effects:  Was taking chantix, and even in the first week was not happy, even had the thought I wonder what would happen if I took the whole bottle of b/p medicines. She reports she realized that this was not her and from the chantix, as we had discussed possible side effects, so she stopped it. She reports she feels back to herself now.     Medication Helping Symptoms:  Yes phentermine, lost 10 pounds, very happy with results. Feels she is not hungry. Daughter has done very well with this medication as well.     Has had a few episodes of anxiety and felt her heart beating a little faster than usual at work during very stressful situations. Reports the exact same episodes years ago before starting blood pressure medication when she was stressed. These episodes aren't uncomfortable, she just feels anxious about something and feel like her heart is pounding, but not irregular or going fast. No chest discomfort, nausea, diaphoresis. She was able to take deep breaths and calm down and was back to normal within a few minutes by just taking a break from the stressful situation. She reports a significantly decreased appetite with the phentermine, and reports a few days she has been in a rush in the morning, has forgotten to eat breakfast, and right before lunch she had lightheaded episodes while sitting at her desk at work. These episodes last around 15 seconds and resolve with no residual effects. Last episode was 2 days ago. Monday hadn't eaten anything all day and had been 16 hours without eating.     Has lost 10 pounds. Is eating a low salt diet with minimal processed foods.     Problem list and histories reviewed & adjusted, as indicated.  Additional history: as documented    Patient  "Active Problem List   Diagnosis     Ankylosing spondylitis (H)     CARDIOVASCULAR SCREENING; LDL GOAL LESS THAN 160     ACP (advance care planning)     Essential hypertension     Morbid obesity (H)     Hyperlipidemia LDL goal <130     Tobacco use disorder     Past Surgical History:   Procedure Laterality Date     NO HISTORY OF SURGERY         Social History   Substance Use Topics     Smoking status: Current Every Day Smoker     Smokeless tobacco: Never Used     Alcohol use Yes      Comment: rarely     Family History   Problem Relation Age of Onset     HEART DISEASE Mother      d. age 70 CHF, thought due to very low K     Blood Disease Mother       \"borderline hemophilia\", diagnosed after bleeding ulcer, treated with vit K, then had blood clot     Prostate Cancer Father      HEART DISEASE Maternal Uncle      Diabetes Daughter          Current Outpatient Prescriptions   Medication Sig Dispense Refill     hydrochlorothiazide (HYDRODIURIL) 25 MG tablet Take 1 tablet (25 mg) by mouth daily 90 tablet 1     ibuprofen (ADVIL/MOTRIN) 200 MG tablet Take 400 mg by mouth every 12 hours as needed for mild pain       multivitamin, therapeutic with minerals (MULTI-VITAMIN) TABS tablet Take 1 tablet by mouth daily 100 tablet 3     phentermine 15 MG capsule Take 1 capsule (15 mg) by mouth every morning 30 capsule 0     ranitidine (ZANTAC) 150 MG tablet Take 150 mg by mouth as needed for heartburn       rosuvastatin (CRESTOR) 5 MG tablet Take 1 tablet (5 mg) by mouth daily 90 tablet 1     albuterol (PROAIR HFA/PROVENTIL HFA/VENTOLIN HFA) 108 (90 BASE) MCG/ACT Inhaler Inhale 2 puffs into the lungs every 4 hours as needed for shortness of breath / dyspnea or wheezing (Patient not taking: Reported on 10/3/2018) 1 Inhaler 1     BP Readings from Last 3 Encounters:   10/03/18 124/74   08/21/18 132/70   06/04/18 134/74    Wt Readings from Last 3 Encounters:   10/03/18 326 lb (147.9 kg)   08/21/18 (!) 336 lb 6.4 oz (152.6 kg)   06/04/18 (!) " "336 lb 1.6 oz (152.5 kg)                    Reviewed and updated as needed this visit by clinical staff       Reviewed and updated as needed this visit by Provider         ROS:  Constitutional, HEENT, cardiovascular, pulmonary, GI, , musculoskeletal, neuro, skin, endocrine and psych systems are negative, except as otherwise noted.    OBJECTIVE:     /74 (Cuff Size: Adult Large)  Pulse 95  Temp 98.1  F (36.7  C) (Oral)  Ht 5' 7\" (1.702 m)  Wt 326 lb (147.9 kg)  LMP 07/09/2007  SpO2 96%  BMI 51.06 kg/m2  Body mass index is 51.06 kg/(m^2).  GENERAL: healthy, alert and no distress  NECK: no adenopathy, no asymmetry, masses, or scars and thyroid normal to palpation, no carotid bruits  RESP: lungs clear to auscultation - no rales, rhonchi or wheezes  CV: regular rate and rhythm, normal S1 S2, no S3 or S4, no murmur, click or rub, significant bilateral lymphedema, peripheral pulses strong  ABDOMEN: soft, nontender, no hepatosplenomegaly, no masses and bowel sounds normal  MS: no gross musculoskeletal defects noted, no edema    Diagnostic Test Results:  EKG: NSR, incomplete RBBB    ASSESSMENT/PLAN:     1. Morbid obesity (H)  Weight down 10 pounds. She is feeling like the phentermine is helping her with appetite and she is very optimistic about her success and very much wants to continue this medication. Blood pressure in good control today. We discussed whether her symptoms of anxiety with pounding heart and lightheaded episodes were related to phentermine. We discussed that phentermine can increase anxiety so may precipitate anxiety attacks, but may also precipitate palpitations/tachycardia. Discussed brief nature of lightheaded episodes and whether these were due to not eating for a prolonged period. Discussed risks of phentermine including heart rhythm disturbance, ischemia, headache. She would like to continue with the medication. We discussed potential switch to phentermine-topiramate for long term " management in future.   - phentermine 15 MG capsule; Take 1 capsule (15 mg) by mouth every morning  Dispense: 30 capsule; Refill: 0    2. Anxiety  She describes episodes of anxiety accompanied by sensation of heart beating a little faster than usual. Episodes resolve within a few minutes when she works on calming down and relaxing. Not new on phentermine, but hasn't had for a while. EKG today is nonspecific, with incomplete RBBB. I recommended doing holter monitor/14 day monitor, but since episodes not new, and she is very concerned about cost, she declines. Discussed that without monitor, we are unable to confirm that these episodes are just anxiety. We discussed that risks of uncontrolled morbid obesity are high, and that the likelihood of these events being cardiac are low, so as above, will proceed with phentermine.    3. Lightheadedness  Very brief pisodes only when she hasn't eaten lunch after 16 hours fasting. Exam normal. Not exertional. Discussed importance of eating regular meals. She will let me know if she has any further episodes.     4. Tobacco use disorder  Off chantix now. Not ready to attempt to quit at this time.      FUTURE APPOINTMENTS:       - Follow-up visit in 1 month    Ayla Graf MD  [facility]    Injectable Influenza Immunization Documentation    1.  Is the person to be vaccinated sick today?   No    2. Does the person to be vaccinated have an allergy to a component   of the vaccine?   No  Egg Allergy Algorithm Link    3. Has the person to be vaccinated ever had a serious reaction   to influenza vaccine in the past?   No    4. Has the person to be vaccinated ever had Guillain-Barré syndrome?   No    Form completed by Sally Bowers LPN

## 2018-10-08 PROBLEM — F41.9 ANXIETY: Status: ACTIVE | Noted: 2018-10-08

## 2018-11-05 ENCOUNTER — OFFICE VISIT (OUTPATIENT)
Dept: PEDIATRICS | Facility: CLINIC | Age: 58
End: 2018-11-05
Payer: COMMERCIAL

## 2018-11-05 VITALS
OXYGEN SATURATION: 94 % | HEART RATE: 89 BPM | SYSTOLIC BLOOD PRESSURE: 118 MMHG | TEMPERATURE: 98 F | WEIGHT: 293 LBS | DIASTOLIC BLOOD PRESSURE: 70 MMHG | HEIGHT: 67 IN | BODY MASS INDEX: 45.99 KG/M2

## 2018-11-05 DIAGNOSIS — K59.03 DRUG-INDUCED CONSTIPATION: ICD-10-CM

## 2018-11-05 DIAGNOSIS — Z23 NEED FOR PROPHYLACTIC VACCINATION AND INOCULATION AGAINST INFLUENZA: ICD-10-CM

## 2018-11-05 DIAGNOSIS — E78.5 HYPERLIPIDEMIA LDL GOAL <130: ICD-10-CM

## 2018-11-05 DIAGNOSIS — I10 ESSENTIAL HYPERTENSION: ICD-10-CM

## 2018-11-05 DIAGNOSIS — E66.01 MORBID OBESITY (H): Primary | ICD-10-CM

## 2018-11-05 PROCEDURE — 90471 IMMUNIZATION ADMIN: CPT | Performed by: INTERNAL MEDICINE

## 2018-11-05 PROCEDURE — 90682 RIV4 VACC RECOMBINANT DNA IM: CPT | Performed by: INTERNAL MEDICINE

## 2018-11-05 PROCEDURE — 99214 OFFICE O/P EST MOD 30 MIN: CPT | Mod: 25 | Performed by: INTERNAL MEDICINE

## 2018-11-05 RX ORDER — PHENTERMINE HYDROCHLORIDE 15 MG/1
15 CAPSULE ORAL EVERY MORNING
Qty: 30 CAPSULE | Refills: 0 | Status: SHIPPED | OUTPATIENT
Start: 2018-11-05 | End: 2018-12-03 | Stop reason: ALTCHOICE

## 2018-11-05 RX ORDER — HYDROCHLOROTHIAZIDE 25 MG/1
25 TABLET ORAL DAILY
Qty: 90 TABLET | Refills: 1 | Status: SHIPPED | OUTPATIENT
Start: 2018-11-05 | End: 2019-03-20

## 2018-11-05 RX ORDER — ROSUVASTATIN CALCIUM 5 MG/1
5 TABLET, COATED ORAL DAILY
Qty: 90 TABLET | Refills: 1 | Status: SHIPPED | OUTPATIENT
Start: 2018-11-05 | End: 2019-03-20

## 2018-11-05 NOTE — MR AVS SNAPSHOT
After Visit Summary   11/5/2018    Katharina Mejias    MRN: [id number]           Patient Information     Date Of Birth          1960        Visit Information        Provider Department      11/5/2018 9:10 AM A[facility]f MD [facility]        Today's Diagnoses     Need for prophylactic vaccination and inoculation against influenza    -  1    Morbid obesity (H)        Essential hypertension        Hyperlipidemia LDL goal <130          Care Instructions    Check with your insurance to see if the phentermine-topiramate is covered. (Qsymia) and let me know.   We could add topiramate prior to your next visit.    Start a stool softener twice daily. If still having hard stools, switch to senna-s twice daily.    I do recommend doing calorie counting.     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 We might consider making your appointment by March so you could be seen in May.    Try to add some form of physical activity 5 days a week. 20-30 minutes.                   Follow-ups after your visit        Follow-up notes from your care team     Return in about 4 weeks (around 12/3/2018) for Follow Up Visit.      Who to contact     If you have questions or need follow up information about today's clinic visit or your schedule please contact [facility] directly at [phone].  Normal or non-critical lab and imaging results will be communicated to you by MyChart, letter or phone within 4 business days after the clinic has received the results. If you do not hear from us within 7 days, please contact the clinic through MyChart or phone. If you have a critical or abnormal lab result, we will notify you by phone as soon as possible.  Submit refill requests through [employer] or call your pharmacy and they will forward the refill request to us. Please allow 3 business days for your refill to be completed.          Additional Information About Your  "Visit        [employer] Information     [employer] lets you send messages to your doctor, view your test results, renew your prescriptions, schedule appointments and more. To sign up, go to [email or web address] . Click on \"Log in\" on the left side of the screen, which will take you to the Welcome page. Then click on \"Sign up Now\" on the right side of the page.     You will be asked to enter the access code listed below, as well as some personal information. Please follow the directions to create your username and password.     Your access code is: [email or web address]  Expires: 2019  2:13 PM     Your access code will  in 90 days. If you need help or a new code, please call your [facility] clinic or [phone].        Care EveryWhere ID     This is your Care EveryWhere ID. This could be used by other organizations to access your [facility] medical records  [id number]        Your Vitals Were     Pulse Temperature Height Last Period Pulse Oximetry BMI (Body Mass Index)    89 98  F (36.7  C) (Oral) 5' 7\" (1.702 m) 2007 94% 50.23 kg/m2       Blood Pressure from Last 3 Encounters:   18 118/70   10/03/18 124/74   18 132/70    Weight from Last 3 Encounters:   18 320 lb 11.2 oz (145.5 kg)   10/03/18 326 lb (147.9 kg)   18 (!) 336 lb 6.4 oz (152.6 kg)              We Performed the Following     FLU VACCINE, (RIV4) RECOMBINANT HA  , IM (FluBlok, egg free) [32264]- >18 YRS ([facility] recommended  50-64 YRS)     Vaccine Administration, Initial [00490]          Where to get your medicines      These medications were sent to [facility] [address]   [address]  [address], [place] [address]     Phone:  [phone]     hydrochlorothiazide 25 MG tablet    rosuvastatin 5 MG tablet         Some of these will need a paper prescription and others can be bought over the counter.  Ask your nurse if you have questions.     Bring a paper prescription for each of " these medications     phentermine 15 MG capsule          Primary Care Provider Office Phone # Fax #    Ayla Graf [phone] [address] [place] [address]        Equal Access to Services     BREANNA DAVIDSON : Hadvenkat dulce ku rashelo Somojgan, waaxda luqadaha, qaybta kaalmada adeegyada, tutu erwinmoraima winston. So [facility] [phone].    ATENCIÓN: Si habla español, tiene a quintana disposición servicios gratuitos de asistencia lingüística. Llame al [phone].    We comply with applicable federal civil rights laws and [place] laws. We do not discriminate on the basis of race, color, national origin, age, disability, sex, sexual orientation, or gender identity.            Thank you!     Thank you for choosing [facility]  for your care. Our goal is always to provide you with excellent care. Hearing back from our patients is one way we can continue to improve our services. Please take a few minutes to complete the written survey that you may receive in the mail after your visit with us. Thank you!             Your Updated Medication List - Protect others around you: Learn how to safely use, store and throw away your medicines at www.disposemymeds.org.          This list is accurate as of 11/5/18 10:08 AM.  Always use your most recent med list.                   Brand Name Dispense Instructions for use Diagnosis    albuterol 108 (90 Base) MCG/ACT inhaler    PROAIR HFA/PROVENTIL HFA/VENTOLIN HFA    1 Inhaler    Inhale 2 puffs into the lungs every 4 hours as needed for shortness of breath / dyspnea or wheezing    Rhonchi, Acute bronchitis with symptoms > 10 days       hydrochlorothiazide 25 MG tablet    HYDRODIURIL    90 tablet    Take 1 tablet (25 mg) by mouth daily    Essential hypertension, Hyperlipidemia LDL goal <130       ibuprofen 200 MG tablet    ADVIL/MOTRIN     Take 400 mg by mouth every 12 hours as needed for mild pain        Multi-vitamin Tabs  tablet     100 tablet    Take 1 tablet by mouth daily        phentermine 15 MG capsule     30 capsule    Take 1 capsule (15 mg) by mouth every morning    Morbid obesity (H)       ranitidine 150 MG tablet    ZANTAC     Take 150 mg by mouth as needed for heartburn        rosuvastatin 5 MG tablet    CRESTOR    90 tablet    Take 1 tablet (5 mg) by mouth daily    Hyperlipidemia LDL goal <130, Essential hypertension

## 2018-11-05 NOTE — PROGRESS NOTES
"  SUBJECTIVE:   Katharina Mejias is a 58 year old female who presents to clinic today for the following health issues:      Medication Followup of PHENTERMINE    Taking Medication as prescribed: yes    Side Effects:  None    Medication Helping Symptoms:  yes     Lost another 6 pounds overall since last visit. Had a very bad cold and didn't eat for 2 days, then after that cheated a little so weight went back up 6 pounds, but is still 6 pounds down in the last month. States she will be working with her daughter on making a meal plan. Is also considering starting to count calories.     No further episodes of lightheadedness, no palpitations.     Her only possible side effect is constipation.    Problem list and histories reviewed & adjusted, as indicated.  Additional history: as documented    Patient Active Problem List   Diagnosis     Ankylosing spondylitis (H)     CARDIOVASCULAR SCREENING; LDL GOAL LESS THAN 160     ACP (advance care planning)     Essential hypertension     Morbid obesity (H)     Hyperlipidemia LDL goal <130     Tobacco use disorder     Anxiety     Past Surgical History:   Procedure Laterality Date     NO HISTORY OF SURGERY         Social History   Substance Use Topics     Smoking status: Current Every Day Smoker     Smokeless tobacco: Never Used     Alcohol use Yes      Comment: rarely     Family History   Problem Relation Age of Onset     HEART DISEASE Mother      d. age 70 CHF, thought due to very low K     Blood Disease Mother       \"borderline hemophilia\", diagnosed after bleeding ulcer, treated with vit K, then had blood clot     Prostate Cancer Father      HEART DISEASE Maternal Uncle      Diabetes Daughter            Reviewed and updated as needed this visit by clinical staff       Reviewed and updated as needed this visit by Provider         ROS:  Constitutional, HEENT, cardiovascular, pulmonary, GI, , musculoskeletal, neuro, skin, endocrine and psych systems are negative, except as " "otherwise noted.    OBJECTIVE:     /70 (Cuff Size: Adult Large)  Pulse 89  Temp 98  F (36.7  C) (Oral)  Ht 5' 7\" (1.702 m)  Wt 320 lb 11.2 oz (145.5 kg)  LMP 07/09/2007  SpO2 94%  BMI 50.23 kg/m2  Body mass index is 50.23 kg/(m^2).  GENERAL: healthy, alert and no distress  NECK: no adenopathy, no asymmetry, masses, or scars and thyroid normal to palpation  RESP: lungs clear to auscultation - no rales, rhonchi or wheezes  CV: regular rate and rhythm, normal S1 S2, no S3 or S4, no murmur, click or rub, no peripheral edema and peripheral pulses strong  ABDOMEN: soft, nontender, no hepatosplenomegaly, no masses and bowel sounds normal  MS: no gross musculoskeletal defects noted, no edema    Diagnostic Test Results:  none     ASSESSMENT/PLAN:     1. Morbid obesity (H)  Continues to lose weight on phentermine. Discussed switch to phentermine-topiramate. Offered weight management visit, but she declines at this time. Will consider once tax season is over next summer.  - phentermine 15 MG capsule; Take 1 capsule (15 mg) by mouth every morning  Dispense: 30 capsule; Refill: 0    Discussed adding stool softener twice daily for her constipation while on phentermine.    2. Essential hypertension  Good control  - hydrochlorothiazide (HYDRODIURIL) 25 MG tablet; Take 1 tablet (25 mg) by mouth daily  Dispense: 90 tablet; Refill: 1  - rosuvastatin (CRESTOR) 5 MG tablet; Take 1 tablet (5 mg) by mouth daily  Dispense: 90 tablet; Refill: 1    3. Hyperlipidemia LDL goal <130  Tolerating statin  - hydrochlorothiazide (HYDRODIURIL) 25 MG tablet; Take 1 tablet (25 mg) by mouth daily  Dispense: 90 tablet; Refill: 1  - rosuvastatin (CRESTOR) 5 MG tablet; Take 1 tablet (5 mg) by mouth daily  Dispense: 90 tablet; Refill: 1    4. Need for prophylactic vaccination and inoculation against influenza    - FLU VACCINE, (RIV4) RECOMBINANT HA  , IM (FluBlok, egg free) [03042]- >18 YRS ([facility] recommended  50-64 YRS)  - Vaccine " Administration, Initial [99808]    Patient Instructions   Check with your insurance to see if the phentermine-topiramate is covered. (Qsymia) and let me know.   We could add topiramate prior to your next visit.    Start a stool softener twice daily. If still having hard stools, switch to senna-s twice daily.    I do recommend doing calorie counting.     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 We might consider making your appointment by March so you could be seen in May.    Try to add some form of physical activity 5 days a week. 20-30 minutes.               Ayla Graf MD  [facility]    Injectable Influenza Immunization Documentation    1.  Is the person to be vaccinated sick today?   No    2. Does the person to be vaccinated have an allergy to a component   of the vaccine?   No  Egg Allergy Algorithm Link    3. Has the person to be vaccinated ever had a serious reaction   to influenza vaccine in the past?   No    4. Has the person to be vaccinated ever had Guillain-Barré syndrome?   No    Form completed by Sally Bowers LPN

## 2018-11-05 NOTE — PATIENT INSTRUCTIONS
Check with your insurance to see if the phentermine-topiramate is covered. (Qsymia) and let me know.   We could add topiramate prior to your next visit.    Start a stool softener twice daily. If still having hard stools, switch to senna-s twice daily.    I do recommend doing calorie counting.     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 We might consider making your appointment by March so you could be seen in May.    Try to add some form of physical activity 5 days a week. 20-30 minutes.

## 2018-12-03 ENCOUNTER — OFFICE VISIT (OUTPATIENT)
Dept: PEDIATRICS | Facility: CLINIC | Age: 58
End: 2018-12-03
Payer: COMMERCIAL

## 2018-12-03 VITALS
HEART RATE: 80 BPM | HEIGHT: 67 IN | TEMPERATURE: 97.4 F | DIASTOLIC BLOOD PRESSURE: 70 MMHG | WEIGHT: 293 LBS | BODY MASS INDEX: 45.99 KG/M2 | SYSTOLIC BLOOD PRESSURE: 112 MMHG | OXYGEN SATURATION: 98 %

## 2018-12-03 DIAGNOSIS — E66.01 MORBID OBESITY (H): Primary | ICD-10-CM

## 2018-12-03 DIAGNOSIS — I10 ESSENTIAL HYPERTENSION: ICD-10-CM

## 2018-12-03 PROCEDURE — 99214 OFFICE O/P EST MOD 30 MIN: CPT | Performed by: INTERNAL MEDICINE

## 2018-12-03 NOTE — PROGRESS NOTES
"  SUBJECTIVE:   Katharina Mejias is a 58 year old female who presents to clinic today for the following health issues:      Medication Followup of phentermine    Taking Medication as prescribed: yes    Side Effects:  None    Medication Helping Symptoms:  yes     Not more hungry, but is starting to do more habit eating, especially in the evenings. She usually eats dinner with her daughter, who also struggles with weight issues. More recently they have been going out to restaurants, etc and she has been finding it difficult to eat the smaller amount she needs.     She is in her very busy season at work, and until April it will be very difficult for her to get in. She is worried about needing to come in monthly for visits in clinic.    She reports she is doing a health challenge at work, which will last through the busy season.     Does have some periods of time where she forgets to eat because she gets busy and often doesn't eat breakfast on non-work days. Does eat at least every 6 hours on work days.    Taking hydrochlorothiazide daily.    Problem list and histories reviewed & adjusted, as indicated.  Additional history: as documented    Patient Active Problem List   Diagnosis     Ankylosing spondylitis (H)     CARDIOVASCULAR SCREENING; LDL GOAL LESS THAN 160     ACP (advance care planning)     Essential hypertension     Morbid obesity (H)     Hyperlipidemia LDL goal <130     Tobacco use disorder     Anxiety     Past Surgical History:   Procedure Laterality Date     NO HISTORY OF SURGERY         Social History   Substance Use Topics     Smoking status: Current Every Day Smoker     Smokeless tobacco: Never Used     Alcohol use Yes      Comment: rarely     Family History   Problem Relation Age of Onset     Heart Disease Mother      d. age 70 CHF, thought due to very low K     Blood Disease Mother       \"borderline hemophilia\", diagnosed after bleeding ulcer, treated with vit K, then had blood clot     Prostate Cancer " "Father      Heart Disease Maternal Uncle      Diabetes Daughter            Reviewed and updated as needed this visit by clinical staff  Tobacco  Allergies  Med Hx  Surg Hx  Fam Hx  Soc Hx      Reviewed and updated as needed this visit by Provider         ROS:  Constitutional, HEENT, cardiovascular, pulmonary, gi and gu systems are negative, except as otherwise noted.    OBJECTIVE:     /70 (Cuff Size: Adult Large)  Pulse 80  Temp 97.4  F (36.3  C) (Oral)  Ht 5' 7\" (1.702 m)  Wt 319 lb 9.6 oz (145 kg)  LMP 07/09/2007  SpO2 98%  BMI 50.06 kg/m2  Body mass index is 50.06 kg/(m^2).  GENERAL: healthy, alert and no distress    Diagnostic Test Results:  none     ASSESSMENT/PLAN:     1. Morbid obesity (H)  Has lost 5% of body weight in 12 weeks on phentermine. Discussed options at this time. Phentermine-topiramate combination not covered by her insurance. Discussed lorcaserin, liraglutide, orlistat. She would like to switch to lorcaserin. rx done. Discussed coming in to see me in 1 month, but she is very worried about the added stress of missing work for this during busy season. She will mychart me monthly with her weight and any concerns, and we will meet in3 months. Also discussed option to see weight management, or more intensive program such as 24 week program offered. She declines at this time due to work. May consider after April.  - Lorcaserin HCl ER 20 MG TB24; Take 20 mg by mouth daily  Dispense: 30 tablet; Refill: 2    2. Essential hypertension  Good control. If blood pressures decrease further, may be able to stop her hydrochlorothiazide.      Patient Instructions   Weigh yourself weekly and call or mychart me with your weight once monthly.  Let's meet in 3 months.  Start lorcaserin 20 mg once daily.  Check with your insurance to see if you can have a health .  Consider seeing a psychologist if you are struggling more with habit-eating.    I spent 25 minutes with this patient face-to-face, " of which 50% or greater was spent in counseling and coordination of care with regards to weight management options for obesity, hypertension. Please see plan above.    Ayla Graf MD  [facility]

## 2018-12-03 NOTE — MR AVS SNAPSHOT
"              After Visit Summary   12/3/2018    Katharina Mejias    MRN: [id number]           Patient Information     Date Of Birth          1960        Visit Information        Provider Department      12/3/2018 7:50 AM Ayla Graf MD [facility]        Today's Diagnoses     Morbid obesity (H)    -  1      Care Instructions    Weigh yourself weekly and call or mychart me with your weight once monthly.  Let's meet in 3 months.  Start lorcaserin 20 mg once daily.  Check with your insurance to see if you can have a health .  Consider seeing a psychologist if you are struggling more with habit-eating.          Follow-ups after your visit        Follow-up notes from your care team     Return in about 3 months (around 3/3/2019) for Follow Up Visit.      Who to contact     If you have questions or need follow up information about today's clinic visit or your schedule please contact [facility] directly at [phone].  Normal or non-critical lab and imaging results will be communicated to you by MyChart, letter or phone within 4 business days after the clinic has received the results. If you do not hear from us within 7 days, please contact the clinic through [email or web address] or phone. If you have a critical or abnormal lab result, we will notify you by phone as soon as possible.  Submit refill requests through [employer] or call your pharmacy and they will forward the refill request to us. Please allow 3 business days for your refill to be completed.          Additional Information About Your Visit        Care EveryWhere ID     This is your Care EveryWhere ID. This could be used by other organizations to access your [facility] medical records  [id number]        Your Vitals Were     Pulse Temperature Height Last Period Pulse Oximetry BMI (Body Mass Index)    80 97.4  F (36.3  C) (Oral) 5' 7\" (1.702 m) 07/09/2007 98% 50.06 kg/m2       Blood Pressure from Last 3 Encounters:   12/03/18 112/70 "   11/05/18 118/70   10/03/18 124/74    Weight from Last 3 Encounters:   12/03/18 319 lb 9.6 oz (145 kg)   11/05/18 320 lb 11.2 oz (145.5 kg)   10/03/18 326 lb (147.9 kg)              Today, you had the following     No orders found for display         Today's Medication Changes          These changes are accurate as of 12/3/18  8:20 AM.  If you have any questions, ask your nurse or doctor.               Start taking these medicines.        Dose/Directions    Lorcaserin HCl ER 20 MG Tb24   Used for:  Morbid obesity (H)   Started by:  Ayla Graf MD        Dose:  20 mg   Take 20 mg by mouth daily   Quantity:  30 tablet   Refills:  2            Where to get your medicines      Some of these will need a paper prescription and others can be bought over the counter.  Ask your nurse if you have questions.     Bring a paper prescription for each of these medications     Lorcaserin HCl ER 20 MG Tb24                Primary Care Provider Office Phone # Fax #    Ayla Graf [phone]       [facility] [address] [place] [address]        Equal Access to Services     [facility] AH: Hadii dulce rojas Somojgan, waaxda luqadaha, qaybta kaalmada stew, tutu martin . So [facility] [phone].    ATENCIÓN: Si habla español, tiene a quintana disposición servicios gratuitos de asistencia lingüística. [facility] [phone].    We comply with applicable federal civil rights laws and [place] laws. We do not discriminate on the basis of race, color, national origin, age, disability, sex, sexual orientation, or gender identity.            Thank you!     Thank you for choosing [facility]  for your care. Our goal is always to provide you with excellent care. Hearing back from our patients is one way we can continue to improve our services. Please take a few minutes to complete the written survey that you may receive in the mail after your visit with us. Thank you!              Your Updated Medication List - Protect others around you: Learn how to safely use, store and throw away your medicines at www.disposemymeds.org.          This list is accurate as of 12/3/18  8:20 AM.  Always use your most recent med list.                   Brand Name Dispense Instructions for use Diagnosis    albuterol 108 (90 Base) MCG/ACT inhaler    PROAIR HFA/PROVENTIL HFA/VENTOLIN HFA    1 Inhaler    Inhale 2 puffs into the lungs every 4 hours as needed for shortness of breath / dyspnea or wheezing    Rhonchi, Acute bronchitis with symptoms > 10 days       hydrochlorothiazide 25 MG tablet    HYDRODIURIL    90 tablet    Take 1 tablet (25 mg) by mouth daily    Essential hypertension, Hyperlipidemia LDL goal <130       ibuprofen 200 MG tablet    ADVIL/MOTRIN     Take 400 mg by mouth every 12 hours as needed for mild pain        Lorcaserin HCl ER 20 MG Tb24     30 tablet    Take 20 mg by mouth daily    Morbid obesity (H)       Multi-vitamin tablet     100 tablet    Take 1 tablet by mouth daily        phentermine 15 MG capsule    ADIPEX-P    30 capsule    Take 1 capsule (15 mg) by mouth every morning    Morbid obesity (H)       ranitidine 150 MG tablet    ZANTAC     Take 150 mg by mouth as needed for heartburn        rosuvastatin 5 MG tablet    CRESTOR    90 tablet    Take 1 tablet (5 mg) by mouth daily    Hyperlipidemia LDL goal <130, Essential hypertension

## 2018-12-03 NOTE — PATIENT INSTRUCTIONS
Weigh yourself weekly and call or mychart me with your weight once monthly.  Let's meet in 3 months.  Start lorcaserin 20 mg once daily.  Check with your insurance to see if you can have a health .  Consider seeing a psychologist if you are struggling more with habit-eating.

## 2019-03-20 ENCOUNTER — OFFICE VISIT (OUTPATIENT)
Dept: PEDIATRICS | Facility: CLINIC | Age: 59
End: 2019-03-20
Payer: COMMERCIAL

## 2019-03-20 VITALS
SYSTOLIC BLOOD PRESSURE: 126 MMHG | HEIGHT: 67 IN | HEART RATE: 76 BPM | OXYGEN SATURATION: 96 % | WEIGHT: 293 LBS | TEMPERATURE: 98.6 F | BODY MASS INDEX: 45.99 KG/M2 | DIASTOLIC BLOOD PRESSURE: 68 MMHG

## 2019-03-20 DIAGNOSIS — E66.01 MORBID OBESITY (H): ICD-10-CM

## 2019-03-20 DIAGNOSIS — E78.5 HYPERLIPIDEMIA LDL GOAL <130: ICD-10-CM

## 2019-03-20 DIAGNOSIS — I10 ESSENTIAL HYPERTENSION: ICD-10-CM

## 2019-03-20 PROCEDURE — 99213 OFFICE O/P EST LOW 20 MIN: CPT | Performed by: INTERNAL MEDICINE

## 2019-03-20 RX ORDER — ROSUVASTATIN CALCIUM 5 MG/1
5 TABLET, COATED ORAL DAILY
Qty: 90 TABLET | Refills: 1 | Status: SHIPPED | OUTPATIENT
Start: 2019-03-20 | End: 2019-10-07

## 2019-03-20 RX ORDER — HYDROCHLOROTHIAZIDE 25 MG/1
25 TABLET ORAL DAILY
Qty: 90 TABLET | Refills: 1 | Status: SHIPPED | OUTPATIENT
Start: 2019-03-20 | End: 2019-10-07

## 2019-03-20 ASSESSMENT — MIFFLIN-ST. JEOR: SCORE: 2083.18

## 2019-03-20 NOTE — PROGRESS NOTES
"  SUBJECTIVE:   Katharina Mejias is a 58 year old female who presents to clinic today for the following health issues:      Medication Followup of lorcaserin    Taking Medication as prescribed: yes    Side Effects:  Maybe constipation    Medication Helping Symptoms:  NO-does not curb appetite     Feels very hungry. Having a difficult time turning down junk food people bring in to work since is their busy season. Will get better in May. Would like to discuss other options.     States her daughter is diabetic and is on ozempic, but it hasn't been helping with weight loss.    Problem list and histories reviewed & adjusted, as indicated.  Additional history: as documented    Reviewed and updated as needed this visit by clinical staff  Tobacco  Allergies  Meds  Med Hx  Surg Hx  Fam Hx  Soc Hx      Reviewed and updated as needed this visit by Provider           OBJECTIVE:     /68 (Cuff Size: Adult Large)   Pulse 76   Temp 98.6  F (37  C)   Ht 1.702 m (5' 7\")   Wt 147.6 kg (325 lb 4.8 oz)   LMP 07/09/2007   SpO2 96%   BMI 50.95 kg/m    Body mass index is 50.95 kg/m .  GENERAL: healthy, alert and no distress    Diagnostic Test Results:  none     ASSESSMENT/PLAN:     1. Hyperlipidemia LDL goal <130  refilled  - rosuvastatin (CRESTOR) 5 MG tablet; Take 1 tablet (5 mg) by mouth daily  Dispense: 90 tablet; Refill: 1  - hydrochlorothiazide (HYDRODIURIL) 25 MG tablet; Take 1 tablet (25 mg) by mouth daily  Dispense: 90 tablet; Refill: 1    2. Essential hypertension  Good control  - rosuvastatin (CRESTOR) 5 MG tablet; Take 1 tablet (5 mg) by mouth daily  Dispense: 90 tablet; Refill: 1  - hydrochlorothiazide (HYDRODIURIL) 25 MG tablet; Take 1 tablet (25 mg) by mouth daily  Dispense: 90 tablet; Refill: 1    3. Morbid obesity (H)  Discussed options. Could try trulicity/ozempic or orlistat. I do think, and she agrees, that she needs a structured program to help her with her food choices, since she has a lot of " distractions. Discussed reasonable options for this. I have recommended she take some time and look into these options and pick one. She is still planning on taking her big trip in June 2021 and would like to keep moving towards her weight loss goals so she can enjoy that trip more.      See Patient Instructions    I spent 20 minutes with this patient face-to-face, of which 50% or greater was spent in counseling and coordination of care with regards to obesity. Please see plan above.    Ayla Graf MD  [facility]

## 2019-03-20 NOTE — PATIENT INSTRUCTIONS
We can think about trulicity or ozempic once weekly injections. Just let me know if you would like to start this.  I am in favor of the Weight Watchers or other such programs if there is not a requirement that you buy supplements or meals, etc.     Look at the [place] 24 week healthy lifestyle program.

## 2019-10-07 ENCOUNTER — OFFICE VISIT (OUTPATIENT)
Dept: PEDIATRICS | Facility: CLINIC | Age: 59
End: 2019-10-07
Payer: COMMERCIAL

## 2019-10-07 VITALS
DIASTOLIC BLOOD PRESSURE: 80 MMHG | OXYGEN SATURATION: 98 % | SYSTOLIC BLOOD PRESSURE: 130 MMHG | HEART RATE: 91 BPM | BODY MASS INDEX: 45.99 KG/M2 | WEIGHT: 293 LBS | HEIGHT: 67 IN | TEMPERATURE: 98.3 F

## 2019-10-07 DIAGNOSIS — Z87.898 HISTORY OF WHEEZING: ICD-10-CM

## 2019-10-07 DIAGNOSIS — Z23 NEED FOR PROPHYLACTIC VACCINATION AND INOCULATION AGAINST INFLUENZA: ICD-10-CM

## 2019-10-07 DIAGNOSIS — E66.01 MORBID OBESITY (H): Primary | ICD-10-CM

## 2019-10-07 DIAGNOSIS — R60.9 EDEMA, UNSPECIFIED TYPE: ICD-10-CM

## 2019-10-07 DIAGNOSIS — R73.01 ELEVATED FASTING GLUCOSE: ICD-10-CM

## 2019-10-07 DIAGNOSIS — I10 ESSENTIAL HYPERTENSION: ICD-10-CM

## 2019-10-07 DIAGNOSIS — E78.5 HYPERLIPIDEMIA LDL GOAL <130: ICD-10-CM

## 2019-10-07 LAB — HBA1C MFR BLD: 5.8 % (ref 0–5.6)

## 2019-10-07 PROCEDURE — 83036 HEMOGLOBIN GLYCOSYLATED A1C: CPT | Performed by: INTERNAL MEDICINE

## 2019-10-07 PROCEDURE — 90682 RIV4 VACC RECOMBINANT DNA IM: CPT | Performed by: INTERNAL MEDICINE

## 2019-10-07 PROCEDURE — 80053 COMPREHEN METABOLIC PANEL: CPT | Performed by: INTERNAL MEDICINE

## 2019-10-07 PROCEDURE — 36415 COLL VENOUS BLD VENIPUNCTURE: CPT | Performed by: INTERNAL MEDICINE

## 2019-10-07 PROCEDURE — 99214 OFFICE O/P EST MOD 30 MIN: CPT | Mod: 25 | Performed by: INTERNAL MEDICINE

## 2019-10-07 PROCEDURE — 90471 IMMUNIZATION ADMIN: CPT | Performed by: INTERNAL MEDICINE

## 2019-10-07 PROCEDURE — 80061 LIPID PANEL: CPT | Performed by: INTERNAL MEDICINE

## 2019-10-07 RX ORDER — ALBUTEROL SULFATE 90 UG/1
2 AEROSOL, METERED RESPIRATORY (INHALATION) EVERY 4 HOURS PRN
Qty: 1 INHALER | Refills: 1 | Status: SHIPPED | OUTPATIENT
Start: 2019-10-07 | End: 2022-01-01

## 2019-10-07 RX ORDER — ROSUVASTATIN CALCIUM 5 MG/1
5 TABLET, COATED ORAL DAILY
Qty: 90 TABLET | Refills: 3 | Status: SHIPPED | OUTPATIENT
Start: 2019-10-07 | End: 2020-05-18

## 2019-10-07 RX ORDER — PHENTERMINE HYDROCHLORIDE 15 MG/1
15 CAPSULE ORAL EVERY MORNING
Qty: 30 CAPSULE | Refills: 0 | Status: SHIPPED | OUTPATIENT
Start: 2019-10-07 | End: 2019-11-11

## 2019-10-07 RX ORDER — HYDROCHLOROTHIAZIDE 25 MG/1
25 TABLET ORAL DAILY
Qty: 90 TABLET | Refills: 3 | Status: SHIPPED | OUTPATIENT
Start: 2019-10-07 | End: 2020-05-18

## 2019-10-07 RX ORDER — ALBUTEROL SULFATE 90 UG/1
2 AEROSOL, METERED RESPIRATORY (INHALATION) EVERY 4 HOURS PRN
Qty: 1 INHALER | Refills: 1 | Status: SHIPPED | OUTPATIENT
Start: 2019-10-07 | End: 2019-10-07

## 2019-10-07 ASSESSMENT — MIFFLIN-ST. JEOR: SCORE: 2153.02

## 2019-10-07 NOTE — PROGRESS NOTES
"Subjective     Katharina Mejias is a 59 year old female who presents to clinic today for the following health issues:    HPI   Hypertension Follow-up      Do you check your blood pressure regularly outside of the clinic? No     Are you following a low salt diet? Yes    Are your blood pressures ever more than 140 on the top number (systolic) OR more   than 90 on the bottom number (diastolic), for example 140/90? n/a      How many servings of fruits and vegetables do you eat daily?  0-1    On average, how many sweetened beverages do you drink each day (soda, juice, sweet tea, etc)?   0    How many days per week do you miss taking your medication? 0    Planning on going on trip to , [place], and [place] in June. Has put all of her weight she lost last year back on, plus more. Would like to restart phentermine, which did help her. She thinks this did work for her. She is feeling well.    Does not feel she is able to think about quitting smoking at this time.    Coming into busy season at work for her. Works 40-50+ hours per week and feels she has little time for exercise or additional appointments.     BP Readings from Last 3 Encounters:   10/07/19 130/80   03/20/19 126/68   12/03/18 112/70    Wt Readings from Last 3 Encounters:   10/07/19 (!) 155 kg (341 lb 12.8 oz)   03/20/19 147.6 kg (325 lb 4.8 oz)   12/03/18 145 kg (319 lb 9.6 oz)                 Reviewed and updated as needed this visit by Provider         Review of Systems   ROS COMP: Constitutional, HEENT, cardiovascular, pulmonary, gi and gu systems are negative, except as otherwise noted.      Objective    /80 (Cuff Size: Adult Large)   Pulse 91   Temp 98.3  F (36.8  C) (Tympanic)   Ht 1.702 m (5' 7\")   Wt (!) 155 kg (341 lb 12.8 oz)   LMP 07/09/2007   SpO2 98%   BMI 53.53 kg/m    Body mass index is 53.53 kg/m .  Physical Exam   GENERAL: healthy, alert and no distress  NECK: no adenopathy, no asymmetry, masses, or scars and thyroid normal to " palpation  RESP: lungs clear to auscultation - no rales, rhonchi or wheezes  CV: regular rate and rhythm, normal S1 S2, no S3 or S4, no murmur, click or rub, no peripheral edema and peripheral pulses strong  ABDOMEN: soft, nontender, no hepatosplenomegaly, no masses and bowel sounds normal  MS: no gross musculoskeletal defects noted, bilateral LE with significant lymphedema, minimal pitting.    Diagnostic Test Results:  Labs reviewed in Epic        Assessment & Plan     1. Morbid obesity (H)  Discussed options. Didn't feel lorcaserin worked, but did have weight loss on phentermine, requesting to start this again. Discussed that it is very important that we have a longer-term plan, since once she goes off phentermine again, it's likely she will gain the weight back again if she doesn't have a different plan. She is agreeable to go to weight management center, but is concerned about time for appointments, etc. Discussed risks of phentermine, hypertension, heart rhythm problems and cardiac events. She is aware of risks and prefers to proceed with this medication, which she tolerated well in the past. Can follow up with me or weight management in 1 month.  - BARIATRIC ADULT REFERRAL  - phentermine (ADIPEX-P) 15 MG capsule; Take 1 capsule (15 mg) by mouth every morning  Dispense: 30 capsule; Refill: 0    2. Essential hypertension  Good control at this time.  - hydrochlorothiazide (HYDRODIURIL) 25 MG tablet; Take 1 tablet (25 mg) by mouth daily  Dispense: 90 tablet; Refill: 3  - rosuvastatin (CRESTOR) 5 MG tablet; Take 1 tablet (5 mg) by mouth daily  Dispense: 90 tablet; Refill: 3    3. Edema, unspecified type  Long-term lymphedema. Discussed compression socks now and get new pairs prior to trip.  - order for DME; Equipment being ordered: Jobst or similar support stockings, low pressure 15-20 mmHg or per patient preference. Knee high.  Dispense: 4 each; Refill: 11    4. Elevated fasting glucose  Recheck.   - Hemoglobin  "A1c    5. Hyperlipidemia LDL goal <130  Tolerating statin.  - hydrochlorothiazide (HYDRODIURIL) 25 MG tablet; Take 1 tablet (25 mg) by mouth daily  Dispense: 90 tablet; Refill: 3  - rosuvastatin (CRESTOR) 5 MG tablet; Take 1 tablet (5 mg) by mouth daily  Dispense: 90 tablet; Refill: 3  - Lipid panel reflex to direct LDL Non-fasting  - Comprehensive metabolic panel (BMP + Alb, Alk Phos, ALT, AST, Total. Bili, TP)    6. Need for prophylactic vaccination and inoculation against influenza    - INFLUENZA QUAD, RECOMBINANT, P-FREE (RIV4) (FLUBLOCK) [37527]     Tobacco Cessation:   reports that she has been smoking. She has never used smokeless tobacco.  Tobacco Cessation Action Plan: Information offered: Patient not interested at this time      BMI:   Estimated body mass index is 53.53 kg/m  as calculated from the following:    Height as of this encounter: 1.702 m (5' 7\").    Weight as of this encounter: 155 kg (341 lb 12.8 oz).   Weight management plan: Patient referred to endocrine and/or weight management specialty        Patient Instructions   Let's restart phentermine.  Follow up in 1 month with me or weight management.    Try to walk 20-30 minutes every day.    Could take ranitidine 300 mg tabs, or switch to famotidine 20 mg twice daily.      Return in about 4 weeks (around 11/4/2019) for Follow Up Visit.    Ayla Graf MD  [facility]      "

## 2019-10-07 NOTE — PATIENT INSTRUCTIONS
Let's restart phentermine.  Follow up in 1 month with me or weight management.    Try to walk 20-30 minutes every day.    Could take ranitidine 300 mg tabs, or switch to famotidine 20 mg twice daily.

## 2019-10-08 LAB
ALBUMIN SERPL-MCNC: 3.6 G/DL (ref 3.4–5)
ALP SERPL-CCNC: 95 U/L (ref 40–150)
ALT SERPL W P-5'-P-CCNC: 30 U/L (ref 0–50)
ANION GAP SERPL CALCULATED.3IONS-SCNC: 5 MMOL/L (ref 3–14)
AST SERPL W P-5'-P-CCNC: 21 U/L (ref 0–45)
BILIRUB SERPL-MCNC: 0.4 MG/DL (ref 0.2–1.3)
BUN SERPL-MCNC: 16 MG/DL (ref 7–30)
CALCIUM SERPL-MCNC: 9 MG/DL (ref 8.5–10.1)
CHLORIDE SERPL-SCNC: 103 MMOL/L (ref 94–109)
CHOLEST SERPL-MCNC: 151 MG/DL
CO2 SERPL-SCNC: 30 MMOL/L (ref 20–32)
CREAT SERPL-MCNC: 0.72 MG/DL (ref 0.52–1.04)
GFR SERPL CREATININE-BSD FRML MDRD: >90 ML/MIN/{1.73_M2}
GLUCOSE SERPL-MCNC: 97 MG/DL (ref 70–99)
HDLC SERPL-MCNC: 37 MG/DL
LDLC SERPL CALC-MCNC: 75 MG/DL
NONHDLC SERPL-MCNC: 114 MG/DL
POTASSIUM SERPL-SCNC: 3.3 MMOL/L (ref 3.4–5.3)
PROT SERPL-MCNC: 7.2 G/DL (ref 6.8–8.8)
SODIUM SERPL-SCNC: 138 MMOL/L (ref 133–144)
TRIGL SERPL-MCNC: 196 MG/DL

## 2019-12-23 ENCOUNTER — ALLIED HEALTH/NURSE VISIT (OUTPATIENT)
Dept: NURSING | Facility: CLINIC | Age: 59
End: 2019-12-23
Payer: COMMERCIAL

## 2019-12-23 VITALS — WEIGHT: 293 LBS | BODY MASS INDEX: 51.12 KG/M2 | DIASTOLIC BLOOD PRESSURE: 72 MMHG | SYSTOLIC BLOOD PRESSURE: 138 MMHG

## 2019-12-23 DIAGNOSIS — E66.01 MORBID OBESITY (H): ICD-10-CM

## 2019-12-23 PROCEDURE — 99207 ZZC NO CHARGE NURSE ONLY: CPT

## 2019-12-23 RX ORDER — PHENTERMINE HYDROCHLORIDE 15 MG/1
CAPSULE ORAL
Qty: 30 CAPSULE | Refills: 0 | Status: CANCELLED | OUTPATIENT
Start: 2019-12-23

## 2019-12-23 NOTE — TELEPHONE ENCOUNTER
Dr. Graf patient would like a refill on her prescription phentermine (ADIPEX-P) 15 MG capsule. Had nurse only today 12/23/19 for blood pressure check and weight check.     Wendie Dominguez MA

## 2019-12-23 NOTE — PROGRESS NOTES
Katharina Mejias is a 59 year old patient who comes in today for a Blood Pressure check.  Initial BP:  /72 (BP Location: Right arm, Patient Position: Chair, Cuff Size: Adult Large)   Wt 148.1 kg (326 lb 6.4 oz)   LMP 07/09/2007   BMI 51.12 kg/m       Data Unavailable  Disposition: results routed to provider      Patient arrived for weight check appointment    Wt Readings from Last 2 Encounters:   12/23/19 148.1 kg (326 lb 6.4 oz)   10/07/19 (!) 155 kg (341 lb 12.8 oz)     Wendie Graf patient would like a refill on her Phentermine 15 mg capsule.

## 2019-12-24 RX ORDER — PHENTERMINE HYDROCHLORIDE 15 MG/1
CAPSULE ORAL
Qty: 30 CAPSULE | Refills: 0 | Status: SHIPPED | OUTPATIENT
Start: 2019-12-24 | End: 2020-02-04

## 2019-12-24 NOTE — TELEPHONE ENCOUNTER
Reviewed bp. Mildly elevated. Weight coming down. Seeing me in 1 month. Will review then.  Ayla Graf M.D.

## 2020-02-03 DIAGNOSIS — E66.01 MORBID OBESITY (H): ICD-10-CM

## 2020-02-04 RX ORDER — PHENTERMINE HYDROCHLORIDE 15 MG/1
CAPSULE ORAL
Qty: 30 CAPSULE | Refills: 1 | Status: SHIPPED | OUTPATIENT
Start: 2020-02-04 | End: 2020-04-21

## 2020-02-04 NOTE — TELEPHONE ENCOUNTER
Phentermine 15 mg qam      Last Written Prescription Date:  12/24/19  Last Fill Quantity: 30,   # refills: 0  Last Office Visit: 10/7/19  Future Office visit:    Next 5 appointments (look out 90 days)    Apr 29, 2020  9:00 AM CDT  (Arrive by 8:40 AM)  Office Visit with Ayla Graf MD  [facility] ([facility]) [address] [place] [id number] [phone]           Routing refill request to provider for review/approval because:  Drug not on the [facility], [facility] or [facility] refill protocol or controlled substance    Emiliana, RN  Triage Nurse

## 2020-02-04 NOTE — TELEPHONE ENCOUNTER
rx sent. Last nurse visit bp slightly above goal. Weight down 15 lbs in 10 weeks. Excellent. Has upcoming appointment with me in April. Please call to let her know this was sent and see how she is doing. This is usually an especially busy time of year for her at work.   I would like her to come in this month for another nurse blood pressure check.  Ayla Graf M.D.

## 2020-02-05 NOTE — TELEPHONE ENCOUNTER
Pt return called, she is doing great and scheduled a nurse appt for BP ck.     Sandy Keller on 2/5/2020 at 11:56 AM

## 2020-02-18 ENCOUNTER — TELEPHONE (OUTPATIENT)
Dept: PEDIATRICS | Facility: CLINIC | Age: 60
End: 2020-02-18

## 2020-02-18 NOTE — TELEPHONE ENCOUNTER
Prior Authorization Retail Medication Request    Medication/Dose: phentermine (ADIPEX-P) 15 MG capsule  ICD code (if different than what is on RX):  Morbid obesity (H) [E66.01]   Previously Tried and Failed:  NONE  Rationale:  NONE    Insurance Name:  1280-MEDICA CHOICE  Insurance ID:  [id number]       Pharmacy Information (if different than what is on RX)  Name:  [facility]   Phone:  [phone]  Fax:[phone]      Mirna Velasquez CMA

## 2020-02-20 NOTE — TELEPHONE ENCOUNTER
PA Initiation    Medication: phentermine 15mg  Insurance Company: [employer] - Phone [phone] Fax [phone]  Pharmacy Filling the Rx: [id number] [place], [place] - [address] AT [facility]  Filling Pharmacy Phone: [phone]  Filling Pharmacy Fax: [phone]  Start Date: 2/20/2020

## 2020-02-20 NOTE — TELEPHONE ENCOUNTER
Prior Authorization Approval    Authorization Effective Date: 1/21/2020  Authorization Expiration Date: 2/19/2021  Medication: phentermine 15mg  Approved Dose/Quantity:   Reference #: [id number]   Insurance Company: [employer] - Phone [phone] Fax [phone]  Expected CoPay:       CoPay Card Available:      Foundation Assistance Needed:    Which Pharmacy is filling the prescription (Not needed for infusion/clinic administered): [id number] [address] AT [address]  Pharmacy Notified: Yes  Patient Notified: Yes, **Instructed pharmacy to notify patient when script is ready to /ship.**

## 2020-03-16 ENCOUNTER — ALLIED HEALTH/NURSE VISIT (OUTPATIENT)
Dept: NURSING | Facility: CLINIC | Age: 60
End: 2020-03-16
Payer: COMMERCIAL

## 2020-03-16 VITALS — SYSTOLIC BLOOD PRESSURE: 124 MMHG | OXYGEN SATURATION: 92 % | HEART RATE: 89 BPM | DIASTOLIC BLOOD PRESSURE: 64 MMHG

## 2020-03-16 DIAGNOSIS — I10 ESSENTIAL HYPERTENSION: Primary | ICD-10-CM

## 2020-03-16 PROCEDURE — 99207 ZZC NO CHARGE NURSE ONLY: CPT

## 2020-03-16 NOTE — PROGRESS NOTES
Katharina Mejias is a 59 year old patient who comes in today for a Blood Pressure check.  Initial BP:  /64 (BP Location: Right arm, Patient Position: Chair, Cuff Size: Adult Large)   Pulse 89   LMP 07/09/2007   SpO2 92%      Disposition: results routed to provider    BP Readings from Last 3 Encounters:   03/16/20 124/64   12/23/19 138/72   10/07/19 130/80         Wendie Dominguez MA

## 2020-04-17 DIAGNOSIS — E66.01 MORBID OBESITY (H): ICD-10-CM

## 2020-04-17 NOTE — TELEPHONE ENCOUNTER
Requested Prescriptions   Pending Prescriptions Disp Refills     phentermine (ADIPEX-P) 15 MG capsule [Pharmacy Med Name: PHENTERMINE HCL 15MG CAPSULES] 30 capsule      Sig: TAKE 1 CAPSULE(15 MG) BY MOUTH EVERY MORNING       There is no refill protocol information for this order        Last Written Prescription Date:  2/4/20  Last Fill Quantity: 30,  # refills: 1   Last office visit: 10/7/2019 with prescribing provider:  Ayla Graf MD    Future Office Visit:     Next 5 appointments (look out 90 days)    Apr 29, 2020  9:00 AM CDT  (Arrive by 8:40 AM)  Office Visit with Ayla Graf MD  [facility] ([facility] [address] [place] [id number] [phone]         Routing refill request to provider for review/approval because:  Drug not on the [facility] refill protocol

## 2020-04-21 RX ORDER — PHENTERMINE HYDROCHLORIDE 15 MG/1
CAPSULE ORAL
Qty: 30 CAPSULE | Refills: 0 | Status: SHIPPED | OUTPATIENT
Start: 2020-04-21 | End: 2020-05-18

## 2020-04-27 ENCOUNTER — VIRTUAL VISIT (OUTPATIENT)
Dept: FAMILY MEDICINE | Facility: OTHER | Age: 60
End: 2020-04-27

## 2020-04-27 NOTE — PROGRESS NOTES
"Date: 2020 14:38:59  Clinician: Diana Martin  Clinician NPI: [id number]  Patient: Katharina Mejias  Patient : 1960  Patient Address: [address], [place] [address]  Patient Phone: [phone]  Visit Protocol: URI  Patient Summary:  Katharina is a 60 year old ( : 1960 ) female who initiated a Visit for COVID-19 (Coronavirus) evaluation and screening. When asked the question \"Please sign me up to receive news, health information and promotions from [employer].\", Katharina responded \"No\".    Katharina states her symptoms started gradually 5-6 days ago.   Her symptoms consist of a sore throat, a cough, nasal congestion, malaise, ear pain, and wheezing. She is experiencing mild difficulty breathing with activities but can speak normally in full sentences.   Symptom details     Nasal secretions: The color of her mucus is clear.    Cough: Katharina coughs a few times an hour and her cough is not more bothersome at night. Phlegm comes into her throat when she coughs. She believes her cough is caused by post-nasal drip. The color of the phlegm is clear, yellow, and blood-tinged.     Sore throat: Katharina reports having mild throat pain (1-3 on a 10 point pain scale), does not have exudate on her tonsils, and can swallow liquids. She is not sure if the lymph nodes in her neck are enlarged. A rash has not appeared on the skin since the sore throat started.     Wheezing: Katharina has not ever been diagnosed with asthma. The wheezing does not interfere with her normal daily activities.     Katharina denies having facial pain or pressure, fever, myalgias, rhinitis, headache, chills, vomiting, nausea, teeth pain, ageusia, anosmia, and diarrhea. She also denies taking antibiotic medication for the symptoms, double sickening (worsening symptoms after initial improvement), and having recent facial or sinus surgery in the past 60 days.   Precipitating events  Within the past week, Katharina " has not been exposed to someone with strep throat. She has not recently been exposed to someone with influenza. Katharina has been in close contact with the following high risk individuals: people with asthma, heart disease or diabetes.   Pertinent COVID-19 (Coronavirus) information  Katharina has not traveled internationally or to the areas where COVID-19 (Coronavirus) is widespread, including cruise ship travel in the last 14 days before the start of her symptoms.   Katharina does not work or volunteer as healthcare worker or a  and does not work or volunteer in a healthcare facility.   She does not live with a healthcare worker.   Katharina has not had a close contact with a laboratory-confirmed COVID-19 patient within 14 days of symptom onset. She also has not had a close contact with a suspected COVID-19 patient within 14 days of symptom onset.   Pertinent medical history  Katharina does not get yeast infections when she takes antibiotics.   Katharina does not need a return to work/school note.   Weight: 319 lbs   Katharina smokes or uses smokeless tobacco.   Additional information as reported by the patient (free text): Wednesday afternoon I had a headache, took a nap and woke up with a temp of 100.6 F.  Thursday I slept most of the day, temp fluctuated between 99.1 and 100.2 F.  Friday was between normal and 99.4, The same Saturday and Sunday.  Today it has been close to normal all day, but I am taking one acetaminophen every 5 to 6 hours, as I feel the fever increasing.  Last night about 3am woke up all sweaty, fever had broken.  My blood oxygen level was down when I was in on March 18th, I thought allergies   Weight: 319 lbs  A synchronous phone visit was initiated by the provider for the following reason: Wheezing, shortness of breath    MEDICATIONS: Centrum Silver oral, famotidine oral, acetaminophen oral, phentermine oral, rosuvastatin oral, hydrochlorothiazide oral, ALLERGIES:  Penicillins  Clinician Response:  Dear Katharina,   Your symptoms show that you may have coronavirus (COVID-19). This illness can cause fever, cough and trouble breathing. Many people get a mild case and get better on their own. Some people can get very sick.   Will I be tested for COVID-19?  We would recommend you be tested for coronavirus. Here is how to get that scheduled:   Call [phone]. Tell them you were referred by [facility] to have a COVID-19 test. You will be scheduled at one of our [facility] testing locations (drive-up). Please have your [facility] visit information ready when you call including your visit ID number to verify you were referred.    How can I protect others in the meantime?  First, stay home and away from others (self-isolate) until:   You've had no fever---and no medicine that reduces fever---for 3 full days (72 hours). And...    Your other symptoms have gotten better. For example, your cough or breathing has improved. And...    At least 7 days have passed since your symptoms started.   During this time:   Don't go to work, school or anywhere else.     Stay away from others in your home. No hugging, kissing or shaking hands.    Don't let anyone visit.    Cover your mouth and nose with a mask, tissue or wash cloth to avoid spreading germs.    Wash your hands and face often. Use soap and water.   How can I take care of myself?  1.Take Tylenol (acetaminophen) for fever or pain. If you have liver or kidney problems, ask your family doctor if it's okay to take Tylenol.   Adults can take either:    650 mg (two 325 mg pills) every 4 to 6 hours, or...    1,000 mg (two 500 mg pills) every 8 hours as needed.     Note: Don't take more than 3,000 mg in one day.   For children, check the Tylenol bottle for the right dose. The dose is based on the child's age or weight.  2.If you have other health problems (like cancer, heart failure, an organ transplant or severe kidney disease): Call your specialty clinic  if you don't feel better in the next 2 days.  3.Know when to call 911: If your breathing is so bad that it keeps you from doing normal activities, call 911 or go to the emergency room. Tell them that you've been staying home and may have COVID-19.  4.Sign up for [employer]. We know it's scary to hear that you might have COVID-19. We want to track your symptoms to make sure you're okay over the next 2 weeks. Please look for an email from [employer]---this is a free, online program that we'll use to keep in touch. To sign up, follow the link in the email. Learn more at [email or web address].  Where can I get more information?  To learn more about COVID-19 and how to care for yourself at home, please visit the CDC website at https://www.cdc.gov/coronavirus/2019-ncov/about/steps-when-sick.html.  For more about your care at [facility], please visit [email or web address]     Diagnosis: Cough  Diagnosis ICD: R05  Triage Notes: I reviewed the patient's history, verified their identity, and explained the Visit process.    Cough, temp of 100.6 F last Wednesday, temp is between 97.7 and 98.7 today. Still having cough. Feeling short of breath with activities, breathing is a little more shallow than normal. Able to speak in complete sentences, no signs of respiratory distress. Wheezing not more than normal. Is having nasal congestion. Used albuterol inhaler with improvement in breathing.  Synchronous Triage: phone, status: completed, duration: 788 seconds

## 2020-04-28 ENCOUNTER — OFFICE VISIT (OUTPATIENT)
Dept: URGENT CARE | Facility: URGENT CARE | Age: 60
End: 2020-04-28
Payer: COMMERCIAL

## 2020-04-28 DIAGNOSIS — Z20.822 SUSPECTED COVID-19 VIRUS INFECTION: Primary | ICD-10-CM

## 2020-04-28 PROCEDURE — 87635 SARS-COV-2 COVID-19 AMP PRB: CPT | Mod: 90 | Performed by: FAMILY MEDICINE

## 2020-04-28 PROCEDURE — 99000 SPECIMEN HANDLING OFFICE-LAB: CPT | Performed by: FAMILY MEDICINE

## 2020-04-28 PROCEDURE — 99207 ZZC NO BILLABLE SERVICE THIS VISIT: CPT

## 2020-04-29 LAB
SARS-COV-2 RNA SPEC QL NAA+PROBE: NOT DETECTED
SPECIMEN SOURCE: NORMAL

## 2020-05-18 ENCOUNTER — VIRTUAL VISIT (OUTPATIENT)
Dept: PEDIATRICS | Facility: CLINIC | Age: 60
End: 2020-05-18
Payer: COMMERCIAL

## 2020-05-18 DIAGNOSIS — I10 ESSENTIAL HYPERTENSION: ICD-10-CM

## 2020-05-18 DIAGNOSIS — G47.00 INSOMNIA, UNSPECIFIED TYPE: ICD-10-CM

## 2020-05-18 DIAGNOSIS — E78.5 HYPERLIPIDEMIA LDL GOAL <130: ICD-10-CM

## 2020-05-18 DIAGNOSIS — E66.01 MORBID OBESITY (H): Primary | ICD-10-CM

## 2020-05-18 PROCEDURE — 99215 OFFICE O/P EST HI 40 MIN: CPT | Mod: GT | Performed by: INTERNAL MEDICINE

## 2020-05-18 NOTE — PROGRESS NOTES
"Katharina Mejias is a 60 year old female who is being evaluated via a billable video visit.      The patient has been notified of following:     \"This video visit will be conducted via a call between you and your physician/provider. We have found that certain health care needs can be provided without the need for an in-person physical exam.  This service lets us provide the care you need with a video conversation.  If a prescription is necessary we can send it directly to your pharmacy.  If lab work is needed we can place an order for that and you can then stop by our lab to have the test done at a later time.    Video visits are billed at different rates depending on your insurance coverage.  Please reach out to your insurance provider with any questions.    If during the course of the call the physician/provider feels a video visit is not appropriate, you will not be charged for this service.\"    Patient has given verbal consent for Video visit? Yes    How would you like to obtain your AVS? Mail a copy    Patient would like the video invitation sent by: Text to cell phone: [phone]    Will anyone else be joining your video visit? No    Subjective     Katharina Mejias is a 60 year old female who presents today via video visit for the following health issues:    HPI  Hypertension Follow-up       Do you check your blood pressure regularly outside of the clinic? Yes  average 120/70's per ana maría on phone    Are you following a low salt diet? No  No added saltAre your blood pressures ever more than 140 on the top number (systolic) OR more   than 90 on the bottom number (diastolic), for example 140/90? No    Discuss sleep problems, wakes up at 0230 at least 5 x's per week and cant go back to sleep till 0500. Has a neighbor upstairs that is walking around. Falls asleep about 11 PM. No problems. Has been going on for 2 weeks.     Is taking phentermine daily. Has a light breakfast, sandwich for lunch. Sometimes goes to " "[employer] for lunch and has a burger. Is bored during the day and snacks more than she was before. Weighed herself at 326 in the last week.     Her cruise in June was cancelled and she got a voucher for another cruise. Booked August 2021.       How many servings of fruits and vegetables do you eat daily?  1-2    On average, how many sweetened beverages do you drink each day (Examples: soda, juice, sweet tea, etc.  Do NOT count diet or artificially sweetened beverages)?   0    How many days per week do you exercise enough to make your heart beat faster? 1-2    How many minutes a day do you exercise enough to make your heart beat faster? 10 - 19    How many days per week do you miss taking your medication? 0      Video Start Time: 2:00 PM    COVID-19 symptoms for 7 days. Then better. Fevers only lasted 2 days.     Working from home since 3/18/20.     Patient Active Problem List   Diagnosis     Ankylosing spondylitis (H)     CARDIOVASCULAR SCREENING; LDL GOAL LESS THAN 160     ACP (advance care planning)     Essential hypertension     Morbid obesity (H)     Hyperlipidemia LDL goal <130     Tobacco use disorder     Anxiety     Past Surgical History:   Procedure Laterality Date     NO HISTORY OF SURGERY         Social History     Tobacco Use     Smoking status: Current Every Day Smoker     Smokeless tobacco: Never Used   Substance Use Topics     Alcohol use: Yes     Comment: rarely     Family History   Problem Relation Age of Onset     Heart Disease Mother         d. age 70 CHF, thought due to very low K     Blood Disease Mother          \"borderline hemophilia\", diagnosed after bleeding ulcer, treated with vit K, then had blood clot     Prostate Cancer Father      Heart Disease Maternal Uncle      Diabetes Daughter          Current Outpatient Medications   Medication Sig Dispense Refill     albuterol (PROAIR HFA/PROVENTIL HFA/VENTOLIN HFA) 108 (90 Base) MCG/ACT inhaler Inhale 2 puffs into the lungs every 4 hours as " "needed for shortness of breath / dyspnea or wheezing 1 Inhaler 1     hydrochlorothiazide (HYDRODIURIL) 25 MG tablet Take 1 tablet (25 mg) by mouth daily Patient will call when needs filled. 90 tablet 1     ibuprofen (ADVIL/MOTRIN) 200 MG tablet Take 400 mg by mouth every 12 hours as needed for mild pain       multivitamin, therapeutic with minerals (MULTI-VITAMIN) TABS tablet Take 1 tablet by mouth daily 100 tablet 3     phentermine (ADIPEX-P) 15 MG capsule Take 1 capsule (15 mg) by mouth every morning 30 capsule 1     rosuvastatin (CRESTOR) 5 MG tablet Take 1 tablet (5 mg) by mouth daily Patient will call when needs filled. 90 tablet 1     order for DME Equipment being ordered: Jobst or similar support stockings, low pressure 15-20 mmHg or per patient preference. Knee high. 4 each 11       Reviewed and updated as needed this visit by Provider         Review of Systems   Constitutional, HEENT, cardiovascular, pulmonary, gi and gu systems are negative, except as otherwise noted.      Objective    LMP 07/09/2007   Estimated body mass index is 51.12 kg/m  as calculated from the following:    Height as of 10/7/19: 1.702 m (5' 7\").    Weight as of 12/23/19: 148.1 kg (326 lb 6.4 oz).  Physical Exam     GENERAL: Healthy, alert and no distress  EYES: Eyes grossly normal to inspection.  No discharge or erythema, or obvious scleral/conjunctival abnormalities.  RESP: No audible wheeze, cough, or visible cyanosis.  No visible retractions or increased work of breathing.    SKIN: Visible skin clear. No significant rash, abnormal pigmentation or lesions.  NEURO: Cranial nerves grossly intact.  Mentation and speech appropriate for age.  PSYCH: Mentation appears normal, affect normal/bright, judgement and insight intact, normal speech and appearance well-groomed.      Diagnostic Test Results:  Labs reviewed in Epic        Assessment & Plan     1. Morbid obesity (H)  Not doing as well at home with eating as she had been when working " "in her office. Weight essentially stable. Discussed would not continue to use phentermine if it is not helping her lose weight. She would like to continue it for now and work more on establishing new eating habits at home like pre-planning meals and not going to [employer] for lunch. Offered weight management referral. I think the dietician and therapy with this could be very helpful to her. She declines at this time.   - phentermine (ADIPEX-P) 15 MG capsule; Take 1 capsule (15 mg) by mouth every morning  Dispense: 30 capsule; Refill: 1    2. Hyperlipidemia LDL goal <130  Tolerating well  - rosuvastatin (CRESTOR) 5 MG tablet; Take 1 tablet (5 mg) by mouth daily Patient will call when needs filled.  Dispense: 90 tablet; Refill: 1  - hydrochlorothiazide (HYDRODIURIL) 25 MG tablet; Take 1 tablet (25 mg) by mouth daily Patient will call when needs filled.  Dispense: 90 tablet; Refill: 1    3. Essential hypertension  Reasonable control.  - rosuvastatin (CRESTOR) 5 MG tablet; Take 1 tablet (5 mg) by mouth daily Patient will call when needs filled.  Dispense: 90 tablet; Refill: 1  - hydrochlorothiazide (HYDRODIURIL) 25 MG tablet; Take 1 tablet (25 mg) by mouth daily Patient will call when needs filled.  Dispense: 90 tablet; Refill: 1     4. Insomnia, unspecified type  After discussion today, there is likely some behavioral change/schedue change that has happened due to current pandemic and working from home, discussed sleep hygiene changes that could be helpful to her. Mailing AVS. She will let me know if this doesn't improve.    BMI:   Estimated body mass index is 51.12 kg/m  as calculated from the following:    Height as of 10/7/19: 1.702 m (5' 7\").    Weight as of 12/23/19: 148.1 kg (326 lb 6.4 oz).   Weight management plan: Patient referred to endocrine and/or weight management specialty Discussed healthy diet and exercise guidelines        Patient Instructions   [facility]: [facility] [place] " "[phone] [email or web address]   Call for an appointment.      Patient Education   Tips for Sleep Hygiene  \"Sleep hygiene\" means having good sleep habits.Follow these tips to sleep better at night:     Get on a schedule. Go to bed and get up at about the same time every day.    Listen to your body. Only try to sleep when you actually feel tired or sleepy.    Be patient. If you haven't been able to get to sleep after about 30 minutes or more, get up and do something calming or boring until you feel sleepy. Then return to bed and try again.    Don't have caffeine (coffee, tea, cola drinks, chocolate and some medicines), alcohol or nicotine (cigarettes). These can make it harder for you to fall asleep and stay asleep.    Use your bed for sleeping only. That means no TV, computer or homework in bed, especially during the evening and before bedtime.    Don't nap during the day. If you must nap, make sure it is for less than 20 minutes.    Create sleep rituals that remind your body it is time to sleep. Examples include breathing exercises, stretching or reading a book.    Avoid all electronic media (smart phone, computer, tablet) within 2 hours of bed time. The \"blue light\" in these devices activates the part of the brain that keeps you awake.    Dim the lights at night.    Get early morning sources of light (walk in the sunshine) to help set sleep patterns at night.    Try a bath or shower before bed. Having a warm bath 1 to 2 hours before bedtime can help you feel sleepy. Hot baths can make you alert, so be mindful of the temperature.    Don't watch the clock. Checking the clock during the night can wake you up. It can also lead to negative thoughts such as, \"I will never fall asleep,\" which can increase anxiety and sleeplessness.    Use a sleep diary. Track your sleep schedule to know your sleep patterns and to see where you " can improve.    Get regular exercise every day. Try not to do heavy exercise in the 4 hours before bedtime.    Eat a healthy, balanced diet.    Try eating a light, healthy snack before bed, but avoid eating a heavy meal.    Create the right sleeping area. A cool, dark, quiet room is best. If needed, try earplugs, fans and blackout curtains.    Keep your daytime routine the same even if you have a bad night sleep. Avoiding activities the next day can make it harder to sleep.  For informational purposes only. Not to replace the advice of your health care provider.   Copyright   2013 [employer]. All rights reserved. [employer] 311612 - 01/16.           No follow-ups on file.    Ayla Graf MD  [facility]      Video-Visit Details    Type of service:  Video Visit    Video End Time:2:40 PM    Originating Location (pt. Location): Home    Distant Location (provider location):  [facility]     Platform used for Video Visit: [employer]    No follow-ups on file.       Ayla Graf MD

## 2020-05-18 NOTE — PATIENT INSTRUCTIONS
"[facility]: [facility] [place] [phone] [email or web address]   Call for an appointment.      Patient Education   Tips for Sleep Hygiene  \"Sleep hygiene\" means having good sleep habits.Follow these tips to sleep better at night:     Get on a schedule. Go to bed and get up at about the same time every day.    Listen to your body. Only try to sleep when you actually feel tired or sleepy.    Be patient. If you haven't been able to get to sleep after about 30 minutes or more, get up and do something calming or boring until you feel sleepy. Then return to bed and try again.    Don't have caffeine (coffee, tea, cola drinks, chocolate and some medicines), alcohol or nicotine (cigarettes). These can make it harder for you to fall asleep and stay asleep.    Use your bed for sleeping only. That means no TV, computer or homework in bed, especially during the evening and before bedtime.    Don't nap during the day. If you must nap, make sure it is for less than 20 minutes.    Create sleep rituals that remind your body it is time to sleep. Examples include breathing exercises, stretching or reading a book.    Avoid all electronic media (smart phone, computer, tablet) within 2 hours of bed time. The \"blue light\" in these devices activates the part of the brain that keeps you awake.    Dim the lights at night.    Get early morning sources of light (walk in the sunshine) to help set sleep patterns at night.    Try a bath or shower before bed. Having a warm bath 1 to 2 hours before bedtime can help you feel sleepy. Hot baths can make you alert, so be mindful of the temperature.    Don't watch the clock. Checking the clock during the night can wake you up. It can also lead to negative thoughts such as, \"I will never fall asleep,\" which can increase anxiety and sleeplessness.    Use a sleep diary. Track your sleep " schedule to know your sleep patterns and to see where you can improve.    Get regular exercise every day. Try not to do heavy exercise in the 4 hours before bedtime.    Eat a healthy, balanced diet.    Try eating a light, healthy snack before bed, but avoid eating a heavy meal.    Create the right sleeping area. A cool, dark, quiet room is best. If needed, try earplugs, fans and blackout curtains.    Keep your daytime routine the same even if you have a bad night sleep. Avoiding activities the next day can make it harder to sleep.  For informational purposes only. Not to replace the advice of your health care provider.   Copyright   2013 [facility]. All rights reserved. "[employer]" 171482 - 01/16.

## 2020-05-21 DIAGNOSIS — E66.01 MORBID OBESITY (H): ICD-10-CM

## 2020-05-23 RX ORDER — PHENTERMINE HYDROCHLORIDE 15 MG/1
CAPSULE ORAL
Qty: 30 CAPSULE | OUTPATIENT
Start: 2020-05-23

## 2020-05-23 RX ORDER — PHENTERMINE HYDROCHLORIDE 15 MG/1
15 CAPSULE ORAL EVERY MORNING
Qty: 30 CAPSULE | Refills: 1 | Status: SHIPPED | OUTPATIENT
Start: 2020-05-23 | End: 2020-09-09

## 2020-05-23 RX ORDER — ROSUVASTATIN CALCIUM 5 MG/1
5 TABLET, COATED ORAL DAILY
Qty: 90 TABLET | Refills: 1 | Status: SHIPPED | OUTPATIENT
Start: 2020-05-23 | End: 2020-11-11

## 2020-05-23 RX ORDER — HYDROCHLOROTHIAZIDE 25 MG/1
25 TABLET ORAL DAILY
Qty: 90 TABLET | Refills: 1 | Status: SHIPPED | OUTPATIENT
Start: 2020-05-23 | End: 2020-11-11

## 2020-07-03 DIAGNOSIS — Z11.59 SCREENING FOR VIRAL DISEASE: ICD-10-CM

## 2020-07-13 ENCOUNTER — VIRTUAL VISIT (OUTPATIENT)
Dept: PEDIATRICS | Facility: CLINIC | Age: 60
End: 2020-07-13
Payer: COMMERCIAL

## 2020-07-13 DIAGNOSIS — E66.01 MORBID OBESITY (H): Primary | ICD-10-CM

## 2020-07-13 DIAGNOSIS — E78.5 HYPERLIPIDEMIA LDL GOAL <130: ICD-10-CM

## 2020-07-13 DIAGNOSIS — I10 ESSENTIAL HYPERTENSION: ICD-10-CM

## 2020-07-13 DIAGNOSIS — Z12.31 ENCOUNTER FOR SCREENING MAMMOGRAM FOR BREAST CANCER: ICD-10-CM

## 2020-07-13 DIAGNOSIS — E87.6 HYPOKALEMIA: ICD-10-CM

## 2020-07-13 PROCEDURE — 99214 OFFICE O/P EST MOD 30 MIN: CPT | Mod: TEL | Performed by: INTERNAL MEDICINE

## 2020-07-13 RX ORDER — PHENTERMINE HYDROCHLORIDE 30 MG/1
30 CAPSULE ORAL EVERY MORNING
Qty: 30 CAPSULE | Refills: 0 | Status: SHIPPED | OUTPATIENT
Start: 2020-07-13 | End: 2020-09-09

## 2020-07-13 NOTE — PATIENT INSTRUCTIONS
Let's try to increase phentermine to 30 mg. We should follow up in 1 month.   I do recommend you talk with a nutritionist and therapist.    5 servings of fruit/veg daily.    Get some amount of physical activity every day.     Schedule your mammogram.    Important to do fasting lab before our follow up in 1 month.

## 2020-07-13 NOTE — PROGRESS NOTES
"Katharina Mejias is a 60 year old female who is being evaluated via a billable telephone visit.      The patient has been notified of following:     \"This video visit will be conducted via a call between you and your physician/provider. We have found that certain health care needs can be provided without the need for an in-person physical exam.  This service lets us provide the care you need with a video conversation.  If a prescription is necessary we can send it directly to your pharmacy.  If lab work is needed we can place an order for that and you can then stop by our lab to have the test done at a later time.    Video visits are billed at different rates depending on your insurance coverage.  Please reach out to your insurance provider with any questions.    If during the course of the call the physician/provider feels a video visit is not appropriate, you will not be charged for this service.\"    Patient has given verbal consent for Video visit? Yes  How would you like to obtain your AVS? Mail a copy  Patient would like the video invitation sent by: Text to cell phone: [phone]  Will anyone else be joining your video visit? No   120-130/70's    Subjective     Katharina Mejias is a 60 year old female who presents today via video visit for the following health issues:    Switched visit to phone due to technical difficulties with video.    HPI  Hypertension Follow-up      Do you check your blood pressure regularly outside of the clinic? Yes     Are you following a low salt diet? Yes-trying to    Are your blood pressures ever more than 140 on the top number (systolic) OR more   than 90 on the bottom number (diastolic), for example 140/90? No      How many servings of fruits and vegetables do you eat daily?  2-3    On average, how many sweetened beverages do you drink each day (Examples: soda, juice, sweet tea, etc.  Do NOT count diet or artificially sweetened beverages)?   0    How many days per week do " "you exercise enough to make your heart beat faster? 3 or less    How many minutes a day do you exercise enough to make your heart beat faster? 20 - 29    How many days per week do you miss taking your medication? 0      Will be working from home until mid-august. Considering asking to continue to work from home longer. Feels like she is adjusting to working from home. Is starting to like it more.     Weight in May was the same as in December. Has been working and weighed herself the other day and was 328. Up 2 lbs, but believes adamantly that without the phentermine she would be eating much more and gaining weight. Would very much like to continue.    Video Start Time: 12:21 PM    Medication Followup of Phentermine     Taking Medication as prescribed: yes    Side Effects:  None    Medication Helping Symptoms:  Yes    Needs Rx sent Pinstant Karma       Patient Active Problem List   Diagnosis     Ankylosing spondylitis (H)     CARDIOVASCULAR SCREENING; LDL GOAL LESS THAN 160     ACP (advance care planning)     Essential hypertension     Morbid obesity (H)     Hyperlipidemia LDL goal <130     Tobacco use disorder     Anxiety     Past Surgical History:   Procedure Laterality Date     NO HISTORY OF SURGERY         Social History     Tobacco Use     Smoking status: Current Every Day Smoker     Smokeless tobacco: Never Used   Substance Use Topics     Alcohol use: Yes     Comment: rarely     Family History   Problem Relation Age of Onset     Heart Disease Mother         d. age 70 CHF, thought due to very low K     Blood Disease Mother          \"borderline hemophilia\", diagnosed after bleeding ulcer, treated with vit K, then had blood clot     Prostate Cancer Father      Heart Disease Maternal Uncle      Diabetes Daughter          Current Outpatient Medications   Medication Sig Dispense Refill     albuterol (PROAIR HFA/PROVENTIL HFA/VENTOLIN HFA) 108 (90 Base) MCG/ACT inhaler Inhale 2 puffs into the lungs every 4 hours as needed " for shortness of breath / dyspnea or wheezing 1 Inhaler 1     hydrochlorothiazide (HYDRODIURIL) 25 MG tablet Take 1 tablet (25 mg) by mouth daily Patient will call when needs filled. 90 tablet 1     ibuprofen (ADVIL/MOTRIN) 200 MG tablet Take 400 mg by mouth every 12 hours as needed for mild pain       multivitamin, therapeutic with minerals (MULTI-VITAMIN) TABS tablet Take 1 tablet by mouth daily 100 tablet 3     order for DME Equipment being ordered: Jobst or similar support stockings, low pressure 15-20 mmHg or per patient preference. Knee high. 4 each 11     phentermine (ADIPEX-P) 15 MG capsule Take 1 capsule (15 mg) by mouth every morning 30 capsule 1     rosuvastatin (CRESTOR) 5 MG tablet Take 1 tablet (5 mg) by mouth daily Patient will call when needs filled. 90 tablet 1       Reviewed and updated as needed this visit by Provider         Review of Systems   Constitutional, HEENT, cardiovascular, pulmonary, gi and gu systems are negative, except as otherwise noted.      Objective             Physical Exam     GENERAL: Healthy, alert and no distress  RESP: No audible wheeze, cough.    NEURO:  Mentation and speech appropriate for age.  PSYCH: Mentation appears normal, affect normal/bright, judgement and insight intact, normal speech.          Diagnostic Test Results:  Labs reviewed in Epic        Assessment & Plan     1. Morbid obesity (H)  Up 2 lbs since last visit. Discussed options. I have highly recommended weight management clinic and seeing a nutritionist and therapist. She declines, but is aware these could be done virtually. Discussed other medication options, but given cost of others, she would like to try 30 mg dose of phentermine. Follow up in 1 month.   - phentermine (ADIPEX-P) 30 MG capsule; Take 1 capsule (30 mg) by mouth every morning  Dispense: 30 capsule; Refill: 0    2. Essential hypertension  Will need to monitor with increased dose phentermine.  - **Comprehensive metabolic panel FUTURE  anytime; Future    3. Hypokalemia  Noted slightly below normal 7 months ago. Due for recheck. She will have done within the next month    4. Hyperlipidemia LDL goal <130    - Lipid panel reflex to direct LDL Fasting; Future    5. Encounter for screening mammogram for breast cancer    - MA SCREENING DIGITAL BILAT - Future  (s+30); Future       Patient Instructions   Let's try to increase phentermine to 30 mg. We should follow up in 1 month.   I do recommend you talk with a nutritionist and therapist.    5 servings of fruit/veg daily.    Get some amount of physical activity every day.     Schedule your mammogram.    Important to do fasting lab before our follow up in 1 month.      No follow-ups on file.    Ayla Graf MD  [facility]      Phone visit total time:  25 minutes    Originating Location (pt. Location): Home    Distant Location (provider location):  [facility]     Platform used for Video Visit: Doximmarjorie    No follow-ups on file.       Ayla Graf MD

## 2020-07-22 ENCOUNTER — TELEPHONE (OUTPATIENT)
Dept: PEDIATRICS | Facility: CLINIC | Age: 60
End: 2020-07-22

## 2020-07-22 NOTE — TELEPHONE ENCOUNTER
Type of outreach:  Phone, spoke to patient.  and she refused to do mammo now dueto covid19  Health Maintenance Due   Topic Date Due     ANNUAL REVIEW OF HM ORDERS  1960     COLORECTAL CANCER SCREENING  04/02/1969     PNEUMOCOCCAL IMMUNIZATION 19-64 MEDIUM RISK (1 of 1 - PPSV23) 04/02/1978     ZOSTER IMMUNIZATION (1 of 2) 04/02/2009     PREVENTIVE CARE VISIT  10/02/2018     MAMMO SCREENING  10/02/2019     HPV TEST  10/02/2020     PAP  10/02/2020     Scheduled for appt.  Sally Bowers LPN

## 2020-08-05 DIAGNOSIS — Z11.59 SCREENING FOR VIRAL DISEASE: ICD-10-CM

## 2020-08-05 DIAGNOSIS — E78.5 HYPERLIPIDEMIA LDL GOAL <130: ICD-10-CM

## 2020-08-05 DIAGNOSIS — I10 ESSENTIAL HYPERTENSION: ICD-10-CM

## 2020-08-05 LAB
ALBUMIN SERPL-MCNC: 3.1 G/DL (ref 3.4–5)
ALP SERPL-CCNC: 101 U/L (ref 40–150)
ALT SERPL W P-5'-P-CCNC: 30 U/L (ref 0–50)
ANION GAP SERPL CALCULATED.3IONS-SCNC: 7 MMOL/L (ref 3–14)
AST SERPL W P-5'-P-CCNC: 17 U/L (ref 0–45)
BILIRUB SERPL-MCNC: 0.4 MG/DL (ref 0.2–1.3)
BUN SERPL-MCNC: 13 MG/DL (ref 7–30)
CALCIUM SERPL-MCNC: 9.1 MG/DL (ref 8.5–10.1)
CHLORIDE SERPL-SCNC: 105 MMOL/L (ref 94–109)
CHOLEST SERPL-MCNC: 140 MG/DL
CO2 SERPL-SCNC: 27 MMOL/L (ref 20–32)
CREAT SERPL-MCNC: 0.83 MG/DL (ref 0.52–1.04)
GFR SERPL CREATININE-BSD FRML MDRD: 76 ML/MIN/{1.73_M2}
GLUCOSE SERPL-MCNC: 117 MG/DL (ref 70–99)
HDLC SERPL-MCNC: 41 MG/DL
LDLC SERPL CALC-MCNC: 60 MG/DL
NONHDLC SERPL-MCNC: 99 MG/DL
POTASSIUM SERPL-SCNC: 3.4 MMOL/L (ref 3.4–5.3)
PROT SERPL-MCNC: 7.2 G/DL (ref 6.8–8.8)
SODIUM SERPL-SCNC: 139 MMOL/L (ref 133–144)
TRIGL SERPL-MCNC: 196 MG/DL

## 2020-08-05 PROCEDURE — 80053 COMPREHEN METABOLIC PANEL: CPT | Performed by: INTERNAL MEDICINE

## 2020-08-05 PROCEDURE — 80061 LIPID PANEL: CPT | Performed by: INTERNAL MEDICINE

## 2020-08-05 PROCEDURE — 36415 COLL VENOUS BLD VENIPUNCTURE: CPT | Performed by: INTERNAL MEDICINE

## 2020-08-05 PROCEDURE — 86769 SARS-COV-2 COVID-19 ANTIBODY: CPT | Performed by: INTERNAL MEDICINE

## 2020-08-06 LAB
COVID-19 ANTIBODY IGG: NEGATIVE
LAB TEST METHOD: NORMAL

## 2020-08-25 ENCOUNTER — TELEPHONE (OUTPATIENT)
Dept: PEDIATRICS | Facility: CLINIC | Age: 60
End: 2020-08-25

## 2020-09-09 ENCOUNTER — VIRTUAL VISIT (OUTPATIENT)
Dept: PEDIATRICS | Facility: CLINIC | Age: 60
End: 2020-09-09
Payer: COMMERCIAL

## 2020-09-09 DIAGNOSIS — F17.200 TOBACCO USE DISORDER: ICD-10-CM

## 2020-09-09 DIAGNOSIS — E78.5 HYPERLIPIDEMIA LDL GOAL <130: ICD-10-CM

## 2020-09-09 DIAGNOSIS — R73.01 ELEVATED FASTING GLUCOSE: ICD-10-CM

## 2020-09-09 DIAGNOSIS — E66.01 MORBID OBESITY (H): Primary | ICD-10-CM

## 2020-09-09 DIAGNOSIS — I10 ESSENTIAL HYPERTENSION: ICD-10-CM

## 2020-09-09 PROCEDURE — 99214 OFFICE O/P EST MOD 30 MIN: CPT | Mod: GT | Performed by: INTERNAL MEDICINE

## 2020-09-09 RX ORDER — PHENTERMINE HYDROCHLORIDE 30 MG/1
30 CAPSULE ORAL EVERY MORNING
Qty: 30 CAPSULE | Refills: 1 | Status: SHIPPED | OUTPATIENT
Start: 2020-09-09 | End: 2020-11-11

## 2020-09-09 RX ORDER — TOPIRAMATE 25 MG/1
TABLET, FILM COATED ORAL
Qty: 120 TABLET | Refills: 3 | Status: SHIPPED | OUTPATIENT
Start: 2020-09-09 | End: 2020-11-11

## 2020-09-09 NOTE — PATIENT INSTRUCTIONS
Check with your insurance about health coaching or therapy. Would want 12 sessions at least.    Congratulations on cutting back on smoking.    I'm sending phentermine for 2 months at 30 mg.   Let's add topamax for weight.    Video visit in 2 months.    Let's think about Ozempic at next visit. 0.25 or 0.5 mg weekly dose. We can go up to 1 mg for weight loss. If you'd like to start this, just let me know and I'll send a prescription.

## 2020-09-09 NOTE — PROGRESS NOTES
"Katharina Mejias is a 60 year old female who is being evaluated via a billable video visit.      The patient has been notified of following:     \"This video visit will be conducted via a call between you and your physician/provider. We have found that certain health care needs can be provided without the need for an in-person physical exam.  This service lets us provide the care you need with a video conversation.  If a prescription is necessary we can send it directly to your pharmacy.  If lab work is needed we can place an order for that and you can then stop by our lab to have the test done at a later time.    Video visits are billed at different rates depending on your insurance coverage.  Please reach out to your insurance provider with any questions.    If during the course of the call the physician/provider feels a video visit is not appropriate, you will not be charged for this service.\"    Patient has given verbal consent for Video visit? Yes  How would you like to obtain your AVS? Mail a copy  If you are dropped from the video visit, the video invite should be resent to: Text to cell phone: [phone]  Will anyone else be joining your video visit? No    Subjective     Katharina Mejias is a 60 year old female who presents today via video visit for the following health issues:    HPI    Medication Followup of phentermine, ran out about a month ago.      Taking Medication as prescribed: yes    Side Effects:  None    Medication Helping Symptoms:  Unsure, only had 30 days.       Video Start Time: 2:12 PM    Didn't think she felt any less hungry with increase in phentermine from 15 to 30 mg. Between 326 and 329 for 6 months. Up to 330. Now that she is off it, she is tired, but also trying to quit smoking. Still not really hungry when it's mealtime but gets \"the munchies\" and if she is bored, she reaches for something to eat. Could also be that she is reaching for a snack instead of a cigarette.    Blood " pressures on phentermine 123-128/73-75.    Planning on quitting smoking. Her apartment building went smoke-free. She didn't tolerate chantix and her daughter did poorly on wellbutrin so she isn't interested in any medication. Would like to quit cold turkey. Is worried she will gain weight with quitting smoking.    Would like to review her labs.     Review of Systems   Constitutional, HEENT, cardiovascular, pulmonary, gi and gu systems are negative, except as otherwise noted.      Objective           Vitals:  No vitals were obtained today due to virtual visit.    Physical Exam     GENERAL: Healthy, alert and no distress  EYES: Eyes grossly normal to inspection.  No discharge or erythema, or obvious scleral/conjunctival abnormalities.  RESP: No audible wheeze, cough, or visible cyanosis.  No visible retractions or increased work of breathing.    SKIN: Visible skin clear. No significant rash, abnormal pigmentation or lesions.  NEURO: Cranial nerves grossly intact.  Mentation and speech appropriate for age.  PSYCH: Mentation appears normal, affect normal/bright, judgement and insight intact, normal speech and appearance well-groomed.      Orders Only on 08/05/2020   Component Date Value Ref Range Status     Cholesterol 08/05/2020 140  <200 mg/dL Final     Triglycerides 08/05/2020 196* <150 mg/dL Final    Comment: Borderline high:  150-199 mg/dl  High:             200-499 mg/dl  Very high:       >499 mg/dl       HDL Cholesterol 08/05/2020 41* >49 mg/dL Final     LDL Cholesterol Calculated 08/05/2020 60  <100 mg/dL Final    Desirable:       <100 mg/dl     Non HDL Cholesterol 08/05/2020 99  <130 mg/dL Final     Sodium 08/05/2020 139  133 - 144 mmol/L Final     Potassium 08/05/2020 3.4  3.4 - 5.3 mmol/L Final     Chloride 08/05/2020 105  94 - 109 mmol/L Final     Carbon Dioxide 08/05/2020 27  20 - 32 mmol/L Final     Anion Gap 08/05/2020 7  3 - 14 mmol/L Final     Glucose 08/05/2020 117* 70 - 99 mg/dL Final     Urea  Nitrogen 08/05/2020 13  7 - 30 mg/dL Final     Creatinine 08/05/2020 0.83  0.52 - 1.04 mg/dL Final     GFR Estimate 08/05/2020 76  >60 mL/min/[1.73_m2] Final    Comment: Non  GFR Calc  Starting 12/18/2018, serum creatinine based estimated GFR (eGFR) will be   calculated using the Chronic Kidney Disease Epidemiology Collaboration   (CKD-EPI) equation.       GFR Estimate If Black 08/05/2020 88  >60 mL/min/[1.73_m2] Final    Comment:  GFR Calc  Starting 12/18/2018, serum creatinine based estimated GFR (eGFR) will be   calculated using the Chronic Kidney Disease Epidemiology Collaboration   (CKD-EPI) equation.       Calcium 08/05/2020 9.1  8.5 - 10.1 mg/dL Final     Bilirubin Total 08/05/2020 0.4  0.2 - 1.3 mg/dL Final     Albumin 08/05/2020 3.1* 3.4 - 5.0 g/dL Final     Protein Total 08/05/2020 7.2  6.8 - 8.8 g/dL Final     Alkaline Phosphatase 08/05/2020 101  40 - 150 U/L Final     ALT 08/05/2020 30  0 - 50 U/L Final     AST 08/05/2020 17  0 - 45 U/L Final     COVID-19 Antibody, IgG 08/05/2020 Negative  NEG^Negative Final    Comment: Negative results do not rule out SARS-CoV-2 infection, particularly in those   who have been in contact with the virus.  Follow-up testing with a molecular   diagnostic should be considered to rule out infection in these individuals.  Results from antibody testing should not be used as the sole basis to diagnose   or exclude SARS-CoV-2 infection or to inform infection status.       COVID-19 Antibody, IgG Comment 08/05/2020 See note   Final    Comment: This automated Chemiluminescent microparticle immunoassay (CMIA) by [employer] on   the  [id number] instrument has been given Emergency Use Authorization   (EUA).  The performance characteristics of this test were determined by the [facility].  The   laboratory is regulated under CLIA as qualified to perform high-complexity   testing.  This  test is used for clinical purposes.  It should not be regarded   as investigational or for research.               Assessment & Plan     1. Morbid obesity (H)  Didn't lose weight on phentermine 30 mg, but again feels with being at home all the time and in quarantine, it has helped her avoid weight gain. Discussed other options to help her. topamax vs ozempic. She would like to try topamax. If this doesn't help or she has GI side effects, she is willing to try Ozempic. Her daughter has done very well on this. Also discussed again option for weight management program, specifically health coaching, which I think could really help her change her behaviors and improve her health.  - topiramate (TOPAMAX) 25 MG tablet; 25 mg at bedtime for 7 days, then 25 mg bid for 7 days, then 25 mg in AM 50 mg in PM or 7 days, then 50 mg twice daily.  Dispense: 120 tablet; Refill: 3  - phentermine (ADIPEX-P) 30 MG capsule; Take 1 capsule (30 mg) by mouth every morning  Dispense: 30 capsule; Refill: 1    2. Essential hypertension  Checking her blood pressures at home. Tolerated adipex 30 mg. Will restart.    3. Hyperlipidemia LDL goal <130  Reviewed her results. On crestor. Consider increasing dose for TGL and HDL, but may also improve with healthier habits.     4. Elevated fasting glucose  Discussed metformin vs ozempic. Consider ozempic. Her daughter didn't tolerate metformin and they tend to respond the same to medications.    5. Tobacco use disorder  She is on her last pack of cigarettes and doesn't plan on buying any more. Her apartment building has gone smoke-free and this has pushed her forward and she is looking forward to quitting. Discussed health coaching may help with this as well. She thinks health coaching is a benefit of her insurance plan so she is going to look into this.       Tobacco Cessation:   reports that she has been smoking. She has never used smokeless tobacco.  Tobacco Cessation Action Plan: Information  offered: Patient not interested at this time        Patient Instructions   Check with your insurance about health coaching or therapy. Would want 12 sessions at least.    Congratulations on cutting back on smoking.    I'm sending phentermine for 2 months at 30 mg.   Let's add topamax for weight.    Video visit in 2 months.    Let's think about Ozempic at next visit. 0.25 or 0.5 mg weekly dose. We can go up to 1 mg for weight loss. If you'd like to start this, just let me know and I'll send a prescription.      No follow-ups on file.    Ayla Graf MD  [facility]      Video-Visit Details    Type of service:  Video Visit    Video End Time:3:01 PM    Originating Location (pt. Location): Home    Distant Location (provider location):  [facility]     Platform used for Video Visit: Rafiq

## 2020-10-07 ENCOUNTER — TELEPHONE (OUTPATIENT)
Dept: PEDIATRICS | Facility: CLINIC | Age: 60
End: 2020-10-07

## 2020-10-07 NOTE — TELEPHONE ENCOUNTER
Pt scheduled for follow up/med check for 11/11. States she will be out of Rx's before then. Advised to contact pharmacy when refills are needed.    Olga Lidia De La Torre on 10/7/2020 at 1:21 PM

## 2020-11-11 ENCOUNTER — VIRTUAL VISIT (OUTPATIENT)
Dept: PEDIATRICS | Facility: CLINIC | Age: 60
End: 2020-11-11
Payer: COMMERCIAL

## 2020-11-11 DIAGNOSIS — E78.5 HYPERLIPIDEMIA LDL GOAL <130: ICD-10-CM

## 2020-11-11 DIAGNOSIS — E66.01 MORBID OBESITY (H): Primary | ICD-10-CM

## 2020-11-11 DIAGNOSIS — M45.9 ANKYLOSING SPONDYLITIS, UNSPECIFIED SITE OF SPINE (H): ICD-10-CM

## 2020-11-11 DIAGNOSIS — I10 ESSENTIAL HYPERTENSION: ICD-10-CM

## 2020-11-11 PROCEDURE — 99214 OFFICE O/P EST MOD 30 MIN: CPT | Mod: TEL | Performed by: INTERNAL MEDICINE

## 2020-11-11 RX ORDER — TOPIRAMATE 50 MG/1
50 TABLET, FILM COATED ORAL 2 TIMES DAILY
Qty: 60 TABLET | Refills: 11 | Status: SHIPPED | OUTPATIENT
Start: 2020-11-11 | End: 2021-09-10

## 2020-11-11 RX ORDER — PHENTERMINE HYDROCHLORIDE 30 MG/1
30 CAPSULE ORAL EVERY MORNING
Qty: 30 CAPSULE | Refills: 1 | Status: SHIPPED | OUTPATIENT
Start: 2020-11-11 | End: 2021-01-13

## 2020-11-11 RX ORDER — ROSUVASTATIN CALCIUM 5 MG/1
5 TABLET, COATED ORAL DAILY
Qty: 90 TABLET | Refills: 1 | Status: SHIPPED | OUTPATIENT
Start: 2020-11-11 | End: 2021-01-27

## 2020-11-11 RX ORDER — LOSARTAN POTASSIUM 25 MG/1
25 TABLET ORAL DAILY
Qty: 30 TABLET | Refills: 2 | Status: SHIPPED | OUTPATIENT
Start: 2020-11-11 | End: 2021-02-12

## 2020-11-11 RX ORDER — HYDROCHLOROTHIAZIDE 25 MG/1
25 TABLET ORAL DAILY
Qty: 90 TABLET | Refills: 1 | Status: SHIPPED | OUTPATIENT
Start: 2020-11-11 | End: 2020-12-15 | Stop reason: SINTOL

## 2020-11-11 NOTE — PATIENT INSTRUCTIONS
Stop ibuprofen. Watch your blood pressure. If evening pressures still >130/80, Start losartan 25 mg daily for blood pressure.  Fasting lab in 1 month.     Switch topamax to 50 mg tab and just take 1 twice daily.    Ask about physical therapy and your coverage for this. You could do 1-12 visits, depending on what you want and what you recommend.     Consider cymbalta for chronic pain.  Or, you could see Dr. Maritza Gil at the pain clinic. She is great.    For questions about the cost of your visit, or the cost of any procedure, lab or imaging study, please contact our CONSUMER PRICE LINE at [phone] for an estimate.  You may also contact them at [email or web address]     You will be asked to provide your name, birthdate, address, phone number, and insurance information.  You will also need to know the name of any specific test or procedure which is planned.  This often requires the CPT (common procedural terminology) code for the test or procedure.  Our clinic staff can help you get that information, if needed.    For information about how your insurance will cover your clinic visit, please call the customer service number on your insurance card.    [id number]  Diagnosis ankylosing spondylitis.    Patient Education     Back Exercises: Abdominal Lift Brace with Marching    The abdominal lift brace with march strengthens your lower abdominal muscles, helping you keep your pelvis and back stable:    Lie on the floor with both knees bent. Put your feet flat on the floor and your arms by your sides. Tighten your abdominal muscles. Be sure to continue to breathe.    Lift one bent knee about 2 inches then return it to the floor and lift the other about 2 inches. Keep your abdominal muscles tight and continue to breathe. These motions should be slow and controlled without your pelvis rocking side to side.    Repeat 10 times.  [employer] last reviewed this educational content on 3/1/2018    8810-3988 [employer],  [employer]. [address] [place] [address]. All rights reserved. This information is not intended as a substitute for professional medical care. Always follow your healthcare professional's instructions.           Patient Education     Back Exercises: Arm Reach    Do this exercise on your hands and knees. Keep your knees under your hips and your hands under your shoulders. Keep your spine in a neutral position (not arched or sagging). Be sure to maintain your neck s natural curve:    Stretch one arm straight out in front of you. Don t raise your head or let your supporting shoulder sag.    Hold for 5 seconds.    Return to starting position.    Repeat 5 times.    Switch arms.  [employer] last reviewed this educational content on 3/1/2018    2269-5211 [employer]. [address] [place] [address]. All rights reserved. This information is not intended as a substitute for professional medical care. Always follow your healthcare professional's instructions.           Patient Education     Back Exercises: Back Release  Do this exercise on your hands and knees. Keep your knees under your hips and your hands under your shoulders.        Relax your abdominal and buttocks muscles, lift your head, and let your back sag. Be sure to keep your weight evenly distributed. Don t sit back on your hips.     Hold for 5 seconds.    Return to starting position.    Tuck your head and lift (arch) your back.    Hold for 5 seconds    Return to starting position.    Repeat 5 times.  [employer] last reviewed this educational content on 3/1/2018    8794-8891 [employer]. [address] [place] [address]. All rights reserved. This information is not intended as a substitute for professional medical care. Always follow your healthcare professional's instructions.           Patient Education     Back Exercises: Knee Lift         To start, lie on your back with your knees bent and feet flat on the  floor. Don t press your neck or lower back to the floor. Breathe deeply. You should feel comfortable and relaxed in this position:    Start by tightening your abdominal muscles.    Lift one bent knee off the floor 2 to 4 inches.    Hold for 10 seconds. Return to start position.    Repeat 3 times.    Switch legs.  [employer] last reviewed this educational content on 3/1/2018    5726-0495 [employer]. [address] [place] [address]. All rights reserved. This information is not intended as a substitute for professional medical care. Always follow your healthcare professional's instructions.           Patient Education     Back Exercises: Leg Pull    To start, lie on your back with your knees bent and feet flat on the floor. Don t press your neck or lower back to the floor. Breathe deeply. You should feel comfortable and relaxed in this position.    Pull one knee to your chest.    Hold for 30 to 60 seconds. Return to starting position.    Repeat 2 times.    Switch legs.    For a double leg pull, pull both legs to your chest at the same time. Repeat 2 times.  For your safety, check with your healthcare provider before starting an exercise program.    [employer] last reviewed this educational content on 3/1/2018    7920-4220 [employer]. [address] [place] [address]. All rights reserved. This information is not intended as a substitute for professional medical care. Always follow your healthcare professional's instructions.           Patient Education     Back Exercises: Leg Reach      Do this exercise on your hands and knees. Keep your knees under your hips and your hands under your shoulders. Keep your spine in a neutral position (not arched or sagging). Be sure to maintain your neck s natural curve:    Extend one leg straight back. Don t arch your back or let your head or body sag.    Hold for 5 seconds. Return to starting position.    Repeat 5 times.    Switch  legs.   [employer] last reviewed this educational content on 3/1/2018    5419-9824 [employer]. [address] [place] [address]. All rights reserved. This information is not intended as a substitute for professional medical care. Always follow your healthcare professional's instructions.           Patient Education     Back Exercises: Lower Back Rotation    To start, lie on your back with your knees bent and feet flat on the floor. Don t press your neck or lower back to the floor. Breathe deeply. You should feel comfortable and relaxed in this position.    Drop both knees to one side. Turn your head to the other side. Keep your shoulders flat on the floor.    Do not push through pain.    Hold for 20 seconds.    Slowly switch sides.    Repeat 2 to 5 times.  [employer] last reviewed this educational content on 3/1/2018    0709-6228 [employer]. [address] [place] [address]. All rights reserved. This information is not intended as a substitute for professional medical care. Always follow your healthcare professional's instructions.           Patient Education     Back Exercises: Lower Back Stretch    To start, sit in a chair with your feet flat on the floor. Shift your weight slightly forward. Relax, and keep your ears, shoulders, and hips aligned while you do the following:    Sit with your feet well apart.    Bend forward and touch the floor with the backs of your hands. Relax and let your body drop.    Hold for 20 seconds. Return to starting position.    Repeat 2 times.   [employer] last reviewed this educational content on 11/1/2017 2000-2020 [employer]. [address] [place] [address]. All rights reserved. This information is not intended as a substitute for professional medical care. Always follow your healthcare professional's instructions.           Patient Education     Back Exercises: Partial Curl-Ups    To start, lie on your back with  your knees bent and feet flat on the floor. Don t press your neck or lower back to the floor. Breathe deeply. You should feel comfortable and relaxed in this position:    Cross your arms loosely.    Tighten your abdomen and curl residential up, keeping your head in line with your shoulders.    Hold for 5 seconds. Uncurl to lie down.    Repeat 2 sets of 10.   [employer] last reviewed this educational content on 3/1/2018    9576-1437 [employer]. [address] [place] [address]. All rights reserved. This information is not intended as a substitute for professional medical care. Always follow your healthcare professional's instructions.           Patient Education     Lower Body Exercises: Quad Stretch    This exercise stretches  your lower body to help your back. As you work out, don t rush or strain. Stand arm s length from a wall. Place one hand on it.  Here are the other steps to the quad stretch:    With your other hand, grasp your ankle on the same side. Pull the heel toward your buttocks. Don t arch your back.    Hold for 30 to 60 seconds. Repeat 2 times. Switch legs.  For your safety, check with your healthcare provider before starting an exercise program.   [employer] last reviewed this educational content on 11/1/2017 2000-2020 The [employer]. [address] [place] [address]. All rights reserved. This information is not intended as a substitute for professional medical care. Always follow your healthcare professional's instructions.

## 2020-11-11 NOTE — PROGRESS NOTES
"Katharina Mejias is a 60 year old female who is being evaluated via a billable telephone visit.      The patient has been notified of following:     \"This telephone visit will be conducted via a call between you and your physician/provider. We have found that certain health care needs can be provided without the need for a physical exam.  This service lets us provide the care you need with a short phone conversation.  If a prescription is necessary we can send it directly to your pharmacy.  If lab work is needed we can place an order for that and you can then stop by our lab to have the test done at a later time.    Telephone visits are billed at different rates depending on your insurance coverage. During this emergency period, for some insurers they may be billed the same as an in-person visit.  Please reach out to your insurance provider with any questions.    If during the course of the call the physician/provider feels a telephone visit is not appropriate, you will not be charged for this service.\"    Patient has given verbal consent for Telephone visit?  Yes    What phone number would you like to be contacted at? [phone]     How would you like to obtain your AVS? Mail a copy    Subjective     Katharina Mejias is a 60 year old female who presents via phone visit today for the following health issues:    HPI     Diabetes Follow-up      How often are you checking your blood sugar? Not at all    What concerns do you have today about your diabetes? None     Do you have any of these symptoms? (Select all that apply)  Numbness in feet and Weight loss      Hyperlipidemia Follow-Up      Are you regularly taking any medication or supplement to lower your cholesterol?   Yes- crestor    Are you having muscle aches or other side effects that you think could be caused by your cholesterol lowering medication?  No    Hypertension Follow-up      Do you check your blood pressure regularly outside of the clinic? Yes " good in the am 110/60 pulse 64 in the pm 135-150/70-80 pulse 84    Are you following a low salt diet? Yes    Are your blood pressures ever more than 140 on the top number (systolic) OR more   than 90 on the bottom number (diastolic), for example 140/90? Yes weight down to 319.2 lbs    Takes 12 ibuprofen/day for back and hip pain. Saw rheumatology 12 years ago in [place], diagnosed with ankylosing spondylitis. Saw rheum here 2 years ago and thought was equivocal. Recommended MRI which she did not do due to cost concerns. Since then has continued to use ibuprofen daily for her pain. Pain worst on getting up out of bed or a chair. Hurts all day long. Worse in certain positions.     BP Readings from Last 2 Encounters:   03/16/20 124/64   12/23/19 138/72     Hemoglobin A1C (%)   Date Value   10/07/2019 5.8 (H)   06/26/2017 5.6     LDL Cholesterol Calculated (mg/dL)   Date Value   08/05/2020 60   10/07/2019 75         How many servings of fruits and vegetables do you eat daily?  2-3    On average, how many sweetened beverages do you drink each day (Examples: soda, juice, sweet tea, etc.  Do NOT count diet or artificially sweetened beverages)?   0    How many days per week do you exercise enough to make your heart beat faster? 3 or less    How many minutes a day do you exercise enough to make your heart beat faster? 20 - 29    How many days per week do you miss taking your medication? 0        Review of Systems   Constitutional, HEENT, cardiovascular, pulmonary, gi and gu systems are negative, except as otherwise noted.       Objective          Vitals:  No vitals were obtained today due to virtual visit.    healthy, alert and no distress  PSYCH: Alert and oriented times 3; coherent speech, normal   rate and volume, able to articulate logical thoughts, able   to abstract reason, no tangential thoughts, no hallucinations   or delusions  Her affect is normal  RESP: No cough, no audible wheezing, able to talk in full  sentences  Remainder of exam unable to be completed due to telephone visits    Orders Only on 08/05/2020   Component Date Value Ref Range Status     Cholesterol 08/05/2020 140  <200 mg/dL Final     Triglycerides 08/05/2020 196* <150 mg/dL Final    Comment: Borderline high:  150-199 mg/dl  High:             200-499 mg/dl  Very high:       >499 mg/dl       HDL Cholesterol 08/05/2020 41* >49 mg/dL Final     LDL Cholesterol Calculated 08/05/2020 60  <100 mg/dL Final    Desirable:       <100 mg/dl     Non HDL Cholesterol 08/05/2020 99  <130 mg/dL Final     Sodium 08/05/2020 139  133 - 144 mmol/L Final     Potassium 08/05/2020 3.4  3.4 - 5.3 mmol/L Final     Chloride 08/05/2020 105  94 - 109 mmol/L Final     Carbon Dioxide 08/05/2020 27  20 - 32 mmol/L Final     Anion Gap 08/05/2020 7  3 - 14 mmol/L Final     Glucose 08/05/2020 117* 70 - 99 mg/dL Final     Urea Nitrogen 08/05/2020 13  7 - 30 mg/dL Final     Creatinine 08/05/2020 0.83  0.52 - 1.04 mg/dL Final     GFR Estimate 08/05/2020 76  >60 mL/min/[1.73_m2] Final    Comment: Non  GFR Calc  Starting 12/18/2018, serum creatinine based estimated GFR (eGFR) will be   calculated using the Chronic Kidney Disease Epidemiology Collaboration   (CKD-EPI) equation.       GFR Estimate If Black 08/05/2020 88  >60 mL/min/[1.73_m2] Final    Comment:  GFR Calc  Starting 12/18/2018, serum creatinine based estimated GFR (eGFR) will be   calculated using the Chronic Kidney Disease Epidemiology Collaboration   (CKD-EPI) equation.       Calcium 08/05/2020 9.1  8.5 - 10.1 mg/dL Final     Bilirubin Total 08/05/2020 0.4  0.2 - 1.3 mg/dL Final     Albumin 08/05/2020 3.1* 3.4 - 5.0 g/dL Final     Protein Total 08/05/2020 7.2  6.8 - 8.8 g/dL Final     Alkaline Phosphatase 08/05/2020 101  40 - 150 U/L Final     ALT 08/05/2020 30  0 - 50 U/L Final     AST 08/05/2020 17  0 - 45 U/L Final     COVID-19 Antibody, IgG 08/05/2020 Negative  NEG^Negative Final    Comment:  Negative results do not rule out SARS-CoV-2 infection, particularly in those   who have been in contact with the virus.  Follow-up testing with a molecular   diagnostic should be considered to rule out infection in these individuals.  Results from antibody testing should not be used as the sole basis to diagnose   or exclude SARS-CoV-2 infection or to inform infection status.       COVID-19 Antibody, IgG Comment 08/05/2020 See note   Final    Comment: This automated Chemiluminescent microparticle immunoassay (CMIA) by [employer] on   the  i2000 instrument has been given Emergency Use Authorization   (EUA).  The performance characteristics of this test were determined by the [facility].  The   laboratory is regulated under CLIA as qualified to perform high-complexity   testing.  This test is used for clinical purposes.  It should not be regarded   as investigational or for research.               Assessment/Plan:    Assessment & Plan     Morbid obesity (H)  Weight down 11 lbs in 2 months with combination of phentermine and topamax. Will continue with this for now. She does think both are helping. If weight loss slows down, would consider trial off phentermine.   - phentermine (ADIPEX-P) 30 MG capsule; Take 1 capsule (30 mg) by mouth every morning  - topiramate (TOPAMAX) 50 MG tablet; Take 1 tablet (50 mg) by mouth 2 times daily  - **A1C FUTURE anytime; Future  - **Basic metabolic panel FUTURE anytime; Future    Essential hypertension  Higher pressures in evening at home. Discussed that phentermine could contribute to this, but has had improved weight loss with this in combination. For now will continue phentermine. Discussed that daily ibuprofen use could also increase her bp and may be the cause of her higher evening pressures after daytime doses. Will stop ibuprofen and watch bp, if still elevated in evenings after a few days, will start  losartan.  - hydrochlorothiazide (HYDRODIURIL) 25 MG tablet; Take 1 tablet (25 mg) by mouth daily Patient will call when needs filled.  - rosuvastatin (CRESTOR) 5 MG tablet; Take 1 tablet (5 mg) by mouth daily Patient will call when needs filled.  - losartan (COZAAR) 25 MG tablet; Take 1 tablet (25 mg) by mouth daily    Ankylosing spondylitis, unspecified site of spine (H)  Equivocal diagnosis. Only taking ibuprofen at this time, 12 per day. Discussed potential risk to kidneys and increased cardiac risk with daily use of this. Discussed importance of MRI in sorting out the diagnosis so it is easier to tell which medication/treatment course to pursue, inflammatory process vs degenerative. Discussed option to see rheum again, but that MRI may be needed to help them. She agrees to check on her cost for MRI, discussed potential cost of medications that may not help if we don't have firm diagnosis. Also discussed that PT should be helpful either way. For now she prefers to try at home exercises. Could try topicals and tylenol bid.     Hyperlipidemia LDL goal <130  TGL and HDL mildly out of range. Discussed option to increase crestor, but with other medication changes and stopping ibuprofen, will hold off for now.  - hydrochlorothiazide (HYDRODIURIL) 25 MG tablet; Take 1 tablet (25 mg) by mouth daily Patient will call when needs filled.  - rosuvastatin (CRESTOR) 5 MG tablet; Take 1 tablet (5 mg) by mouth daily Patient will call when needs filled.        See Patient Instructions    Return in about 2 months (around 1/11/2021) for Follow Up Visit.    Ayla Graf MD  [facility]    Phone call duration:  35 minutes

## 2020-12-14 ENCOUNTER — ANCILLARY PROCEDURE (OUTPATIENT)
Dept: MAMMOGRAPHY | Facility: CLINIC | Age: 60
End: 2020-12-14
Attending: INTERNAL MEDICINE
Payer: COMMERCIAL

## 2020-12-14 DIAGNOSIS — E66.01 MORBID OBESITY (H): ICD-10-CM

## 2020-12-14 DIAGNOSIS — Z12.31 ENCOUNTER FOR SCREENING MAMMOGRAM FOR BREAST CANCER: ICD-10-CM

## 2020-12-14 LAB
ANION GAP SERPL CALCULATED.3IONS-SCNC: 1 MMOL/L (ref 3–14)
BUN SERPL-MCNC: 20 MG/DL (ref 7–30)
CALCIUM SERPL-MCNC: 11.3 MG/DL (ref 8.5–10.1)
CHLORIDE SERPL-SCNC: 105 MMOL/L (ref 94–109)
CO2 SERPL-SCNC: 32 MMOL/L (ref 20–32)
CREAT SERPL-MCNC: 0.94 MG/DL (ref 0.52–1.04)
GFR SERPL CREATININE-BSD FRML MDRD: 65 ML/MIN/{1.73_M2}
GLUCOSE SERPL-MCNC: 105 MG/DL (ref 70–99)
HBA1C MFR BLD: 6 % (ref 0–5.6)
POTASSIUM SERPL-SCNC: 2.8 MMOL/L (ref 3.4–5.3)
SODIUM SERPL-SCNC: 138 MMOL/L (ref 133–144)

## 2020-12-14 PROCEDURE — 83036 HEMOGLOBIN GLYCOSYLATED A1C: CPT | Performed by: INTERNAL MEDICINE

## 2020-12-14 PROCEDURE — 77067 SCR MAMMO BI INCL CAD: CPT | Performed by: RADIOLOGY

## 2020-12-14 PROCEDURE — 36415 COLL VENOUS BLD VENIPUNCTURE: CPT | Performed by: INTERNAL MEDICINE

## 2020-12-14 PROCEDURE — 80048 BASIC METABOLIC PNL TOTAL CA: CPT | Performed by: INTERNAL MEDICINE

## 2020-12-15 ENCOUNTER — TELEPHONE (OUTPATIENT)
Dept: PEDIATRICS | Facility: CLINIC | Age: 60
End: 2020-12-15

## 2020-12-15 DIAGNOSIS — E87.6 HYPOKALEMIA: Primary | ICD-10-CM

## 2020-12-15 DIAGNOSIS — E83.52 HYPERCALCEMIA: ICD-10-CM

## 2020-12-16 DIAGNOSIS — E87.6 HYPOKALEMIA: ICD-10-CM

## 2020-12-16 DIAGNOSIS — E83.52 HYPERCALCEMIA: ICD-10-CM

## 2020-12-16 LAB
ANION GAP SERPL CALCULATED.3IONS-SCNC: <1 MMOL/L (ref 3–14)
BUN SERPL-MCNC: 17 MG/DL (ref 7–30)
CALCIUM SERPL-MCNC: 10.3 MG/DL (ref 8.5–10.1)
CHLORIDE SERPL-SCNC: 108 MMOL/L (ref 94–109)
CO2 SERPL-SCNC: 33 MMOL/L (ref 20–32)
CREAT SERPL-MCNC: 1.1 MG/DL (ref 0.52–1.04)
GFR SERPL CREATININE-BSD FRML MDRD: 53 ML/MIN/{1.73_M2}
GLUCOSE SERPL-MCNC: 107 MG/DL (ref 70–99)
POTASSIUM SERPL-SCNC: 2.9 MMOL/L (ref 3.4–5.3)
SODIUM SERPL-SCNC: 137 MMOL/L (ref 133–144)

## 2020-12-16 PROCEDURE — 36415 COLL VENOUS BLD VENIPUNCTURE: CPT | Performed by: INTERNAL MEDICINE

## 2020-12-16 PROCEDURE — 83970 ASSAY OF PARATHORMONE: CPT | Performed by: INTERNAL MEDICINE

## 2020-12-16 PROCEDURE — 82306 VITAMIN D 25 HYDROXY: CPT | Performed by: INTERNAL MEDICINE

## 2020-12-16 PROCEDURE — 80048 BASIC METABOLIC PNL TOTAL CA: CPT | Performed by: INTERNAL MEDICINE

## 2020-12-16 NOTE — TELEPHONE ENCOUNTER
Call placed to pt with message from provider.  Pt verbalized understanding and agrees to the plan    Lab only scheduled for 12/21 at 1610    Thank you  Bucky Ngo RN on 12/16/2020 at 5:08 PM

## 2020-12-16 NOTE — TELEPHONE ENCOUNTER
To triage: Please watch for K result wed afternoon, requested stat but she can't come in for lab until 3PM. If increasing, OK to continue with bananas bid for 5 days and recheck bmp next Monday.     I called patient.   Low potassium result with new hypercalcemia. No new medications, though potassium has tended to run low normal or just below.   Tried to decrease her nsaids for 4 days but didn't tolerate.    Discussed with K at 2.8, would recommend she go to ER for recheck and replacement with EKG to rule out conduction abnormalities which can be fatal. Given level at 2.8 and current COVID-19 pandemic, she prefers to take bananas twice daily starting tonight, stop hydrochlorothiazide, and recheck lab in clinic tomorrow. Will add evaluation for hypercalcemia tomorrow as well.    Likely will need to increase losartan. She was instructed to stop her hydrochlorothiazide.   Ayla Graf M.D.

## 2020-12-17 LAB — PTH-INTACT SERPL-MCNC: 59 PG/ML (ref 18–80)

## 2020-12-19 LAB — DEPRECATED CALCIDIOL+CALCIFEROL SERPL-MC: 33 UG/L (ref 20–75)

## 2020-12-21 DIAGNOSIS — E87.6 HYPOKALEMIA: Primary | ICD-10-CM

## 2020-12-21 DIAGNOSIS — E87.6 HYPOKALEMIA: ICD-10-CM

## 2020-12-21 PROCEDURE — 36415 COLL VENOUS BLD VENIPUNCTURE: CPT | Performed by: INTERNAL MEDICINE

## 2020-12-21 PROCEDURE — 80048 BASIC METABOLIC PNL TOTAL CA: CPT | Performed by: INTERNAL MEDICINE

## 2020-12-22 LAB
ANION GAP SERPL CALCULATED.3IONS-SCNC: 2 MMOL/L (ref 3–14)
BUN SERPL-MCNC: 17 MG/DL (ref 7–30)
CALCIUM SERPL-MCNC: 9.4 MG/DL (ref 8.5–10.1)
CHLORIDE SERPL-SCNC: 112 MMOL/L (ref 94–109)
CO2 SERPL-SCNC: 28 MMOL/L (ref 20–32)
CREAT SERPL-MCNC: 0.97 MG/DL (ref 0.52–1.04)
GFR SERPL CREATININE-BSD FRML MDRD: 63 ML/MIN/{1.73_M2}
GLUCOSE SERPL-MCNC: 98 MG/DL (ref 70–99)
POTASSIUM SERPL-SCNC: 3.4 MMOL/L (ref 3.4–5.3)
SODIUM SERPL-SCNC: 142 MMOL/L (ref 133–144)

## 2020-12-23 ENCOUNTER — TELEPHONE (OUTPATIENT)
Dept: PEDIATRICS | Facility: CLINIC | Age: 60
End: 2020-12-23

## 2020-12-23 DIAGNOSIS — E83.52 HYPERCALCEMIA: ICD-10-CM

## 2020-12-23 DIAGNOSIS — E87.6 HYPOKALEMIA: Primary | ICD-10-CM

## 2020-12-23 DIAGNOSIS — I10 ESSENTIAL HYPERTENSION: ICD-10-CM

## 2020-12-23 NOTE — TELEPHONE ENCOUNTER
Please call her. Repeat potassium slightly improved. Calcium improved, but not yet normal. Would like repeat next week. Other labs normal. Continue off hydrochlorothiazide.  Ayla Graf M.D.

## 2020-12-24 NOTE — TELEPHONE ENCOUNTER
Call placed to pt with message  Relayed message from provider    Pt verbalized understanding and agrees to the plan  Lab appt for next week    MA/TC: Please call pt next week to schedule an appt for mid January    Thank you  Bucky Ngo RN on 12/24/2020 at 9:29 AM

## 2020-12-28 RX ORDER — SPIRONOLACTONE 25 MG/1
25 TABLET ORAL 2 TIMES DAILY
Qty: 60 TABLET | Refills: 1 | Status: SHIPPED | OUTPATIENT
Start: 2020-12-28 | End: 2021-03-31

## 2020-12-28 NOTE — TELEPHONE ENCOUNTER
Left her a message with these details. Will try her again tomorrow just to make sure she got the message. Ashley Garibay RN on 12/28/2020 at 5:33 PM

## 2020-12-28 NOTE — TELEPHONE ENCOUNTER
Start spironolactone. A different diuretic that does not decrease potassium. In rare cases it can increase the potassium, especially when combined with losartan, but monitoring levels will help us pick this up.  Would start at 25 mg twice daily.  Recheck lab 1 month after starting and need blood pressure follow up at that time as well. If she is taking bp at home, she could just let us know.  Ayla Graf M.D.

## 2020-12-28 NOTE — TELEPHONE ENCOUNTER
Spoke to pt and relayed message. She has been above goal for a couple weeks at least. She has been getting 150/80 to 177/90. In the morning she is usually is better 120/60.     She also has a lot of swelling in her legs. By the end of the days she has quite a bit of pain. They are hard and swollen.     She is coming in for potassium check on TH because she is no longer eating bananas.     Please advise. Ashley Garibay RN on 12/28/2020 at 4:31 PM

## 2020-12-28 NOTE — TELEPHONE ENCOUNTER
Lab from last week normal. No need for recheck. Continue off hydrochlorothiazide and follow up if bp above goal. <130/80  Ayla Graf M.D.

## 2020-12-29 NOTE — TELEPHONE ENCOUNTER
Clinic Action Needed:Yes, please return call    Reason for Call: Katharina returned call from  Voice message received from clinic today.  She wants to make 100% sure that she does not need to come in for any labs this week, she has a lab for this Thursday to have re-check on her Calcium per note from Dr. Graf dated 12/23/2020.   Another note states that she does not need re-check of labs until one month after starting new medication spironolactone which was ordered for her today.    Katharina was notified that a new script was sent to pharmacy which is a new diuretic and she should have lab check in one month and also monitor blood pressures and report to clinic.    Please return call and clarify if her lab appointment Thursday 12/31/2020 for calcium re-check should be canceled.    She also mentioned that when calls come in from clinic it states [employer] and her [employer] cell phone will not let her answer it.  She did get voice mail, so it's ok to leave a detailed message for her.  Her work numer if [phone] and you could also try that number.  Thank you.    Routed to: AMBER TRACEY3 IRMA [place], MYRTLE  [facility]

## 2020-12-29 NOTE — TELEPHONE ENCOUNTER
pls apologize to her. Her results last week came back just before I made that plan from prior labs and I didn't see the normal results before she was called. Repeat potassium and calcium completely normal.  Check lab in 1 month and start spironolactone.  Ayla Graf M.D.

## 2020-12-29 NOTE — TELEPHONE ENCOUNTER
Patient confused about plan of care. Patient was told 12/23 to recheck her calcium this week (shceudled 12/31)    After reporting elevated BP again and LE edema - 12/28 started on Spironolactone and advised 1mo lab/BP check.    Will route to provider to confirm if patient needs calcium rechecked this week or ok to wait until 1mo lab appt    Once provider reviews: please route to SB4 PAL to update patient and assist with scheduling lab in 1mo and BP check.   2015

## 2020-12-29 NOTE — TELEPHONE ENCOUNTER
Called pt. Left detailed message with instructions from Dr. Grfa. Advised to call back with any questions.Ashley Garibay RN on 12/29/2020 at 2:18 PM

## 2021-01-12 DIAGNOSIS — E66.01 MORBID OBESITY (H): ICD-10-CM

## 2021-01-12 NOTE — TELEPHONE ENCOUNTER
Routing refill request to provider for review/approval because:  Drug not on the [facility] refill protocol     Piedad Larsen RN   [facility] -- Triage Nurse

## 2021-01-13 RX ORDER — PHENTERMINE HYDROCHLORIDE 30 MG/1
CAPSULE ORAL
Qty: 30 CAPSULE | Refills: 0 | Status: SHIPPED | OUTPATIENT
Start: 2021-01-13 | End: 2021-02-03

## 2021-01-22 DIAGNOSIS — E78.5 HYPERLIPIDEMIA LDL GOAL <130: ICD-10-CM

## 2021-01-22 DIAGNOSIS — I10 ESSENTIAL HYPERTENSION: ICD-10-CM

## 2021-01-22 NOTE — TELEPHONE ENCOUNTER
Routing refill request to provider for review/approval because:  Drug not active on patient's medication list    Erika Segura RN on 1/22/2021 at 9:24 AM

## 2021-01-27 RX ORDER — ROSUVASTATIN CALCIUM 5 MG/1
TABLET, COATED ORAL
Qty: 90 TABLET | Refills: 3 | Status: SHIPPED | OUTPATIENT
Start: 2021-01-27 | End: 2021-10-04

## 2021-01-27 RX ORDER — HYDROCHLOROTHIAZIDE 25 MG/1
TABLET ORAL
Qty: 90 TABLET | Refills: 0 | Status: SHIPPED | OUTPATIENT
Start: 2021-01-27 | End: 2021-02-03

## 2021-01-29 DIAGNOSIS — E87.6 HYPOKALEMIA: ICD-10-CM

## 2021-01-29 DIAGNOSIS — E83.52 HYPERCALCEMIA: ICD-10-CM

## 2021-01-29 PROCEDURE — 80048 BASIC METABOLIC PNL TOTAL CA: CPT | Performed by: INTERNAL MEDICINE

## 2021-01-29 PROCEDURE — 82330 ASSAY OF CALCIUM: CPT | Performed by: INTERNAL MEDICINE

## 2021-01-29 PROCEDURE — 36415 COLL VENOUS BLD VENIPUNCTURE: CPT | Performed by: INTERNAL MEDICINE

## 2021-01-30 LAB
ANION GAP SERPL CALCULATED.3IONS-SCNC: 4 MMOL/L (ref 3–14)
BUN SERPL-MCNC: 20 MG/DL (ref 7–30)
CA-I SERPL ISE-MCNC: 5.7 MG/DL (ref 4.4–5.2)
CALCIUM SERPL-MCNC: 10.8 MG/DL (ref 8.5–10.1)
CHLORIDE SERPL-SCNC: 108 MMOL/L (ref 94–109)
CO2 SERPL-SCNC: 27 MMOL/L (ref 20–32)
CREAT SERPL-MCNC: 1.05 MG/DL (ref 0.52–1.04)
GFR SERPL CREATININE-BSD FRML MDRD: 57 ML/MIN/{1.73_M2}
GLUCOSE SERPL-MCNC: 96 MG/DL (ref 70–99)
POTASSIUM SERPL-SCNC: 3.8 MMOL/L (ref 3.4–5.3)
SODIUM SERPL-SCNC: 140 MMOL/L (ref 133–144)

## 2021-02-03 ENCOUNTER — VIRTUAL VISIT (OUTPATIENT)
Dept: PEDIATRICS | Facility: CLINIC | Age: 61
End: 2021-02-03
Payer: COMMERCIAL

## 2021-02-03 DIAGNOSIS — E66.01 MORBID OBESITY (H): ICD-10-CM

## 2021-02-03 DIAGNOSIS — I10 ESSENTIAL HYPERTENSION: ICD-10-CM

## 2021-02-03 DIAGNOSIS — I89.0 LYMPHEDEMA: ICD-10-CM

## 2021-02-03 DIAGNOSIS — E83.52 HYPERCALCEMIA: Primary | ICD-10-CM

## 2021-02-03 PROCEDURE — 99214 OFFICE O/P EST MOD 30 MIN: CPT | Mod: TEL | Performed by: INTERNAL MEDICINE

## 2021-02-03 RX ORDER — PHENTERMINE HYDROCHLORIDE 30 MG/1
30 CAPSULE ORAL EVERY MORNING
Qty: 30 CAPSULE | Refills: 0 | Status: SHIPPED | OUTPATIENT
Start: 2021-02-03 | End: 2021-03-10

## 2021-02-03 RX ORDER — SPIRONOLACTONE 50 MG/1
50 TABLET, FILM COATED ORAL DAILY
Qty: 60 TABLET | Refills: 1 | Status: SHIPPED | OUTPATIENT
Start: 2021-02-03 | End: 2021-03-31

## 2021-02-03 NOTE — PROGRESS NOTES
Katharina is a 60 year old who is being evaluated via a billable telephone visit.      Assessment & Plan     Hypercalcemia  Has improved since stopping hydrochlorothiazide a month ago, but still elevated. Discussed possible causes, thiazide effects still clearing, parathyroid problem, malignancy. Since improving, will recheck in 1 month with additional lab to help confirm cause.   - spironolactone (ALDACTONE) 50 MG tablet; Take 1 tablet (50 mg) by mouth daily  - **TSH with free T4 reflex FUTURE anytime; Future  - **Basic metabolic panel FUTURE anytime; Future  - PTH Related Peptide Test; Future    Morbid obesity (H)  Doing very well on this dose. Reviewed her current diet. Main issue now is she is not exercising. Was walking in summer but now, though working from home, is still her busy season. Discussed how increasing her activity now may help improve her stamina for upcoming cruise. Currently considering adding walks in her apartment building hallways.  - phentermine (ADIPEX-P) 30 MG capsule; Take 1 capsule (30 mg) by mouth every morning    Essential hypertension  Good control. Hypokalemia resolved off thiazide.    Lymphedema  Telephone visit today makes evaluation difficult. Will try increasing her spironolactone and see if her kidney function tolerates that. Also discussed how weight loss could improve her circulation and that without it, this will likely be a permanent issue for her.       See Patient Instructions    Return in about 4 weeks (around 3/3/2021) for Video Visit, Non-fasting Lab.    Ayla Graf MD  [facility] ABY Posadas is a 60 year old who presents to clinic today for the following health issues      HPI       Hypertension Follow-up      Do you check your blood pressure regularly outside of the clinic? Yes in the 126-128/70-78 pulse 63-75 in the am     Evenings 144-156/80-86 pulse 80's    Are you following a low salt diet? yes    Are your blood pressures  "ever more than 140 on the top number (systolic) OR more   than 90 on the bottom number (diastolic), for example 140/90? Yes      How many servings of fruits and vegetables do you eat daily?  2-3    On average, how many sweetened beverages do you drink each day (Examples: soda, juice, sweet tea, etc.  Do NOT count diet or artificially sweetened beverages)?   0    How many days per week do you exercise enough to make your heart beat faster? 0    How many minutes a day do you exercise enough to make your heart beat faster? 0    How many days per week do you miss taking your medication? 0    Follow up on labs, K+ and calcium.    Swelling in legs, worse later in the day    Taking aldactone bid    Last weight 318, but also very swollen so not sure how accurate it is. Feels she has maybe 10 pounds of excess \"water weight\" right now.    Phentermine working well. Eats healthy meals. Just struggling to get walking, feels stress during her busiest time at work. Lasts until May. Does note she has a cruise scheduled for August and with COVID-19 vaccine distribution going, she may be able to go.    Review of Systems   Constitutional, HEENT, cardiovascular, pulmonary, gi and gu systems are negative, except as otherwise noted.      Objective           Vitals:  No vitals were obtained today due to virtual visit.    Physical Exam   healthy, alert and no distress  PSYCH: Alert and oriented times 3; coherent speech, normal   rate and volume, able to articulate logical thoughts, able   to abstract reason, no tangential thoughts, no hallucinations   or delusions  Her affect is normal  RESP: No cough, no audible wheezing, able to talk in full sentences  Remainder of exam unable to be completed due to telephone visits    Orders Only on 01/29/2021   Component Date Value Ref Range Status     Calcium Ionized 01/29/2021 5.7* 4.4 - 5.2 mg/dL Final     Sodium 01/29/2021 140  133 - 144 mmol/L Final     Potassium 01/29/2021 3.8  3.4 - 5.3 mmol/L " Final     Chloride 01/29/2021 108  94 - 109 mmol/L Final     Carbon Dioxide 01/29/2021 27  20 - 32 mmol/L Final     Anion Gap 01/29/2021 4  3 - 14 mmol/L Final     Glucose 01/29/2021 96  70 - 99 mg/dL Final     Urea Nitrogen 01/29/2021 20  7 - 30 mg/dL Final     Creatinine 01/29/2021 1.05* 0.52 - 1.04 mg/dL Final     GFR Estimate 01/29/2021 57* >60 mL/min/[1.73_m2] Final    Comment: Non  GFR Calc  Starting 12/18/2018, serum creatinine based estimated GFR (eGFR) will be   calculated using the Chronic Kidney Disease Epidemiology Collaboration   (CKD-EPI) equation.       GFR Estimate If Black 01/29/2021 66  >60 mL/min/[1.73_m2] Final    Comment:  GFR Calc  Starting 12/18/2018, serum creatinine based estimated GFR (eGFR) will be   calculated using the Chronic Kidney Disease Epidemiology Collaboration   (CKD-EPI) equation.       Calcium 01/29/2021 10.8* 8.5 - 10.1 mg/dL Final             Phone call duration: 30 minutes

## 2021-02-03 NOTE — PATIENT INSTRUCTIONS
Increase spironolactone to 50 mg twice daily.    In 2 weeks if blood pressure still >130/80, let's increase your losartan, just let me know.    Lab in 1 month.

## 2021-02-11 ENCOUNTER — TELEPHONE (OUTPATIENT)
Dept: LAB | Facility: CLINIC | Age: 61
End: 2021-02-11

## 2021-02-11 DIAGNOSIS — I10 ESSENTIAL HYPERTENSION: ICD-10-CM

## 2021-02-11 NOTE — TELEPHONE ENCOUNTER
Patient calling to check on status of refill.     Elier Wallace at 2:13 PM on 2/11/2021  [facility] Health Guide  Phone [phone]

## 2021-02-11 NOTE — TELEPHONE ENCOUNTER
Routing refill request to provider for review/approval because:  Labs out of range:  Creatinine    Creatinine   Date Value Ref Range Status   01/29/2021 1.05 (H) 0.52 - 1.04 mg/dL Final     Casey ANDRADE RN, BSN

## 2021-02-11 NOTE — TELEPHONE ENCOUNTER
Patient call routed to [facility] via central phone lines. Patient is requesting refill of Losartin. Orders pended and routed to refill pool. Patient says that they only have enough to get through tomorrow before running out of pills.    Elier Wallace at 9:55 AM on 2/11/2021  [facility] Guide  Phone [phone]

## 2021-02-11 NOTE — TELEPHONE ENCOUNTER
Pending Prescriptions:                       Disp   Refills    losartan (COZAAR) 25 MG tablet            30 tab*2            Sig: Take 1 tablet (25 mg) by mouth daily

## 2021-02-12 RX ORDER — LOSARTAN POTASSIUM 25 MG/1
25 TABLET ORAL DAILY
Qty: 30 TABLET | Refills: 2 | Status: SHIPPED | OUTPATIENT
Start: 2021-02-12 | End: 2021-03-31 | Stop reason: DRUGHIGH

## 2021-02-12 NOTE — TELEPHONE ENCOUNTER
Patient called back requesting update of refill request.    Elier Wallace at 1:58 PM on 2/12/2021  [facility] Health Guide  Phone [phone]

## 2021-03-05 DIAGNOSIS — E83.52 HYPERCALCEMIA: ICD-10-CM

## 2021-03-05 PROCEDURE — 80048 BASIC METABOLIC PNL TOTAL CA: CPT | Performed by: INTERNAL MEDICINE

## 2021-03-05 PROCEDURE — 82542 COL CHROMOTOGRAPHY QUAL/QUAN: CPT | Mod: 90 | Performed by: INTERNAL MEDICINE

## 2021-03-05 PROCEDURE — 36415 COLL VENOUS BLD VENIPUNCTURE: CPT | Performed by: INTERNAL MEDICINE

## 2021-03-05 PROCEDURE — 99000 SPECIMEN HANDLING OFFICE-LAB: CPT | Performed by: INTERNAL MEDICINE

## 2021-03-05 PROCEDURE — 84443 ASSAY THYROID STIM HORMONE: CPT | Performed by: INTERNAL MEDICINE

## 2021-03-06 LAB
ANION GAP SERPL CALCULATED.3IONS-SCNC: 3 MMOL/L (ref 3–14)
BUN SERPL-MCNC: 18 MG/DL (ref 7–30)
CALCIUM SERPL-MCNC: 10.3 MG/DL (ref 8.5–10.1)
CHLORIDE SERPL-SCNC: 110 MMOL/L (ref 94–109)
CO2 SERPL-SCNC: 27 MMOL/L (ref 20–32)
CREAT SERPL-MCNC: 1.06 MG/DL (ref 0.52–1.04)
GFR SERPL CREATININE-BSD FRML MDRD: 56 ML/MIN/{1.73_M2}
GLUCOSE SERPL-MCNC: 101 MG/DL (ref 70–99)
POTASSIUM SERPL-SCNC: 3.7 MMOL/L (ref 3.4–5.3)
SODIUM SERPL-SCNC: 140 MMOL/L (ref 133–144)
TSH SERPL DL<=0.005 MIU/L-ACNC: 1.77 MU/L (ref 0.4–4)

## 2021-03-10 DIAGNOSIS — E66.01 MORBID OBESITY (H): ICD-10-CM

## 2021-03-10 RX ORDER — PHENTERMINE HYDROCHLORIDE 30 MG/1
30 CAPSULE ORAL EVERY MORNING
Qty: 30 CAPSULE | Refills: 0 | Status: SHIPPED | OUTPATIENT
Start: 2021-03-10 | End: 2021-03-31

## 2021-03-10 NOTE — TELEPHONE ENCOUNTER
Routing refill request to provider for review/approval because:  Drug not on the [facility] refill protocol     Piedad Larsen RN   [facility] -- Triage Nurse

## 2021-03-10 NOTE — TELEPHONE ENCOUNTER
Pending Prescriptions:                       Disp   Refills    phentermine (ADIPEX-P) 30 MG capsule      30 cap*0            Sig: Take 1 capsule (30 mg) by mouth every morning

## 2021-03-10 NOTE — TELEPHONE ENCOUNTER
Patient called and left message requesting refill of Phentermine (ADIPEX-P) 30mg Capsule. Orders pended and routed to refill pool.    Elier Wallace at 11:54 AM on 3/10/2021  [facility] Health Guide  Phone [phone]

## 2021-03-15 ENCOUNTER — TELEPHONE (OUTPATIENT)
Dept: PEDIATRICS | Facility: CLINIC | Age: 61
End: 2021-03-15
Payer: COMMERCIAL

## 2021-03-15 NOTE — TELEPHONE ENCOUNTER
Prior Authorization Retail Medication Request    Medication/Dose: phentermine (ADIPEX-P) 30 MG capsule  ICD code (if different than what is on RX):    Previously Tried and Failed:   Rationale:  Morbid obesity needed for morbid obesity    Insurance Name:  [employer] Ph: [phone]   Insurance ID:  [id number]       Pharmacy Information (if different than what is on RX)  Name:    Phone:      Sally Bowers LPN

## 2021-03-17 NOTE — TELEPHONE ENCOUNTER
Central Prior Authorization Team   Phone: [phone]    PA Initiation    Medication: phentermine (ADIPEX-P) 30 MG capsule  Insurance Company: [employer] - Phone [phone] Fax [phone]  Pharmacy Filling the Rx: [id number] [place], [place] - [address] AT [facility]  Filling Pharmacy Phone: [phone]  Filling Pharmacy Fax: [phone]  Start Date: 3/17/2021

## 2021-03-17 NOTE — TELEPHONE ENCOUNTER
Prior Authorization Approval    Authorization Effective Date: 2/15/2021  Authorization Expiration Date: 3/17/2022  Medication: phentermine (ADIPEX-P) 30 MG-APPROVED  Approved Dose/Quantity:    Reference #:     Insurance Company: [employer] - Phone [phone] Fax [phone]  Expected CoPay:       CoPay Card Available:      Foundation Assistance Needed:    Which Pharmacy is filling the prescription (Not needed for infusion/clinic administered): [facility] #[id number] - [address] AT [address]  Pharmacy Notified: Yes  Patient Notified: Yes  **Instructed pharmacy to notify patient when script is ready to /ship.**

## 2021-03-23 ENCOUNTER — ANCILLARY PROCEDURE (OUTPATIENT)
Dept: GENERAL RADIOLOGY | Facility: CLINIC | Age: 61
End: 2021-03-23
Attending: PHYSICIAN ASSISTANT
Payer: COMMERCIAL

## 2021-03-23 ENCOUNTER — OFFICE VISIT (OUTPATIENT)
Dept: URGENT CARE | Facility: URGENT CARE | Age: 61
End: 2021-03-23
Payer: COMMERCIAL

## 2021-03-23 ENCOUNTER — NURSE TRIAGE (OUTPATIENT)
Dept: NURSING | Facility: CLINIC | Age: 61
End: 2021-03-23

## 2021-03-23 VITALS
HEART RATE: 109 BPM | OXYGEN SATURATION: 96 % | DIASTOLIC BLOOD PRESSURE: 64 MMHG | WEIGHT: 293 LBS | TEMPERATURE: 97.3 F | SYSTOLIC BLOOD PRESSURE: 116 MMHG | BODY MASS INDEX: 49.18 KG/M2

## 2021-03-23 DIAGNOSIS — R10.9 FLANK PAIN: Primary | ICD-10-CM

## 2021-03-23 DIAGNOSIS — R10.9 FLANK PAIN: ICD-10-CM

## 2021-03-23 LAB
ALBUMIN UR-MCNC: NEGATIVE MG/DL
APPEARANCE UR: CLEAR
BACTERIA #/AREA URNS HPF: ABNORMAL /HPF
BASOPHILS # BLD AUTO: 0 10E9/L (ref 0–0.2)
BASOPHILS NFR BLD AUTO: 0.2 %
BILIRUB UR QL STRIP: NEGATIVE
COLOR UR AUTO: YELLOW
DIFFERENTIAL METHOD BLD: ABNORMAL
EOSINOPHIL # BLD AUTO: 0 10E9/L (ref 0–0.7)
EOSINOPHIL NFR BLD AUTO: 0.1 %
ERYTHROCYTE [DISTWIDTH] IN BLOOD BY AUTOMATED COUNT: 13 % (ref 10–15)
GLUCOSE UR STRIP-MCNC: NEGATIVE MG/DL
HCT VFR BLD AUTO: 41.8 % (ref 35–47)
HGB BLD-MCNC: 13.8 G/DL (ref 11.7–15.7)
HGB UR QL STRIP: ABNORMAL
KETONES UR STRIP-MCNC: NEGATIVE MG/DL
LEUKOCYTE ESTERASE UR QL STRIP: NEGATIVE
LYMPHOCYTES # BLD AUTO: 1.3 10E9/L (ref 0.8–5.3)
LYMPHOCYTES NFR BLD AUTO: 9.4 %
MCH RBC QN AUTO: 30.5 PG (ref 26.5–33)
MCHC RBC AUTO-ENTMCNC: 33 G/DL (ref 31.5–36.5)
MCV RBC AUTO: 92 FL (ref 78–100)
MONOCYTES # BLD AUTO: 0.5 10E9/L (ref 0–1.3)
MONOCYTES NFR BLD AUTO: 3.8 %
NEUTROPHILS # BLD AUTO: 12.2 10E9/L (ref 1.6–8.3)
NEUTROPHILS NFR BLD AUTO: 86.5 %
NITRATE UR QL: NEGATIVE
NON-SQ EPI CELLS #/AREA URNS LPF: ABNORMAL /LPF
PH UR STRIP: 6.5 PH (ref 5–7)
PLATELET # BLD AUTO: 304 10E9/L (ref 150–450)
RBC # BLD AUTO: 4.53 10E12/L (ref 3.8–5.2)
RBC #/AREA URNS AUTO: ABNORMAL /HPF
SOURCE: ABNORMAL
SP GR UR STRIP: 1.02 (ref 1–1.03)
UROBILINOGEN UR STRIP-ACNC: 0.2 EU/DL (ref 0.2–1)
WBC # BLD AUTO: 14.1 10E9/L (ref 4–11)
WBC #/AREA URNS AUTO: ABNORMAL /HPF

## 2021-03-23 PROCEDURE — 36415 COLL VENOUS BLD VENIPUNCTURE: CPT | Performed by: PHYSICIAN ASSISTANT

## 2021-03-23 PROCEDURE — 81001 URINALYSIS AUTO W/SCOPE: CPT | Performed by: PHYSICIAN ASSISTANT

## 2021-03-23 PROCEDURE — 85025 COMPLETE CBC W/AUTO DIFF WBC: CPT | Performed by: PHYSICIAN ASSISTANT

## 2021-03-23 PROCEDURE — 99214 OFFICE O/P EST MOD 30 MIN: CPT | Performed by: PHYSICIAN ASSISTANT

## 2021-03-23 PROCEDURE — 74019 RADEX ABDOMEN 2 VIEWS: CPT | Performed by: RADIOLOGY

## 2021-03-23 RX ORDER — TAMSULOSIN HYDROCHLORIDE 0.4 MG/1
0.4 CAPSULE ORAL DAILY
Qty: 7 CAPSULE | Refills: 0 | Status: SHIPPED | OUTPATIENT
Start: 2021-03-23 | End: 2021-03-31

## 2021-03-23 ASSESSMENT — PAIN SCALES - GENERAL: PAINLEVEL: WORST PAIN (10)

## 2021-03-23 NOTE — PATIENT INSTRUCTIONS
Patient Education     Unknown Causes of Abdominal Pain (Female)    The exact cause of your belly (abdominal) pain is not clear. This does not mean that this is something to worry about. Everyone likes to know the exact cause of the problem. But sometimes with belly pain, there is no clear-cut cause, and this could be a good thing. The good news is that your symptoms can be treated, and you will feel better.   Your condition does not seem serious now. But sometimes the signs of a serious problem may take more time to appear. For this reason, it is important for you to watch for any new symptoms, problems, or worsening of your condition.  Over the next few days, the abdominal pain may come and go. Or it may be constant. Other common symptoms can include nausea and vomiting. Sometimes it can be difficult to tell if you feel nauseous. You may just feel bad and not connect that feeling to nausea. Constipation, diarrhea, and a fever may go along with the pain.  The pain may continue even if treated correctly over the following days. Depending on how things go, sometimes the cause can become clear and may need more or different treatment. Additional evaluations, medicines, or tests may also be needed.  Home care  Your healthcare provider may prescribe medicine for pain, symptoms, or an infection.  Follow the healthcare provider's instructions for taking these medicines.  General care    Rest as much as you can until your next exam. No strenuous activities.    Try to find positions that ease discomfort. A small pillow placed on the abdomen may help relieve pain.    Something warm on your abdomen (such as a heating pad) may help, but be careful not to burn yourself.  Diet    Don t force yourself to eat, especially if having cramps, vomiting, or diarrhea.    Water is important so you don't get dehydrated. Soup may also be good. Sports drinks may also help, especially if they are not too acidic. Don't drink sugary drinks as  this can make things worse. Take liquids in small amounts. Don t guzzle them.    Caffeine sometimes makes the pain and cramping worse.    Don t take dairy products if you have vomiting or diarrhea.    Don't eat large amounts at a time. Wait a few minutes between bites.    Eat a diet low in fiber (called a low-residue diet). Foods allowed include refined breads, white rice, fruit and vegetable juices without pulp, tender meats. These foods will pass more easily through the intestine.    Don t have whole-grain foods, whole fruits and vegetables, meats, seeds and nuts, fried or fatty foods, dairy, alcohol and spicy foods until your symptoms go away.  Follow-up care  Follow up with your healthcare provider, or as advised, if your pain does not begin to improve in the next 24 hours.  Call 911  Call 911 if any of these occur:    Trouble breathing    Confusion    Fainting or loss of consciousness    Rapid heart rate    Seizure  When to seek medical advice  Call your healthcare provider right away if any of these occur:    Pain gets worse or moves to the right lower abdomen    New or worsening vomiting or diarrhea    Swelling of the abdomen    Unable to pass stool for more than 3 days    Fever of 100.4 F (38 C) or higher, or as directed by your healthcare provider.    Blood in vomit or bowel movements (dark red or black color)    Yellow color of eyes and skin (jaundice)    Weakness, dizziness    Chest, arm, back, neck, or jaw pain    Unexpected vaginal bleeding or missed period    Can't keep down liquids or water and you are getting dehydrated  [employer] last reviewed this educational content on 6/1/2018 2000-2020 [employer]. All rights reserved. This information is not intended as a substitute for professional medical care. Always follow your healthcare professional's instructions.           Patient Education     Flank Pain with Uncertain Cause    The flank is the area between your upper belly (abdomen)  and your back. Pain there is often caused by a problem with your kidneys. It might be a kidney infection or a kidney stone. Other causes of flank pain include spinal arthritis, a pinched nerve from a back injury, a rib injury, a bruise, a back muscle strain, inflammation, or spasm.  The cause of your flank pain is not certain. You may need other tests.  Home care  Follow these tips when caring for yourself at home:    You may use acetaminophen or ibuprofen to control pain, unless your healthcare provider prescribed another medicine. If you have chronic liver or kidney disease, talk with your provider before taking these medicines. Also talk with your provider first if you ve ever had a stomach ulcer or digestive bleeding.    If the pain is coming from your muscles, you may get relief with ice or heat. During the first 2 days after the injury, put an ice pack on the painful area for 20 minutes every 2 to 4 hours. This will reduce swelling and pain. A hot shower, hot bath, or heating pad works well for a muscle spasm. You can start with ice, then switch to heat after 2 days. You might find that alternating ice and heat works well. Use the method that feels the best to you.  Follow-up care  Follow up with your healthcare provider if your symptoms don t get better over the next few days.  When to seek medical advice  Call your healthcare provider right away if any of these happen:    Repeated vomiting    Fever of 100.4 F (38 C) or higher, or as directed by your healthcare provider    Flank pain that gets worse    Pain that spreads to the front of your belly (abdomen)    Dizziness, weakness, or fainting    Blood in your urine    Burning feeling when you urinate or the need to urinate often    Pain in one of your legs that gets worse    Numbness or weakness in a leg  [employer] last reviewed this educational content on 10/1/2019    5079-1795 [employer]. All rights reserved. This information is not intended as  a substitute for professional medical care. Always follow your healthcare professional's instructions.           Patient Education     Understanding Kidney Stones  Your kidneys are bean-shaped organs. They help filter extra salts, waste, and water from your body. You need to drink enough water every day to help flush the extra salts into your urine. Aim for 6 to 8 8-ounce cups every day.   What are kidney stones?  Kidney stones are made up of chemical crystals that separate out from urine. These crystals clump together to make stones. They may stay in the kidney or move into the urinary tract.    Why kidney stones form  Kidneys form stones for many reasons. If you don t drink enough water, for instance, you won t have enough urine to dilute chemicals. Then the chemicals may form crystals, which can develop into stones. Here are some reasons why kidney stones form:     Fluid loss (dehydration). This can concentrate urine, causing stones to form.    Certain foods. Some foods contain large amounts of the chemicals that sometimes crystallize into stones. Eating foods that contain a lot of meat or salt can lead to more kidney stones.    Kidney infections. These infections foster stones by slowing urine flow or changing the acid balance of your urine.    Family history. If family members have had kidney stones, you re more likely to have them, too.    A lack of certain substances in your urine. Some substances can help protect you from forming stones. If you don t have enough of these in your urine, stone formation can increase.  Where stones form  Stones begin in the cup-shaped part of the kidney (calyx). Some stay in the calyx and grow. Others move into the kidney, pelvis, or into the ureter. There they can lodge, block the flow of urine, and cause pain.     Symptoms  Many stones cause sudden and severe pain and bloody urine. Others cause nausea or frequent, burning urination. Symptoms often depend on your stone s size  and location. Fever may indicate a serious infection. Call your healthcare provider right away if you develop a fever.   [employer] last reviewed this educational content on 2/1/2020 2000-2020 [employer]. All rights reserved. This information is not intended as a substitute for professional medical care. Always follow your healthcare professional's instructions.

## 2021-03-23 NOTE — PROGRESS NOTES
SUBJECTIVE:   Katharina Mejias is a 60 year old female who  presents today for back pain radiating to groin since this morning.  10/10 pain this morning.  Now she feels pretty good.  Has been drinking lots of water, which has made her pee more.  No dysuria, hesitancy, lower abdominal pain.   No vomiting.     Past Medical History:   Diagnosis Date     Acute and subacute iridocyclitis, unspecified 8/05, 4/06     Ankylosing spondylitis (H)      Lumbago      Other and unspecified ovarian cyst 1985     Other cataract      Tobacco use disorder      Current Outpatient Medications   Medication Sig Dispense Refill     albuterol (PROAIR HFA/PROVENTIL HFA/VENTOLIN HFA) 108 (90 Base) MCG/ACT inhaler Inhale 2 puffs into the lungs every 4 hours as needed for shortness of breath / dyspnea or wheezing 1 Inhaler 1     ibuprofen (ADVIL/MOTRIN) 200 MG tablet Take 400 mg by mouth every 12 hours as needed for mild pain       losartan (COZAAR) 25 MG tablet Take 1 tablet (25 mg) by mouth daily 30 tablet 2     multivitamin, therapeutic with minerals (MULTI-VITAMIN) TABS tablet Take 1 tablet by mouth daily 100 tablet 3     order for DME Equipment being ordered: Jobst or similar support stockings, low pressure 15-20 mmHg or per patient preference. Knee high. 4 each 11     phentermine (ADIPEX-P) 30 MG capsule Take 1 capsule (30 mg) by mouth every morning 30 capsule 0     rosuvastatin (CRESTOR) 5 MG tablet TAKE 1 TABLET DAILY 90 tablet 3     spironolactone (ALDACTONE) 25 MG tablet Take 1 tablet (25 mg) by mouth 2 times daily 60 tablet 1     spironolactone (ALDACTONE) 50 MG tablet Take 1 tablet (50 mg) by mouth daily 60 tablet 1     topiramate (TOPAMAX) 50 MG tablet Take 1 tablet (50 mg) by mouth 2 times daily 60 tablet 11     Social History     Tobacco Use     Smoking status: Current Every Day Smoker     Smokeless tobacco: Never Used   Substance Use Topics     Alcohol use: Yes     Comment: rarely       ROS:   10 point ROS negative except as  listed above      OBJECTIVE:  /64   Pulse 109   Temp 97.3  F (36.3  C) (Tympanic)   Wt 142.4 kg (314 lb)   LMP 07/09/2007 (LMP Unknown)   SpO2 96%   Breastfeeding No   BMI 49.18 kg/m    GENERAL APPEARANCE: healthy, alert and no distress  RESP: lungs clear to auscultation - no rales, rhonchi or wheezes  CV: regular rates and rhythm, normal S1 S2, no murmur noted  ABDOMEN:  soft, nontender, no HSM or masses and bowel sounds normal  BACK: No CVA tenderness  SKIN: no suspicious lesions or rashes      Results for orders placed or performed in visit on 03/23/21   XR KUB     Status: None    Narrative    ABDOMEN [facility] [place]  3/23/2021 3:50 PM     HISTORY: Flank pain    COMPARISON: 4/20/2018      Impression    IMPRESSION: Nonobstructive gas pattern. Normal stool volume. 2 mm  calculus in the left pelvis could represent a phlebolith or a ureteral  calculus. No other suspected urinary tract calculi.    JOAQUÍN HEARD MD   Results for orders placed or performed in visit on 03/23/21   *UA reflex to Microscopic and Culture ([place] [facility] (except [place] and [place])     Status: Abnormal    Specimen: Midstream Urine   Result Value Ref Range    Color Urine Yellow     Appearance Urine Clear     Glucose Urine Negative NEG^Negative mg/dL    Bilirubin Urine Negative NEG^Negative    Ketones Urine Negative NEG^Negative mg/dL    Specific Gravity Urine 1.020 1.003 - 1.035    Blood Urine Large (A) NEG^Negative    pH Urine 6.5 5.0 - 7.0 pH    Protein Albumin Urine Negative NEG^Negative mg/dL    Urobilinogen Urine 0.2 0.2 - 1.0 EU/dL    Nitrite Urine Negative NEG^Negative    Leukocyte Esterase Urine Negative NEG^Negative    Source Midstream Urine    CBC with platelets and differential     Status: Abnormal   Result Value Ref Range    WBC 14.1 (H) 4.0 - 11.0 10e9/L    RBC Count 4.53 3.8 - 5.2 10e12/L    Hemoglobin 13.8 11.7 - 15.7 g/dL    Hematocrit 41.8 35.0 - 47.0 %    MCV 92 78 - 100 fl    MCH 30.5 26.5 - 33.0 pg     MCHC 33.0 31.5 - 36.5 g/dL    RDW 13.0 10.0 - 15.0 %    Platelet Count 304 150 - 450 10e9/L    % Neutrophils 86.5 %    % Lymphocytes 9.4 %    % Monocytes 3.8 %    % Eosinophils 0.1 %    % Basophils 0.2 %    Absolute Neutrophil 12.2 (H) 1.6 - 8.3 10e9/L    Absolute Lymphocytes 1.3 0.8 - 5.3 10e9/L    Absolute Monocytes 0.5 0.0 - 1.3 10e9/L    Absolute Eosinophils 0.0 0.0 - 0.7 10e9/L    Absolute Basophils 0.0 0.0 - 0.2 10e9/L    Diff Method Automated Method    Urine Microscopic     Status: Abnormal   Result Value Ref Range    WBC Urine 0 - 5 OTO5^0 - 5 /HPF    RBC Urine 10-25 (A) OTO2^O - 2 /HPF    Squamous Epithelial /LPF Urine Few FEW^Few /LPF    Bacteria Urine Few (A) NEG^Negative /HPF     X-ray image initially interpreted in clinic by me in order to rule in stone.  No evidence appreciated.    ASSESSMENT:   (R10.9) Flank pain  (primary encounter diagnosis)  Plan: *UA reflex to Microscopic and Culture ([facility] (except [facility] and         [place]), CBC with platelets and differential,         Urine Microscopic, XR KUB, tamsulosin (FLOMAX)         0.4 MG capsule      Red flags and emergent follow up discussed, and understood by patient  Follow up with PCP if symptoms worsen or fail to improve      Patient Instructions     Patient Education     Unknown Causes of Abdominal Pain (Female)    The exact cause of your belly (abdominal) pain is not clear. This does not mean that this is something to worry about. Everyone likes to know the exact cause of the problem. But sometimes with belly pain, there is no clear-cut cause, and this could be a good thing. The good news is that your symptoms can be treated, and you will feel better.   Your condition does not seem serious now. But sometimes the signs of a serious problem may take more time to appear. For this reason, it is important for you to watch for any new symptoms, problems, or worsening of your condition.  Over the next few days, the  abdominal pain may come and go. Or it may be constant. Other common symptoms can include nausea and vomiting. Sometimes it can be difficult to tell if you feel nauseous. You may just feel bad and not connect that feeling to nausea. Constipation, diarrhea, and a fever may go along with the pain.  The pain may continue even if treated correctly over the following days. Depending on how things go, sometimes the cause can become clear and may need more or different treatment. Additional evaluations, medicines, or tests may also be needed.  Home care  Your healthcare provider may prescribe medicine for pain, symptoms, or an infection.  Follow the healthcare provider's instructions for taking these medicines.  General care    Rest as much as you can until your next exam. No strenuous activities.    Try to find positions that ease discomfort. A small pillow placed on the abdomen may help relieve pain.    Something warm on your abdomen (such as a heating pad) may help, but be careful not to burn yourself.  Diet    Don t force yourself to eat, especially if having cramps, vomiting, or diarrhea.    Water is important so you don't get dehydrated. Soup may also be good. Sports drinks may also help, especially if they are not too acidic. Don't drink sugary drinks as this can make things worse. Take liquids in small amounts. Don t guzzle them.    Caffeine sometimes makes the pain and cramping worse.    Don t take dairy products if you have vomiting or diarrhea.    Don't eat large amounts at a time. Wait a few minutes between bites.    Eat a diet low in fiber (called a low-residue diet). Foods allowed include refined breads, white rice, fruit and vegetable juices without pulp, tender meats. These foods will pass more easily through the intestine.    Don t have whole-grain foods, whole fruits and vegetables, meats, seeds and nuts, fried or fatty foods, dairy, alcohol and spicy foods until your symptoms go away.  Follow-up  care  Follow up with your healthcare provider, or as advised, if your pain does not begin to improve in the next 24 hours.  Call 911  Call 911 if any of these occur:    Trouble breathing    Confusion    Fainting or loss of consciousness    Rapid heart rate    Seizure  When to seek medical advice  Call your healthcare provider right away if any of these occur:    Pain gets worse or moves to the right lower abdomen    New or worsening vomiting or diarrhea    Swelling of the abdomen    Unable to pass stool for more than 3 days    Fever of 100.4 F (38 C) or higher, or as directed by your healthcare provider.    Blood in vomit or bowel movements (dark red or black color)    Yellow color of eyes and skin (jaundice)    Weakness, dizziness    Chest, arm, back, neck, or jaw pain    Unexpected vaginal bleeding or missed period    Can't keep down liquids or water and you are getting dehydrated  [employer] last reviewed this educational content on 6/1/2018 2000-2020 [employer]. All rights reserved. This information is not intended as a substitute for professional medical care. Always follow your healthcare professional's instructions.           Patient Education     Flank Pain with Uncertain Cause    The flank is the area between your upper belly (abdomen) and your back. Pain there is often caused by a problem with your kidneys. It might be a kidney infection or a kidney stone. Other causes of flank pain include spinal arthritis, a pinched nerve from a back injury, a rib injury, a bruise, a back muscle strain, inflammation, or spasm.  The cause of your flank pain is not certain. You may need other tests.  Home care  Follow these tips when caring for yourself at home:    You may use acetaminophen or ibuprofen to control pain, unless your healthcare provider prescribed another medicine. If you have chronic liver or kidney disease, talk with your provider before taking these medicines. Also talk with your provider  first if you ve ever had a stomach ulcer or digestive bleeding.    If the pain is coming from your muscles, you may get relief with ice or heat. During the first 2 days after the injury, put an ice pack on the painful area for 20 minutes every 2 to 4 hours. This will reduce swelling and pain. A hot shower, hot bath, or heating pad works well for a muscle spasm. You can start with ice, then switch to heat after 2 days. You might find that alternating ice and heat works well. Use the method that feels the best to you.  Follow-up care  Follow up with your healthcare provider if your symptoms don t get better over the next few days.  When to seek medical advice  Call your healthcare provider right away if any of these happen:    Repeated vomiting    Fever of 100.4 F (38 C) or higher, or as directed by your healthcare provider    Flank pain that gets worse    Pain that spreads to the front of your belly (abdomen)    Dizziness, weakness, or fainting    Blood in your urine    Burning feeling when you urinate or the need to urinate often    Pain in one of your legs that gets worse    Numbness or weakness in a leg  [employer] last reviewed this educational content on 10/1/2019    [id number] [employer]. All rights reserved. This information is not intended as a substitute for professional medical care. Always follow your healthcare professional's instructions.           Patient Education     Understanding Kidney Stones  Your kidneys are bean-shaped organs. They help filter extra salts, waste, and water from your body. You need to drink enough water every day to help flush the extra salts into your urine. Aim for 6 to 8 8-ounce cups every day.   What are kidney stones?  Kidney stones are made up of chemical crystals that separate out from urine. These crystals clump together to make stones. They may stay in the kidney or move into the urinary tract.    Why kidney stones form  Kidneys form stones for many reasons.  If you don t drink enough water, for instance, you won t have enough urine to dilute chemicals. Then the chemicals may form crystals, which can develop into stones. Here are some reasons why kidney stones form:     Fluid loss (dehydration). This can concentrate urine, causing stones to form.    Certain foods. Some foods contain large amounts of the chemicals that sometimes crystallize into stones. Eating foods that contain a lot of meat or salt can lead to more kidney stones.    Kidney infections. These infections foster stones by slowing urine flow or changing the acid balance of your urine.    Family history. If family members have had kidney stones, you re more likely to have them, too.    A lack of certain substances in your urine. Some substances can help protect you from forming stones. If you don t have enough of these in your urine, stone formation can increase.  Where stones form  Stones begin in the cup-shaped part of the kidney (calyx). Some stay in the calyx and grow. Others move into the kidney, pelvis, or into the ureter. There they can lodge, block the flow of urine, and cause pain.     Symptoms  Many stones cause sudden and severe pain and bloody urine. Others cause nausea or frequent, burning urination. Symptoms often depend on your stone s size and location. Fever may indicate a serious infection. Call your healthcare provider right away if you develop a fever.   [employer] last reviewed this educational content on 2/1/2020 2000-2020 [employer]. All rights reserved. This information is not intended as a substitute for professional medical care. Always follow your healthcare professional's instructions.

## 2021-03-23 NOTE — TELEPHONE ENCOUNTER
Katharina calls in with new onset lower abdominal pain. She feels like she needs to have a bowel movement or throw up. She will lay down and drink some fluids and see if it resolves. She denies upper back pain or kidney pain. If it persists and she would like to be seen she will call back for appointment.         Additional Information    Mild abdominal pain    Protocols used: ABDOMINAL PAIN - FEMALE-A-OH

## 2021-03-24 LAB — PTH RELATED PROT SERPL-SCNC: <2 PMOL/L (ref 0–3.4)

## 2021-03-31 ENCOUNTER — VIRTUAL VISIT (OUTPATIENT)
Dept: PEDIATRICS | Facility: CLINIC | Age: 61
End: 2021-03-31
Payer: COMMERCIAL

## 2021-03-31 DIAGNOSIS — I10 ESSENTIAL HYPERTENSION: ICD-10-CM

## 2021-03-31 DIAGNOSIS — E66.01 MORBID OBESITY (H): Primary | ICD-10-CM

## 2021-03-31 DIAGNOSIS — E83.52 HYPERCALCEMIA: ICD-10-CM

## 2021-03-31 DIAGNOSIS — K59.03 DRUG-INDUCED CONSTIPATION: ICD-10-CM

## 2021-03-31 PROCEDURE — 99215 OFFICE O/P EST HI 40 MIN: CPT | Mod: GT | Performed by: INTERNAL MEDICINE

## 2021-03-31 RX ORDER — LOSARTAN POTASSIUM 50 MG/1
50 TABLET ORAL DAILY
Qty: 30 TABLET | Refills: 1 | Status: SHIPPED | OUTPATIENT
Start: 2021-03-31 | End: 2021-06-04

## 2021-03-31 RX ORDER — PHENTERMINE HYDROCHLORIDE 30 MG/1
30 CAPSULE ORAL EVERY MORNING
Qty: 30 CAPSULE | Refills: 0 | Status: SHIPPED | OUTPATIENT
Start: 2021-04-09 | End: 2021-05-05

## 2021-03-31 RX ORDER — SPIRONOLACTONE 50 MG/1
50 TABLET, FILM COATED ORAL DAILY
Qty: 60 TABLET | Refills: 1 | Status: SHIPPED | OUTPATIENT
Start: 2021-03-31 | End: 2021-05-26

## 2021-03-31 NOTE — PROGRESS NOTES
Katharina is a 60 year old who is being evaluated via a billable video visit.      How would you like to obtain your AVS? Mail a copy  If the video visit is dropped, the invitation should be resent by: Text to cell phone: [phone]  Will anyone else be joining your video visit? No    Video Start Time: 11:22 AM    Assessment & Plan     Morbid obesity (H)  Very slow weight loss with phentermine. ozempic has worked for her daughter but has had trouble getting from mail order pharmacy and pt doesn't want to go through that. Discussed oral tab option and she would like to try. May have coverage issues.   - Semaglutide 3 MG TABS; Take 3 mg by mouth daily  - phentermine (ADIPEX-P) 30 MG capsule; Take 1 capsule (30 mg) by mouth every morning    Essential hypertension  \inadequate control with aldactone. Will increase losartan dose and recheck lab in 1 month. She really wants to restart hydrochlorothiazide but given low k and elevated calcium this is not advisable. Will continue with aldactone for now but doesn't seem to be helping much. She is not interested in trying lasix due to worries over urinary urgency  - losartan (COZAAR) 50 MG tablet; Take 1 tablet (50 mg) by mouth daily    Hypercalcemia  Slow improvement. Will recheck with next lab draw and start hypercalcemia workup if still abnormal.   - spironolactone (ALDACTONE) 50 MG tablet; Take 1 tablet (50 mg) by mouth daily    Drug-induced constipation  Recommended miralax to avoid cramping.       42 minutes spent on the date of the encounter doing chart review, history and exam, documentation and further activities per the note       Regular exercise    Return in about 4 weeks (around 4/28/2021) for Non-fasting Lab.    Ayla Graf MD  [facility] Posadas is a 60 year old who presents for the following health issues    HPI     Hypertension Follow-up      Do you check your blood pressure regularly outside of the clinic? Yes  discuss b/p meds, does not feel as good on the aldactone, liked hydrochlorothiazide    Are you following a low salt diet? Yes, no added salt    Are your blood pressures ever more than 140 on the top number (systolic) OR more   than 90 on the bottom number (diastolic), for example 140/90? Yes at night    On spironolactone once daily in AM for all of Feb, accidentally went to bid in March. Didn't see her blood pressure go down even on bid dosing. Lets also more swollen on spironolactone. Toes felt painfully puffy.    Has lost 4 lbs in the last month by her home scale. Would like to be doing better with weight loss. Does think topamax is helping. Has upcomign cruise.    Review of Systems   Constitutional, HEENT, cardiovascular, pulmonary, GI, , musculoskeletal, neuro, skin, endocrine and psych systems are negative, except as otherwise noted.      Objective           Vitals:  No vitals were obtained today due to virtual visit.    Physical Exam   GENERAL: Healthy, alert and no distress  EYES: Eyes grossly normal to inspection.  No discharge or erythema, or obvious scleral/conjunctival abnormalities.  RESP: No audible wheeze, cough, or visible cyanosis.  No visible retractions or increased work of breathing.    SKIN: Visible skin clear. No significant rash, abnormal pigmentation or lesions.  NEURO: Cranial nerves grossly intact.  Mentation and speech appropriate for age.  PSYCH: Mentation appears normal, affect normal/bright, judgement and insight intact, normal speech and appearance well-groomed.    Office Visit on 03/23/2021   Component Date Value Ref Range Status     Color Urine 03/23/2021 Yellow   Final     Appearance Urine 03/23/2021 Clear   Final     Glucose Urine 03/23/2021 Negative  NEG^Negative mg/dL Final     Bilirubin Urine 03/23/2021 Negative  NEG^Negative Final     Ketones Urine 03/23/2021 Negative  NEG^Negative mg/dL Final     Specific Gravity Urine 03/23/2021 1.020  1.003 - 1.035 Final     Blood Urine  03/23/2021 Large* NEG^Negative Final     pH Urine 03/23/2021 6.5  5.0 - 7.0 pH Final     Protein Albumin Urine 03/23/2021 Negative  NEG^Negative mg/dL Final     Urobilinogen Urine 03/23/2021 0.2  0.2 - 1.0 EU/dL Final     Nitrite Urine 03/23/2021 Negative  NEG^Negative Final     Leukocyte Esterase Urine 03/23/2021 Negative  NEG^Negative Final     Source 03/23/2021 Midstream Urine   Final     WBC 03/23/2021 14.1* 4.0 - 11.0 10e9/L Final     RBC Count 03/23/2021 4.53  3.8 - 5.2 10e12/L Final     Hemoglobin 03/23/2021 13.8  11.7 - 15.7 g/dL Final     Hematocrit 03/23/2021 41.8  35.0 - 47.0 % Final     MCV 03/23/2021 92  78 - 100 fl Final     MCH 03/23/2021 30.5  26.5 - 33.0 pg Final     MCHC 03/23/2021 33.0  31.5 - 36.5 g/dL Final     RDW 03/23/2021 13.0  10.0 - 15.0 % Final     Platelet Count 03/23/2021 304  150 - 450 10e9/L Final     % Neutrophils 03/23/2021 86.5  % Final     % Lymphocytes 03/23/2021 9.4  % Final     % Monocytes 03/23/2021 3.8  % Final     % Eosinophils 03/23/2021 0.1  % Final     % Basophils 03/23/2021 0.2  % Final     Absolute Neutrophil 03/23/2021 12.2* 1.6 - 8.3 10e9/L Final     Absolute Lymphocytes 03/23/2021 1.3  0.8 - 5.3 10e9/L Final     Absolute Monocytes 03/23/2021 0.5  0.0 - 1.3 10e9/L Final     Absolute Eosinophils 03/23/2021 0.0  0.0 - 0.7 10e9/L Final     Absolute Basophils 03/23/2021 0.0  0.0 - 0.2 10e9/L Final     Diff Method 03/23/2021 Automated Method   Final     WBC Urine 03/23/2021 0 - 5  OTO5^0 - 5 /HPF Final     RBC Urine 03/23/2021 10-25* OTO2^O - 2 /HPF Final     Squamous Epithelial /LPF Urine 03/23/2021 Few  FEW^Few /LPF Final     Bacteria Urine 03/23/2021 Few* NEG^Negative /HPF Final         Video-Visit Details    Type of service:  Video Visit    Video End Time:12:01 PM    Originating Location (pt. Location): Home    Distant Location (provider location):  [facility]     Platform [facility] for Video Visit: [facility]

## 2021-04-26 ENCOUNTER — DOCUMENTATION ONLY (OUTPATIENT)
Dept: LAB | Facility: CLINIC | Age: 61
End: 2021-04-26

## 2021-04-26 DIAGNOSIS — E83.52 HYPERCALCEMIA: Primary | ICD-10-CM

## 2021-04-28 DIAGNOSIS — E83.52 HYPERCALCEMIA: ICD-10-CM

## 2021-04-28 PROCEDURE — 36415 COLL VENOUS BLD VENIPUNCTURE: CPT | Performed by: INTERNAL MEDICINE

## 2021-04-28 PROCEDURE — 82330 ASSAY OF CALCIUM: CPT | Performed by: INTERNAL MEDICINE

## 2021-04-28 PROCEDURE — 80048 BASIC METABOLIC PNL TOTAL CA: CPT | Performed by: INTERNAL MEDICINE

## 2021-04-29 LAB
ANION GAP SERPL CALCULATED.3IONS-SCNC: 6 MMOL/L (ref 3–14)
BUN SERPL-MCNC: 16 MG/DL (ref 7–30)
CA-I SERPL ISE-MCNC: 5.7 MG/DL (ref 4.4–5.2)
CALCIUM SERPL-MCNC: 10.7 MG/DL (ref 8.5–10.1)
CHLORIDE SERPL-SCNC: 108 MMOL/L (ref 94–109)
CO2 SERPL-SCNC: 26 MMOL/L (ref 20–32)
CREAT SERPL-MCNC: 0.99 MG/DL (ref 0.52–1.04)
GFR SERPL CREATININE-BSD FRML MDRD: 61 ML/MIN/{1.73_M2}
GLUCOSE SERPL-MCNC: 98 MG/DL (ref 70–99)
POTASSIUM SERPL-SCNC: 3.7 MMOL/L (ref 3.4–5.3)
SODIUM SERPL-SCNC: 140 MMOL/L (ref 133–144)

## 2021-05-03 NOTE — PROGRESS NOTES
Pre-Visit Planning     Appointment Notes for this encounter:   med follow up     Questionnaires Reviewed/Assigned  No additional questionnaires are needed    Spoke with patient. Med rec completed. Pt states not taking semaglutide due to cost. Was $800 after insurance covered their part. Pt insurance does not cover if use for wt loss. We discussed looking into co-pay assistance programs from  and/or calling insurance to see what alternatives would be covered so she can provide those options at visit to discuss. Labs already completed 4/28/21.    Lacy XIE RN

## 2021-05-05 ENCOUNTER — VIRTUAL VISIT (OUTPATIENT)
Dept: PEDIATRICS | Facility: CLINIC | Age: 61
End: 2021-05-05
Payer: COMMERCIAL

## 2021-05-05 DIAGNOSIS — E83.52 HYPERCALCEMIA: ICD-10-CM

## 2021-05-05 DIAGNOSIS — I10 ESSENTIAL HYPERTENSION: ICD-10-CM

## 2021-05-05 DIAGNOSIS — E66.01 MORBID OBESITY (H): Primary | ICD-10-CM

## 2021-05-05 DIAGNOSIS — R73.01 IMPAIRED FASTING GLUCOSE: ICD-10-CM

## 2021-05-05 PROCEDURE — 99214 OFFICE O/P EST MOD 30 MIN: CPT | Mod: 95 | Performed by: INTERNAL MEDICINE

## 2021-05-05 RX ORDER — PHENTERMINE HYDROCHLORIDE 30 MG/1
30 CAPSULE ORAL EVERY MORNING
Qty: 30 CAPSULE | Refills: 3 | Status: SHIPPED | OUTPATIENT
Start: 2021-05-05 | End: 2021-05-05

## 2021-05-05 RX ORDER — PHENTERMINE HYDROCHLORIDE 30 MG/1
30 CAPSULE ORAL EVERY MORNING
Qty: 30 CAPSULE | Refills: 3 | Status: SHIPPED | OUTPATIENT
Start: 2021-05-05 | End: 2021-06-09

## 2021-05-05 NOTE — PROGRESS NOTES
Katharina is a 61 year old who is being evaluated via a billable video visit.      How would you like to obtain your AVS? Mail a copy  If the video visit is dropped, the invitation should be resent by: Text to cell phone: [phone]  Will anyone else be joining your video visit? No    Video Start Time: 08:21    Assessment & Plan     1. Morbid obesity (H)  Unable to afford semaglutide tablets. Discussed injections. Plan continue phentermine and topiramate. Recent visit virtual but she reports her weight is coming down  - phentermine (ADIPEX-P) 30 MG capsule; Take 1 capsule (30 mg) by mouth every morning  Dispense: 30 capsule; Refill: 3    2. Hypercalcemia  Reviewed results and further evaluation, as well as possible implications. Plan outpatient follow up lab.  - Protein electrophoresis; Future  - Protein electrophoresis, timed urine; Future  - HC KAPPA LAMDA FREE LIGHT CHAINS; Future    3. Impaired fasting glucose      4. Essential hypertension  Reasonable control, recheck at next in office visit.      39 minutes spent on the date of the encounter doing chart review, history and exam, documentation and further activities per the note       See Patient Instructions    Return in about 4 months (around 9/5/2021) for Preventive Visit - lab visit a few days before.    Ayla Graf MD  [facility] Posadas is a 61 year old who presents for the following health issues     HPI     Follow up on labs. Elevated calcium.    Pt states not taking semaglutide due to cost. Was $800 after insurance covered their part. Pt insurance does not cover if use for wt loss.  Taking phentermine           Hypertension Follow-up      Do you check your blood pressure regularly outside of the clinic? Yes     Are you following a low salt diet? Yes    Are your blood pressures ever more than 140 on the top number (systolic) OR more   than 90 on the bottom number (diastolic), for example 140/90?  No      Review of Systems   Constitutional, HEENT, cardiovascular, pulmonary, gi and gu systems are negative, except as otherwise noted.      Objective           Vitals:  No vitals were obtained today due to virtual visit.    Physical Exam   GENERAL: Healthy, alert and no distress  EYES: Eyes grossly normal to inspection.  No discharge or erythema, or obvious scleral/conjunctival abnormalities.  RESP: No audible wheeze, cough, or visible cyanosis.  No visible retractions or increased work of breathing.    SKIN: Visible skin clear. No significant rash, abnormal pigmentation or lesions.  NEURO: Cranial nerves grossly intact.  Mentation and speech appropriate for age.  PSYCH: Mentation appears normal, affect normal/bright, judgement and insight intact, normal speech and appearance well-groomed.    Orders Only on 04/28/2021   Component Date Value Ref Range Status     Sodium 04/28/2021 140  133 - 144 mmol/L Final     Potassium 04/28/2021 3.7  3.4 - 5.3 mmol/L Final     Chloride 04/28/2021 108  94 - 109 mmol/L Final     Carbon Dioxide 04/28/2021 26  20 - 32 mmol/L Final     Anion Gap 04/28/2021 6  3 - 14 mmol/L Final     Glucose 04/28/2021 98  70 - 99 mg/dL Final     Urea Nitrogen 04/28/2021 16  7 - 30 mg/dL Final     Creatinine 04/28/2021 0.99  0.52 - 1.04 mg/dL Final     GFR Estimate 04/28/2021 61  >60 mL/min/[1.73_m2] Final    Comment: Non  GFR Calc  Starting 12/18/2018, serum creatinine based estimated GFR (eGFR) will be   calculated using the Chronic Kidney Disease Epidemiology Collaboration   (CKD-EPI) equation.       GFR Estimate If Black 04/28/2021 70  >60 mL/min/[1.73_m2] Final    Comment:  GFR Calc  Starting 12/18/2018, serum creatinine based estimated GFR (eGFR) will be   calculated using the Chronic Kidney Disease Epidemiology Collaboration   (CKD-EPI) equation.       Calcium 04/28/2021 10.7* 8.5 - 10.1 mg/dL Final     Calcium Ionized 04/28/2021 5.7* 4.4 - 5.2 mg/dL Final        Video-Visit Details    Type of service:  Video Visit    Video End Time:09:09    Originating Location (pt. Location): Home    Distant Location (provider location):  [facility]     Platform used for Video Visit: Miguelangel

## 2021-05-26 DIAGNOSIS — E83.52 HYPERCALCEMIA: ICD-10-CM

## 2021-05-26 DIAGNOSIS — R12 HEARTBURN: Primary | ICD-10-CM

## 2021-05-26 RX ORDER — SPIRONOLACTONE 50 MG/1
50 TABLET, FILM COATED ORAL DAILY
Qty: 90 TABLET | Refills: 1 | Status: SHIPPED | OUTPATIENT
Start: 2021-05-26 | End: 2021-09-27

## 2021-05-26 NOTE — TELEPHONE ENCOUNTER
Routing refill request to provider for review/approval because:  Drug interaction warning: Tadeo

## 2021-05-26 NOTE — TELEPHONE ENCOUNTER
Please call her. Refill sent for spironolactone. We had talked about doing further lab evaluation for her elevated calcium level, but on thinking about this further, I think it would probably be more cost effective for her to have a consult virtually with Dr. Betty Louis, a surgeon who specializes in hyperparathyroidism evaluation and treatment options.   Ayla Graf M.D.

## 2021-05-27 NOTE — TELEPHONE ENCOUNTER
Placed call to patient. Went over Dr. Graf's note below.    Patient wanted to relay that her family has history of Paget's Disease. Her father had this. She is wondering if this could have anything to do with her elevate calcium levels.    Patient also wanted to relay that on 3/31 she was seen in  for back pain. Patient reports they thought she may have had a kidney stone that had passed.    Based on the above, patient is wondering if referral is still recommended next step?    Will route to Dr. Graf to review/advise- If referral is still next step, please review pended referral and sign if appropriate. Please route back and I will update patient.    Lacy XIE RN

## 2021-06-02 DIAGNOSIS — I10 ESSENTIAL HYPERTENSION: ICD-10-CM

## 2021-06-03 RX ORDER — FAMOTIDINE 20 MG/1
20 TABLET, FILM COATED ORAL 2 TIMES DAILY
Qty: 60 TABLET | Refills: 3 | Status: SHIPPED | OUTPATIENT
Start: 2021-06-03

## 2021-06-03 NOTE — TELEPHONE ENCOUNTER
Called and spoke with patient and relayed note from Dr. Graf below.     Patient did state that since ranitidine has been recalled, she has been having heartburn a lot. She relies on 3 Tums per day most days- not every day. She is wondering if this could increase the calcium levels?    She reports taking OTC Prilosec when she knows that she is going to eat spicy foods or onions.    Patient wanted provider to just know the above in the event that it could cause calcium elevations.    Offered to give number for patient to call to schedule with Dr. MARSH. Patient declined stating she will wait for them to call her. She should get a call within 2 business days.     Will route update to Dr. Graf.    Lacy XIE RN

## 2021-06-03 NOTE — TELEPHONE ENCOUNTER
I called and spoke with patient and went over note from Dr. Graf below. Patient verbalized understanding- will stop Tums, trial famotidine (will call if does not help after 2 weeks) and will have labs checked on 6/15- scheduled to see Dr. MARSH on 6/16.    No further questions at this time.    Lacy XIE RN

## 2021-06-03 NOTE — TELEPHONE ENCOUNTER
I agree, it shouldn't cause elevated calcium levels. I do want her to stop taking them though, until we get her calcium sorted out.  In the meanwhile, can take pepcid 20 mg bid. I'll send rx for this. If she still has heartburn >2x/week, should switch to omeprazole 20 mg. Can get OCT or let me know and I will send rx.    Also, pls let her know that I did touch base with Dr. Louis. She would like to meet with her and order imaging tests as needed. She did suggest repeating the PTH and another calcium prior to seeing her. Then we can also see if tums made a difference after a few weeks off it. Lab just needs to be done at least by 1-2 days prior to visit with Dr. MARSH so she has results. nonfasting lab.  Ayla Graf M.D.

## 2021-06-03 NOTE — TELEPHONE ENCOUNTER
On reviewing Paget's disease findings, usually the serum calcium level is normal and if it's elevated, we look for parathyroid problems even with Paget's disease.   Would still want her to see Dr. Louis. I'll sign referral. She will need to call for appointment. Let her know I'll send a heads-up to Dr. EMMA Graf M.D.

## 2021-06-04 RX ORDER — LOSARTAN POTASSIUM 50 MG/1
TABLET ORAL
Qty: 90 TABLET | Refills: 1 | Status: SHIPPED | OUTPATIENT
Start: 2021-06-04 | End: 2021-08-18 | Stop reason: DRUGHIGH

## 2021-06-08 DIAGNOSIS — E66.01 MORBID OBESITY (H): ICD-10-CM

## 2021-06-09 RX ORDER — PHENTERMINE HYDROCHLORIDE 30 MG/1
CAPSULE ORAL
Qty: 30 CAPSULE | Refills: 1 | Status: SHIPPED | OUTPATIENT
Start: 2021-06-09 | End: 2021-08-18 | Stop reason: SINTOL

## 2021-06-15 DIAGNOSIS — E83.52 HYPERCALCEMIA: ICD-10-CM

## 2021-06-15 PROCEDURE — 83883 ASSAY NEPHELOMETRY NOT SPEC: CPT | Mod: 59 | Performed by: INTERNAL MEDICINE

## 2021-06-15 PROCEDURE — 83970 ASSAY OF PARATHORMONE: CPT | Performed by: INTERNAL MEDICINE

## 2021-06-15 PROCEDURE — 36415 COLL VENOUS BLD VENIPUNCTURE: CPT | Performed by: INTERNAL MEDICINE

## 2021-06-15 PROCEDURE — 82310 ASSAY OF CALCIUM: CPT | Performed by: INTERNAL MEDICINE

## 2021-06-15 PROCEDURE — 84165 PROTEIN E-PHORESIS SERUM: CPT | Performed by: PATHOLOGY

## 2021-06-15 PROCEDURE — 83883 ASSAY NEPHELOMETRY NOT SPEC: CPT | Performed by: INTERNAL MEDICINE

## 2021-06-15 PROCEDURE — 99N1036 PR STATISTIC TOTAL PROTEIN: Performed by: INTERNAL MEDICINE

## 2021-06-16 ENCOUNTER — VIRTUAL VISIT (OUTPATIENT)
Dept: SURGERY | Facility: CLINIC | Age: 61
End: 2021-06-16
Payer: COMMERCIAL

## 2021-06-16 VITALS — HEIGHT: 67 IN | BODY MASS INDEX: 45.99 KG/M2 | WEIGHT: 293 LBS

## 2021-06-16 DIAGNOSIS — E83.52 HYPERCALCEMIA: Primary | ICD-10-CM

## 2021-06-16 LAB
CALCIUM SERPL-MCNC: 10.1 MG/DL (ref 8.5–10.1)
PTH-INTACT SERPL-MCNC: 56 PG/ML (ref 18–80)

## 2021-06-16 PROCEDURE — 99243 OFF/OP CNSLTJ NEW/EST LOW 30: CPT | Mod: 95 | Performed by: SURGERY

## 2021-06-16 ASSESSMENT — MIFFLIN-ST. JEOR: SCORE: 2035.07

## 2021-06-16 NOTE — LETTER
"2021    RE: Katharina Mejias, : 1960      Video Consult:  History of Present Illness:  Katharina Mejias is a 61 year old female who is sent by Dr Ayla Graf for evaluation of hypercalcemia. She had an elevated calcium found on screening bloodwork.  She has a calcium level as high as 11.3.   She has had the elevated calcium for at least  6 months, intermittantly.  She has constitutional symptoms of fatigue and difficulty concentrating.  She has a possible history of kidney stones.   She has had bony fractures as a child. A DEXA scan shows not done yet.     There is no family history of parathyroid disease.  Katharina has no prior neck surgery..  She is not on thyroid medications.      Smoking History:  Smokes 6-8 cigerettes a day  benefits of quitting     Physical Exam:  LMP 2007 (LMP Unknown)   Height 5' 7\"  Weight 318 pounds, BMI 49.81  HEENT:  Normocephalic, atraumatic.  Neck:   Short and thick.              Range of motion is normal.  Neurologic:  Alert.  Speech is clear.       Labs:  Calcium-9.4-11.3  PTH-56 - 59      Imaging:    Sestimibi Scan with SPECT-Will order after repeating labs     Assessment and Plan:  Katharina appears to have primary hyperparathyroidism we will repeat her blood work one more time.  Assuming it is consistent with parathyroid disease, will obtain imaging studies and then review them with her in an in person visit.  That way we can discuss the operation more easily.   I anticipate getting a Dual isotope Sestamibi scan with SPECT, possible 4 D CT.         Betty Louis MD    "

## 2021-06-16 NOTE — PROGRESS NOTES
"Video Consult:  History of Present Illness:  Katharina Mejias is a 61 year old female who is sent by Dr Ayla Graf for evaluation of hypercalcemia. She had an elevated calcium found on screening bloodwork.  She has a calcium level as high as 11.3.   She has had the elevated calcium for at least  6 months, intermittantly.  She has constitutional symptoms of fatigue and difficulty concentrating.  She has a possible history of kidney stones.   She has had bony fractures as a child. A DEXA scan shows not done yet.    There is no family history of parathyroid disease.  Katharina has no prior neck surgery..  She is not on thyroid medications.      Past Medical History:   Diagnosis Date     Acute and subacute iridocyclitis, unspecified 8/05, 4/06     Ankylosing spondylitis (H)      Lumbago      Other and unspecified ovarian cyst 1985     Other cataract      Tobacco use disorder      Past Surgical History:   Procedure Laterality Date     NO HISTORY OF SURGERY       Allergies   Allergen Reactions     Chantix [Varenicline]      Suicidal thoughts     Mold      Vomiting and diarrhea      Pcn [Penicillins]      Smoking History:  smokes 6-8 cigerettes a day  benefits of quitting    Physical Exam:  LMP 07/09/2007 (LMP Unknown)   Height 5' 7\"  Weight 318 pounds, BMI 49.81  HEENT:  Normocephalic, atraumatic.  Neck:   Short and thick.              Range of motion is normal.  Neurologic:  Alert.  Speech is clear.       Labs:  Calcium-9.4-11.3  PTH-56 - 59      Imaging:    Sestimibi Scan with SPECT-Will order after repeating labs    Assessment and Plan:  Katharina appears to have primary hyperparathyroidism we will repeat her blood work one more time.  Assuming it is consistent with parathyroid disease, will obtain imaging studies and then review them with her in an in person visit.  That way we can discuss the operation more easily.   I anticipate getting a Dual isotope Sestamibi scan with SPECT, possible 4 D CT.    Betty" Heriberto NARANJO    Please route or send letter to:  Primary Care Provider (PCP).      40 minutes total time spent on the date of this encounter doing:  Chart review, review of test results, patient visit, physical exam, education, counseling, developing plan of care, and documenting.      .

## 2021-06-17 LAB
ALBUMIN SERPL ELPH-MCNC: 3.6 G/DL (ref 3.7–5.1)
ALPHA1 GLOB SERPL ELPH-MCNC: 0.3 G/DL (ref 0.2–0.4)
ALPHA2 GLOB SERPL ELPH-MCNC: 0.9 G/DL (ref 0.5–0.9)
B-GLOBULIN SERPL ELPH-MCNC: 0.9 G/DL (ref 0.6–1)
GAMMA GLOB SERPL ELPH-MCNC: 1 G/DL (ref 0.7–1.6)
KAPPA LC UR-MCNC: 2.78 MG/DL (ref 0.33–1.94)
KAPPA LC/LAMBDA SER: 1.07 {RATIO} (ref 0.26–1.65)
LAMBDA LC SERPL-MCNC: 2.59 MG/DL (ref 0.57–2.63)
M PROTEIN SERPL ELPH-MCNC: 0 G/DL
PROT PATTERN SERPL ELPH-IMP: ABNORMAL

## 2021-06-23 ENCOUNTER — TELEPHONE (OUTPATIENT)
Dept: SURGERY | Facility: CLINIC | Age: 61
End: 2021-06-23

## 2021-06-23 DIAGNOSIS — E21.0 PRIMARY HYPERPARATHYROIDISM (H): Primary | ICD-10-CM

## 2021-06-23 NOTE — TELEPHONE ENCOUNTER
SESTAMIBI CT SCAN w/ SPECT DUAL ISOTOPE    Date: 6-29-21  Time: 11:00 AM  Location: [place] [address] [place]  [address]        Please check in at the [facility] lobby at 10:45 AM

## 2021-06-23 NOTE — LETTER
[facility]    [address]  [place], [place] [address]  Phone [phone]  Fax [phone] [address], [address]  [place] [place] [address]  Phone [phone]  Fax [phone]    [email or web address]           Katharina Mejias    You have been scheduled for:    DUAL ISOTOPE SESTAMIBI SCAN WITH CT SPECT     Date: 6-29-21  Time: 11:00 AM     Location: [facility]   [address] [place]  [address]          Please check in at  10:45 AM

## 2021-06-29 ENCOUNTER — HOSPITAL ENCOUNTER (OUTPATIENT)
Dept: NUCLEAR MEDICINE | Facility: CLINIC | Age: 61
Setting detail: NUCLEAR MEDICINE
End: 2021-06-29
Attending: SURGERY
Payer: COMMERCIAL

## 2021-06-29 DIAGNOSIS — E21.0 PRIMARY HYPERPARATHYROIDISM (H): ICD-10-CM

## 2021-06-29 PROCEDURE — 78072 PARATHYRD PLANAR W/SPECT&CT: CPT

## 2021-06-29 PROCEDURE — A9516 IODINE I-123 SOD IODIDE MIC: HCPCS | Performed by: SURGERY

## 2021-06-29 PROCEDURE — A9500 TC99M SESTAMIBI: HCPCS | Performed by: SURGERY

## 2021-06-29 PROCEDURE — 343N000001 HC RX 343: Performed by: SURGERY

## 2021-06-29 RX ADMIN — Medication 26.3 MILLICURIE: at 13:03

## 2021-06-29 RX ADMIN — Medication 795 UCI.: at 11:01

## 2021-07-04 ENCOUNTER — HEALTH MAINTENANCE LETTER (OUTPATIENT)
Age: 61
End: 2021-07-04

## 2021-07-15 ENCOUNTER — TELEPHONE (OUTPATIENT)
Dept: PEDIATRICS | Facility: CLINIC | Age: 61
End: 2021-07-15

## 2021-07-15 NOTE — TELEPHONE ENCOUNTER
Please call her. I see that her NM parathyroid scan looks normal. I've re-reviewed all of her labs, etc. The only things abnormal are the calcium and very mild elevation of kappa light chains. These are very reassuring results but a complicated discussion of what it means that the calcium is persistently mildly abnormal so I'd like to set up a phone or video visit.  Ayla Graf M.D.

## 2021-07-18 ENCOUNTER — MYC MEDICAL ADVICE (OUTPATIENT)
Dept: SURGERY | Facility: CLINIC | Age: 61
End: 2021-07-18

## 2021-07-20 ENCOUNTER — TELEPHONE (OUTPATIENT)
Dept: PEDIATRICS | Facility: CLINIC | Age: 61
End: 2021-07-20

## 2021-07-20 NOTE — TELEPHONE ENCOUNTER
Called patient.     Per Dr. Graf telephone encounter on 7/15/21, patient needs a visit to discuss the recent labs from 6/15/21 in depth.    Visit with Dr. Graf is currently scheduled for 8/18/21.    Informed patient of the information above, and answered clarifying questions.     Patient has further questions regarding the recent scan on 6/29/21 plan going forward.   Advised patient to follow up with Dr. Louis who ordered the scan.    Patient verbalized understanding and reports no more questions at this time.   Patient reports that she will follow up with Dr. Louis regarding questions about scan on 6/29/21 and plan.  Patient plans to keep scheduled visit with Dr. Graf on 8/18/21.     Christos Madden RN on 7/20/2021 at 4:43 PM

## 2021-07-20 NOTE — TELEPHONE ENCOUNTER
General Call:     Who is calling:  Patient    Reason for Call:  Patient would like to know why she needs a video visit on 8/18/21 with Dr. Graf. She is confused. She would like an explanation and also why she needs a lab appt on 9/1/21. (Her px was rescheduled from 9/7/21 to 10/4/21 because provider not available) She also says that Dr. Graf needs to talk to Dr. Louis and find out what is going on with her.    What are your questions or concerns:  Stated above. Patient would like a call back from Dr. Graf or the RN.    Date of last appointment with provider: 5/5/21    Okay to leave a detailed message:Yes at Home number on file [phone] (home)

## 2021-07-28 ENCOUNTER — TELEPHONE (OUTPATIENT)
Dept: SURGERY | Facility: CLINIC | Age: 61
End: 2021-07-28

## 2021-07-28 DIAGNOSIS — E21.0 PRIMARY HYPERPARATHYROIDISM (H): Primary | ICD-10-CM

## 2021-07-28 NOTE — TELEPHONE ENCOUNTER
Called patient to schedule a 4D CT Scan for parathyroid as recommended by Dr. Louis.   The patient does not want to schedule that at this time.  I will notify Dr. Louis

## 2021-08-18 ENCOUNTER — VIRTUAL VISIT (OUTPATIENT)
Dept: PEDIATRICS | Facility: CLINIC | Age: 61
End: 2021-08-18
Payer: COMMERCIAL

## 2021-08-18 DIAGNOSIS — E78.5 HYPERLIPIDEMIA LDL GOAL <130: ICD-10-CM

## 2021-08-18 DIAGNOSIS — I10 ESSENTIAL HYPERTENSION: ICD-10-CM

## 2021-08-18 DIAGNOSIS — E83.52 HYPERCALCEMIA: ICD-10-CM

## 2021-08-18 DIAGNOSIS — E66.01 MORBID OBESITY (H): Primary | ICD-10-CM

## 2021-08-18 DIAGNOSIS — E66.01 MORBID OBESITY (H): ICD-10-CM

## 2021-08-18 PROCEDURE — 99215 OFFICE O/P EST HI 40 MIN: CPT | Mod: 95 | Performed by: INTERNAL MEDICINE

## 2021-08-18 NOTE — PROGRESS NOTES
"Carl is a 61 year old who is being evaluated via a billable video visit.      How would you like to obtain your AVS? MyChart  If the video visit is dropped, the invitation should be resent by: Text to cell phone: [phone]  Will anyone else be joining your video visit? No    Video Start Time: 8:44 AM    Assessment & Plan     Morbid obesity (H)  Continues on topamax. Phentermine seems to be raising her blood pressures. Discussed other options, not interested in weight management program, but would like to try ozempic. rx sent. Discussed potential side effects/benefits.   - topiramate (TOPAMAX) 50 MG tablet; Take 1 tablet (50 mg) by mouth 2 times daily  - semaglutide (OZEMPIC) 2 MG/1.5ML SOPN pen; Inject 0.25 mg Subcutaneous every 7 days for 7 days, THEN 0.5 mg every 7 days.    Essential hypertension  See patient instructions section.  - losartan (COZAAR) 100 MG tablet; Take 1 tablet (100 mg) by mouth daily    Hypercalcemia  Long discussion regarding this today. Reviewed her past history with this, potential causes, and her lab and imaging results. Discussed that this is a difficult diagnosis when imaging not helpful. Most recent lab normal. She is wondering if it could have taken 9 months for hydrochlorothiazide to wear out of her system. Will recheck before her next visit with me.  - Calcium; Future  - Ionized calcium, whole blood; Future    Hyperlipidemia LDL goal <130    - Hemoglobin A1c; Future  - Lipid panel reflex to direct LDL Fasting; Future      60 minutes spent on the date of the encounter doing chart review, history and exam, documentation and further activities per the note       Tobacco Cessation:   reports that she has been smoking cigarettes. She has never used smokeless tobacco.      BMI:   Estimated body mass index is 49.81 kg/m  as calculated from the following:    Height as of 6/16/21: 1.702 m (5' 7\").    Weight as of 6/16/21: 144.2 kg (318 lb).   Weight management plan: see plan    Patient " Instructions   Stop phentermine.  Watch blood pressure over the next week or two. As long as it is coming down and within 2 weeks is consistently less than 130/80, we can continue at 50 mg losartan. If it is elevated, increase to 100 mg.    I'll watch for any issues with semaglutide.      No follow-ups on file.    Ayla Graf MD  [facility]    Subjective   Carl is a 61 year old who presents for the following health issues    HPI     Discuss lab results which were collected on 6/15/2021. Discuss possible anemia due to recent diagnosis of anemia in both her sisters.     Increased phentermine and now is noticing her blood pressure is 150-170's in the evenings. In the mornings right away blood pressure is 137/73 pulse 75 this morning.     Review hypercalcemia and most recent labs. Review visits with and recommendations from Dr. Louis. Carl is feeling frustrated with hypercalcemia and is not interested in doing another scan. We talked about her frustrations with getting called to schedule a test she didn't want to do.     Hypertension Follow-up      Do you check your blood pressure regularly outside of the clinic? Yes     Are you following a low salt diet? Yes    Are your blood pressures ever more than 140 on the top number (systolic) OR more   than 90 on the bottom number (diastolic), for example 140/90? Yes      Review of Systems   Constitutional, HEENT, cardiovascular, pulmonary, gi and gu systems are negative, except as otherwise noted.      Objective           Vitals:  No vitals were obtained today due to virtual visit.    Physical Exam   GENERAL: Healthy, alert and no distress  EYES: Eyes grossly normal to inspection.  No discharge or erythema, or obvious scleral/conjunctival abnormalities.  RESP: No audible wheeze, cough, or visible cyanosis.  No visible retractions or increased work of breathing.    SKIN: Visible skin clear. No significant rash, abnormal pigmentation or  lesions.  NEURO: Cranial nerves grossly intact.  Mentation and speech appropriate for age.  PSYCH: Mentation appears normal, affect normal/bright, judgement and insight intact, normal speech and appearance well-groomed.    Orders Only on 06/15/2021   Component Date Value Ref Range Status     Calcium 06/15/2021 10.1  8.5 - 10.1 mg/dL Final     Parathyroid Hormone Intact 06/15/2021 56  18 - 80 pg/mL Final     Albumin Fraction 06/15/2021 3.6* 3.7 - 5.1 g/dL Final     Alpha 1 Fraction 06/15/2021 0.3  0.2 - 0.4 g/dL Final     Alpha 2 Fraction 06/15/2021 0.9  0.5 - 0.9 g/dL Final     Beta Fraction 06/15/2021 0.9  0.6 - 1.0 g/dL Final     Gamma Fraction 06/15/2021 1.0  0.7 - 1.6 g/dL Final     Monoclonal Peak 06/15/2021 0.0  0.0 g/dL Final     ELP Interpretation: 06/15/2021    Final                    Value:Essentially normal electrophoretic pattern. No obvious monoclonal proteins seen.   Pathologic significance requires clinical correlation. CRISTEL Segura M.D., Ph.D.,   Pathologist.       Kappa Free Lt Chain 06/15/2021 2.78* 0.33 - 1.94 mg/dL Final     Lambda Free Lt Chain 06/15/2021 2.59  0.57 - 2.63 mg/dL Final     Kappa Lambda Ratio 06/15/2021 1.07  0.26 - 1.65 Final       Video-Visit Details    Type of service:  Video Visit    Video End Time: 9:46    Originating Location (pt. Location): Home    Distant Location (provider location):  [facility]     Platform used for Video Visit: [employer]

## 2021-08-18 NOTE — PATIENT INSTRUCTIONS
Stop phentermine.  Watch blood pressure over the next week or two. As long as it is coming down and within 2 weeks is consistently less than 130/80, we can continue at 50 mg losartan. If it is elevated, increase to 100 mg.    I'll watch for any issues with semaglutide.

## 2021-08-20 RX ORDER — TOPIRAMATE 50 MG/1
TABLET, FILM COATED ORAL
Qty: 60 TABLET | Refills: 11 | OUTPATIENT
Start: 2021-08-20

## 2021-08-20 NOTE — TELEPHONE ENCOUNTER
Should have refills on file E-Prescribing Status: Receipt confirmed by pharmacy (11/11/2020 11:05 AM CST)

## 2021-08-25 DIAGNOSIS — E66.01 MORBID OBESITY (H): ICD-10-CM

## 2021-08-25 RX ORDER — TOPIRAMATE 50 MG/1
50 TABLET, FILM COATED ORAL 2 TIMES DAILY
Qty: 60 TABLET | Refills: 11 | OUTPATIENT
Start: 2021-08-25

## 2021-08-25 NOTE — TELEPHONE ENCOUNTER
Pending Prescriptions:                       Disp   Refills    topiramate (TOPAMAX) 50 MG tablet         60 tab*11           Sig: Take 1 tablet (50 mg) by mouth 2 times daily

## 2021-08-25 NOTE — TELEPHONE ENCOUNTER
Patient should have medication until 11/2021. Last filled   60 tablet 11 11/11/2020     Júnior TAYLOR RN

## 2021-08-25 NOTE — TELEPHONE ENCOUNTER
Patient call requesting refill of topiramate (TOPAMAX) 50 MG tablet and losartan (COZAAR) 50 MG tablet. Refill request placed for topiramate (TOPAMAX) 50 MG tablet. After chart review it appears that osartan (COZAAR) 50 MG tablet was discontinued. CHG will Huddle with provider when they get back about osartan (COZAAR) 50 MG tablet refill. Patient is currently taking this medication and has about 2 weeks left.    Elier Wallace at 12:28 PM on 8/25/2021  [facility] Guide  Phone [phone]

## 2021-09-07 ENCOUNTER — TELEPHONE (OUTPATIENT)
Dept: PEDIATRICS | Facility: CLINIC | Age: 61
End: 2021-09-07
Payer: COMMERCIAL

## 2021-09-07 DIAGNOSIS — E66.01 MORBID OBESITY (H): ICD-10-CM

## 2021-09-07 NOTE — TELEPHONE ENCOUNTER
Patient call requesting refill of Losartan (COZAR) 50mg tablets.     Bps have been running around 125/75 with a pulse of 75 for the past week.     Has been running above 130/80 in the evening, takes BP meds in the morning.    Last night was 145/85 pulse of 92 after being out and walking round, had been sitting for about an hour after activity when taking this BP.    Patient is fine with 50mg    1 pill of losartan left for tomorrow.    Elier Wallace at 1:43 PM on 9/7/2021  [facility] Health Guide  Phone [phone]

## 2021-09-07 NOTE — TELEPHONE ENCOUNTER
Patient calling for refill of topiramate (TOPAMAX) 50mg tablet. [facility] and was [facility] has no more refills on file for them.    Elier Wallace at 1:52 PM on 9/7/2021  [facility] Health Guide  Phone [phone]

## 2021-09-08 NOTE — TELEPHONE ENCOUNTER
Patient call back to make sure refill would go to [facility].    Elier Wallace at 1:52 PM on 9/8/2021  [facility] Health Guide  Phone [phone]

## 2021-09-09 ENCOUNTER — MYC MEDICAL ADVICE (OUTPATIENT)
Dept: PEDIATRICS | Facility: CLINIC | Age: 61
End: 2021-09-09

## 2021-09-09 DIAGNOSIS — I10 ESSENTIAL HYPERTENSION: Primary | ICD-10-CM

## 2021-09-09 RX ORDER — LOSARTAN POTASSIUM 25 MG/1
50 TABLET ORAL DAILY
Qty: 90 TABLET | Refills: 1 | Status: CANCELLED | OUTPATIENT
Start: 2021-09-09

## 2021-09-09 NOTE — CONFIDENTIAL NOTE
Pt called. Out of RX for 2 days now. She called Tuesday, 9/7/21, and NEEDS TODAY!  She said her BPs have < 130/80. So doing fine on the 50 mg Losartan. Would like to stay on this dose.   MYRTLE Dixon

## 2021-09-09 NOTE — TELEPHONE ENCOUNTER
"  Patient is requesting a refill of Losartan.    Per Dr. Graf visit note from 8/18/21:  \"Watch blood pressure over the next week or two. As long as it is coming down and within 2 weeks is consistently less than 130/80, we can continue at 50 mg losartan. If it is elevated, increase to 100 mg.\"    There is currently not an active prescription for Losartan.    Per telephone encounter from 9/7/21:  \"Bps have been running around 125/75 with a pulse of 75 for the past week.    Has been running above 130/80 in the evening, takes BP meds in the morning.   Last night was 145/85 pulse of 92 after being out and walking round, had been sitting for about an hour after activity when taking this BP.  Patient is fine with 50mg. 1 pill of losartan left for tomorrow.\"    Today 9/9/21, patient reports that blood pressure is currently 175/94.     Routing to Dr. Graf:  Ok for Losartan 100 mg script as discussed in visit on 8/18/21?  Or remain on 50 mg Losartan?  50 mg Losartan prescription pended for Dr. Graf signature.     Christos Madden RN on 9/9/2021 at 2:26 PM    "

## 2021-09-09 NOTE — TELEPHONE ENCOUNTER
Called and spoke with [facility]. They state that in August they received electronic  notification to stop topiramate prescription. Upon review of patient's chart patient was told to hold phentamine prescription in August. [facility] did not receive electronic notification to stop phentamine prescription.     Routing refill request to provider for review/approval because:  Labs out of range:  CBC    Erika Seugra RN on 9/9/2021 at 11:14 AM

## 2021-09-10 RX ORDER — TOPIRAMATE 50 MG/1
50 TABLET, FILM COATED ORAL 2 TIMES DAILY
Qty: 60 TABLET | Refills: 11 | Status: SHIPPED | OUTPATIENT
Start: 2021-09-10 | End: 2021-09-10

## 2021-09-10 RX ORDER — LOSARTAN POTASSIUM 50 MG/1
50 TABLET ORAL DAILY
Qty: 90 TABLET | Refills: 1 | Status: SHIPPED | OUTPATIENT
Start: 2021-09-10 | End: 2021-10-04 | Stop reason: DRUGHIGH

## 2021-09-10 RX ORDER — TOPIRAMATE 50 MG/1
50 TABLET, FILM COATED ORAL 2 TIMES DAILY
Qty: 180 TABLET | Refills: 3 | Status: SHIPPED | OUTPATIENT
Start: 2021-09-10 | End: 2021-10-04

## 2021-09-10 NOTE — TELEPHONE ENCOUNTER
Please let her know this was done.  [facility] She may want to transfer a 1 month rx from [facility] to a local pharmacy of if early today, I can send another rx.  Ayla Graf M.D.

## 2021-09-10 NOTE — TELEPHONE ENCOUNTER
Patient call asking for status of Losartan refill. Says her Bps have been running higher then usual since being off of medications. This morning she was 165/94, is checking bp at time of call and is 144/82 with Hr of 64. Yesterday evening had a bp of 196/106. CHG will huddle with providers for recommendations.    Elier Wallace at 9:29 AM on 9/10/2021  [facility] Health Guide  Phone [phone]

## 2021-09-10 NOTE — TELEPHONE ENCOUNTER
Multiple encounters on this issue. Please enter pharmacy, I can't find easily. I [facility]

## 2021-09-19 RX ORDER — LOSARTAN POTASSIUM 100 MG/1
100 TABLET ORAL DAILY
Qty: 90 TABLET | Refills: 1 | Status: SHIPPED | OUTPATIENT
Start: 2021-09-19 | End: 2022-05-03

## 2021-09-19 RX ORDER — TOPIRAMATE 50 MG/1
50 TABLET, FILM COATED ORAL 2 TIMES DAILY
Qty: 180 TABLET | Refills: 11 | Status: SHIPPED | OUTPATIENT
Start: 2021-09-19 | End: 2021-10-04

## 2021-09-27 ENCOUNTER — LAB (OUTPATIENT)
Dept: LAB | Facility: CLINIC | Age: 61
End: 2021-09-27
Payer: COMMERCIAL

## 2021-09-27 DIAGNOSIS — E83.52 HYPERCALCEMIA: ICD-10-CM

## 2021-09-27 DIAGNOSIS — E78.5 HYPERLIPIDEMIA LDL GOAL <130: ICD-10-CM

## 2021-09-27 LAB
CA-I BLD-MCNC: 5.3 MG/DL (ref 4.4–5.2)
HBA1C MFR BLD: 5.7 % (ref 0–5.6)

## 2021-09-27 PROCEDURE — 82330 ASSAY OF CALCIUM: CPT

## 2021-09-27 PROCEDURE — 80061 LIPID PANEL: CPT

## 2021-09-27 PROCEDURE — 83036 HEMOGLOBIN GLYCOSYLATED A1C: CPT

## 2021-09-27 PROCEDURE — 82310 ASSAY OF CALCIUM: CPT

## 2021-09-27 PROCEDURE — 36415 COLL VENOUS BLD VENIPUNCTURE: CPT

## 2021-09-27 RX ORDER — SPIRONOLACTONE 50 MG/1
50 TABLET, FILM COATED ORAL DAILY
Qty: 90 TABLET | Refills: 1 | Status: SHIPPED | OUTPATIENT
Start: 2021-09-27 | End: 2021-10-04

## 2021-09-27 NOTE — TELEPHONE ENCOUNTER
Routing refill request to provider for review/approval because:  Drug interaction warning    Erika Segura RN on 9/27/2021 at 11:59 AM

## 2021-09-27 NOTE — TELEPHONE ENCOUNTER
Patient calling to check up on status of refill for Kelechi. Says pharmacy was supposed to call last Monday (09/20/2021) and request a refill. No record of pharmacy contacting clinic for refill. Patient says they are now completely out. Refill pended and routed to refill pool.    Elier Wallace at 8:30 AM on 9/27/2021  [facility] Health Guide  Phone [phone]

## 2021-09-27 NOTE — TELEPHONE ENCOUNTER
Pending Prescriptions:                       Disp   Refills    spironolactone (ALDACTONE) 50 MG tablet   90 tab*1            Sig: Take 1 tablet (50 mg) by mouth daily

## 2021-09-28 LAB
CALCIUM SERPL-MCNC: 9.8 MG/DL (ref 8.5–10.1)
CHOLEST SERPL-MCNC: 139 MG/DL
FASTING STATUS PATIENT QL REPORTED: YES
HDLC SERPL-MCNC: 39 MG/DL
LDLC SERPL CALC-MCNC: 71 MG/DL
NONHDLC SERPL-MCNC: 100 MG/DL
TRIGL SERPL-MCNC: 144 MG/DL

## 2021-09-30 ASSESSMENT — ENCOUNTER SYMPTOMS
WEAKNESS: 0
HEMATOCHEZIA: 0
NAUSEA: 0
JOINT SWELLING: 0
ABDOMINAL PAIN: 0
SORE THROAT: 0
PARESTHESIAS: 0
MYALGIAS: 0
EYE PAIN: 0
BREAST MASS: 0
FREQUENCY: 0
CONSTIPATION: 0
SHORTNESS OF BREATH: 0
CHILLS: 0
DIZZINESS: 0
COUGH: 0
ARTHRALGIAS: 0
HEMATURIA: 0
HEARTBURN: 0
PALPITATIONS: 0
DIARRHEA: 0
HEADACHES: 0
NERVOUS/ANXIOUS: 0
FEVER: 0
DYSURIA: 0

## 2021-10-04 ENCOUNTER — OFFICE VISIT (OUTPATIENT)
Dept: PEDIATRICS | Facility: CLINIC | Age: 61
End: 2021-10-04
Payer: COMMERCIAL

## 2021-10-04 ENCOUNTER — MYC MEDICAL ADVICE (OUTPATIENT)
Dept: PEDIATRICS | Facility: CLINIC | Age: 61
End: 2021-10-04

## 2021-10-04 VITALS
OXYGEN SATURATION: 97 % | RESPIRATION RATE: 16 BRPM | TEMPERATURE: 97.1 F | SYSTOLIC BLOOD PRESSURE: 130 MMHG | DIASTOLIC BLOOD PRESSURE: 70 MMHG | HEART RATE: 72 BPM

## 2021-10-04 DIAGNOSIS — I89.0 LYMPHEDEMA: ICD-10-CM

## 2021-10-04 DIAGNOSIS — M45.9 ANKYLOSING SPONDYLITIS, UNSPECIFIED SITE OF SPINE (H): ICD-10-CM

## 2021-10-04 DIAGNOSIS — E78.5 DYSLIPIDEMIA: ICD-10-CM

## 2021-10-04 DIAGNOSIS — Z12.11 SCREEN FOR COLON CANCER: ICD-10-CM

## 2021-10-04 DIAGNOSIS — E83.52 HYPERCALCEMIA: ICD-10-CM

## 2021-10-04 DIAGNOSIS — I10 ESSENTIAL HYPERTENSION: ICD-10-CM

## 2021-10-04 DIAGNOSIS — I89.0 LYMPHEDEMA ASSOCIATED WITH OBESITY: ICD-10-CM

## 2021-10-04 DIAGNOSIS — Z00.00 ROUTINE GENERAL MEDICAL EXAMINATION AT A HEALTH CARE FACILITY: Primary | ICD-10-CM

## 2021-10-04 DIAGNOSIS — E66.813 CLASS 3 SEVERE OBESITY DUE TO EXCESS CALORIES WITH SERIOUS COMORBIDITY AND BODY MASS INDEX (BMI) OF 45.0 TO 49.9 IN ADULT (H): ICD-10-CM

## 2021-10-04 DIAGNOSIS — E66.01 CLASS 3 SEVERE OBESITY DUE TO EXCESS CALORIES WITH SERIOUS COMORBIDITY AND BODY MASS INDEX (BMI) OF 45.0 TO 49.9 IN ADULT (H): ICD-10-CM

## 2021-10-04 DIAGNOSIS — E66.9 LYMPHEDEMA ASSOCIATED WITH OBESITY: ICD-10-CM

## 2021-10-04 PROCEDURE — 90472 IMMUNIZATION ADMIN EACH ADD: CPT | Performed by: INTERNAL MEDICINE

## 2021-10-04 PROCEDURE — 99396 PREV VISIT EST AGE 40-64: CPT | Mod: 25 | Performed by: INTERNAL MEDICINE

## 2021-10-04 PROCEDURE — 90682 RIV4 VACC RECOMBINANT DNA IM: CPT | Performed by: INTERNAL MEDICINE

## 2021-10-04 PROCEDURE — 99214 OFFICE O/P EST MOD 30 MIN: CPT | Mod: 25 | Performed by: INTERNAL MEDICINE

## 2021-10-04 PROCEDURE — 90471 IMMUNIZATION ADMIN: CPT | Performed by: INTERNAL MEDICINE

## 2021-10-04 PROCEDURE — 90750 HZV VACC RECOMBINANT IM: CPT | Performed by: INTERNAL MEDICINE

## 2021-10-04 RX ORDER — SPIRONOLACTONE 50 MG/1
50 TABLET, FILM COATED ORAL DAILY
Qty: 90 TABLET | Refills: 1 | Status: SHIPPED | OUTPATIENT
Start: 2021-10-04 | End: 2022-03-24

## 2021-10-04 RX ORDER — ROSUVASTATIN CALCIUM 5 MG/1
5 TABLET, COATED ORAL DAILY
Qty: 90 TABLET | Refills: 3 | Status: SHIPPED | OUTPATIENT
Start: 2021-10-04 | End: 2022-01-01

## 2021-10-04 ASSESSMENT — ENCOUNTER SYMPTOMS
NERVOUS/ANXIOUS: 0
PARESTHESIAS: 0
COUGH: 0
MYALGIAS: 0
DIARRHEA: 0
HEMATOCHEZIA: 0
HEADACHES: 0
CONSTIPATION: 0
WEAKNESS: 0
PALPITATIONS: 0
SHORTNESS OF BREATH: 0
CHILLS: 0
NAUSEA: 0
ABDOMINAL PAIN: 0
HEARTBURN: 0
FEVER: 0
FREQUENCY: 0
JOINT SWELLING: 0
BREAST MASS: 0
DIZZINESS: 0
HEMATURIA: 0
SORE THROAT: 0
EYE PAIN: 0
ARTHRALGIAS: 0
DYSURIA: 0

## 2021-10-04 NOTE — PATIENT INSTRUCTIONS
Preventive Health Recommendations  Female Ages 50 - 64    Yearly exam: See your health care provider every year in order to  o Review health changes.   o Discuss preventive care.    o Review your medicines if your doctor has prescribed any.      Get a Pap test every three years (unless you have an abnormal result and your provider advises testing more often).    If you get Pap tests with HPV test, you only need to test every 5 years, unless you have an abnormal result.     You do not need a Pap test if your uterus was removed (hysterectomy) and you have not had cancer.    You should be tested each year for STDs (sexually transmitted diseases) if you're at risk.     Have a mammogram every 1 to 2 years.    Have a colonoscopy at age 50, or have a yearly FIT test (stool test). These exams screen for colon cancer.      Have a cholesterol test every 5 years, or more often if advised.    Have a diabetes test (fasting glucose) every three years. If you are at risk for diabetes, you should have this test more often.     If you are at risk for osteoporosis (brittle bone disease), think about having a bone density scan (DEXA).    Shots: Get a flu shot each year. Get a tetanus shot every 10 years.    Nutrition:     Eat at least 5 servings of fruits and vegetables each day.    Eat whole-grain bread, whole-wheat pasta and brown rice instead of white grains and rice.    Get adequate Calcium and Vitamin D.     Lifestyle    Exercise at least 150 minutes a week (30 minutes a day, 5 days a week). This will help you control your weight and prevent disease.    Limit alcohol to one drink per day.    No smoking.     Wear sunscreen to prevent skin cancer.     See your dentist every six months for an exam and cleaning.    See your eye doctor every 1 to 2 years.    Increase losartan to 100 mg daily.    Take ozempic 0.25 mg once weekly for 4 weeks, then 0.5 mg weekly.    OK to restart phentermine and take while in the first month of  Ozempic, then you can stop.    nonfasting lab in 1 month. Let me know how your blood pressures are looking.

## 2021-10-04 NOTE — PROGRESS NOTES
SUBJECTIVE:   CC: Katharina Mejias is an 61 year old woman who presents for preventive health visit.     Patient has been advised of split billing requirements and indicates understanding: Yes  Healthy Habits:     Getting at least 3 servings of Calcium per day:  NO    Bi-annual eye exam:  Yes    Dental care twice a year:  NO    Sleep apnea or symptoms of sleep apnea:  None    Diet:  Regular (no restrictions) and Low salt    Frequency of exercise:  1 day/week    Duration of exercise:  15-30 minutes    Taking medications regularly:  No    Medication side effects:  Other    PHQ-2 Total Score: 0    Additional concerns today:  Yes    Blood pressures lowest in AM at home, usually 130/80 lowest was 116 systolic. Usually higher later in day. Currently still on 50 mg losartan. Not currently taking phentermine.    Started ozempic last week. Did have 1 day of diarrhea after starting, but none since then.     Hyperparathyroidism evaluation reviewed with her. Last calcium normal with minimally elevated ionized calcium. Will continue to monitor.    Reviewed ankylosing spondylitis. She has not required treatment for this, no significant symptoms.     Lymphedema still an issue, not particularly worse but felt hydrochlorothiazide worked better to control this. On low dose spironolactone but not helpful. Difficult for her to elevate her legs effectively as she works long hours at a desk job.    Today's PHQ-2 Score:   PHQ-2 ( 1999 Pf[employer]) 9/30/2021   Q1: Little interest or pleasure in doing things 0   Q2: Feeling down, depressed or hopeless 0   PHQ-2 Score 0   Q1: Little interest or pleasure in doing things Not at all   Q2: Feeling down, depressed or hopeless Not at all   PHQ-2 Score 0       Abuse: Current or Past (Physical, Sexual or Emotional) - No  Do you feel safe in your environment? Yes        Social History     Tobacco Use     Smoking status: Current Every Day Smoker     Packs/day: 0.50     Years: 40.00     Pack years:  20.00     Types: Cigarettes     Start date: 1/1/1971     Smokeless tobacco: Never Used   Substance Use Topics     Alcohol use: Yes     Comment: rarely       Alcohol Use 9/30/2021   Prescreen: >3 drinks/day or >7 drinks/week? Not Applicable   Prescreen: >3 drinks/day or >7 drinks/week? -       Reviewed orders with patient.  Reviewed health maintenance and updated orders accordingly - Yes  Labs reviewed in EPIC    Breast Cancer Screening:    Breast CA Risk Assessment (FHS-7) 9/30/2021   Do you have a family history of breast, colon, or ovarian cancer? No / Unknown     Mammogram Screening: Recommended mammography every 1-2 years with patient discussion and risk factor consideration  Pertinent mammograms are reviewed under the imaging tab.    History of abnormal Pap smear: NO - age 30-65 PAP every 5 years with negative HPV co-testing recommended  PAP / HPV Latest Ref Rng & Units 10/2/2017 5/22/2006   PAP (Historical) - NIL NIL   HPV16 NEG:Negative Negative -   HPV18 NEG:Negative Negative -   HRHPV NEG:Negative Negative -     Reviewed and updated as needed this visit by clinical staff  Tobacco  Allergies  Meds     Fam Hx          Reviewed and updated as needed this visit by Provider                    Review of Systems   Constitutional: Negative for chills and fever.   HENT: Negative for congestion, ear pain, hearing loss and sore throat.    Eyes: Negative for pain and visual disturbance.   Respiratory: Negative for cough and shortness of breath.    Cardiovascular: Positive for peripheral edema. Negative for chest pain and palpitations.   Gastrointestinal: Negative for abdominal pain, constipation, diarrhea, heartburn, hematochezia and nausea.   Breasts:  Negative for tenderness, breast mass and discharge.   Genitourinary: Negative for dysuria, frequency, genital sores, hematuria, pelvic pain, urgency, vaginal bleeding and vaginal discharge.   Musculoskeletal: Negative for arthralgias, joint swelling and myalgias.    Skin: Negative for rash.   Neurological: Negative for dizziness, weakness, headaches and paresthesias.   Psychiatric/Behavioral: Negative for mood changes. The patient is not nervous/anxious.           OBJECTIVE:   /70 (BP Location: Right arm, Patient Position: Chair, Cuff Size: Adult Large)   Pulse 72   Temp 97.1  F (36.2  C) (Tympanic)   Resp 16   LMP 07/09/2007 (LMP Unknown)   SpO2 97%   Physical Exam  GENERAL: healthy, alert and no distress  EYES: Eyes grossly normal to inspection  NECK: no adenopathy, no asymmetry, masses, or scars and thyroid normal to palpation  RESP: lungs clear to auscultation - no rales, rhonchi or wheezes  CV: regular rate and rhythm, normal S1 S2, no S3 or S4, no murmur, click or rub, 1+ pitting edema to mid tibia  ABDOMEN: soft, nontender, no hepatosplenomegaly, no masses and bowel sounds normal  MS: no gross musculoskeletal defects noted, no edema  SKIN: no suspicious lesions or rashes  NEURO: Normal strength and tone, mentation intact and speech normal  PSYCH: mentation appears normal, affect normal/bright    Diagnostic Test Results:  Labs reviewed in Epic    ASSESSMENT/PLAN:     1. Routine general medical examination at a health care facility      2. Class 3 severe obesity due to excess calories with serious comorbidity and body mass index (BMI) of 45.0 to 49.9 in adult (H)  On week 1 of semaglutide. Will continue for 3 more weeks on 0.25 mg dose, then increase to 0.5. will meet after that time to discuss possible increase in dose. While on first dose, which is subtherapeutic for weight management, did recommend she take her last month's supply of phentermine daily until increases dose.     3. Essential hypertension  Increase losartan to 100 mg and recheck lab in 1 month.  - **Basic metabolic panel FUTURE 2mo; Future    4. Hypercalcemia  Normal repeat calcium, ionized almost normal. Recheck in 6 months.   - spironolactone (ALDACTONE) 50 MG tablet; Take 1 tablet (50 mg)  "by mouth daily  Dispense: 90 tablet; Refill: 1    5. Dyslipidemia  On crestor and tolerating.  - rosuvastatin (CRESTOR) 5 MG tablet; Take 1 tablet (5 mg) by mouth daily  Dispense: 90 tablet; Refill: 3    6. Ankylosing spondylitis, unspecified site of spine (H)  See pt message on mychart.     7. Screen for colon cancer  Declines.    8. Lymphedema  Likely due to obesity. 1+ pitting today. She is not currently interested in lymphedema therapy, has difficulty keeping her legs elevated to an effective level, but does have a previous rx for Jobst stockings, which she would like to  and try. Discussed that weight loss will likely help with this.     Patient has been advised of split billing requirements and indicates understanding: Yes  COUNSELING:  Reviewed preventive health counseling, as reflected in patient instructions    Estimated body mass index is 49.81 kg/m  as calculated from the following:    Height as of 6/16/21: 1.702 m (5' 7\").    Weight as of 6/16/21: 144.2 kg (318 lb).    Weight management plan: as above    She reports that she has been smoking cigarettes. She started smoking about 50 years ago. She has a 20.00 pack-year smoking history. She has never used smokeless tobacco.      Counseling Resources:  ATP IV Guidelines  Pooled Cohorts Equation Calculator  Breast Cancer Risk Calculator  BRCA-Related Cancer Risk Assessment: FHS-7 Tool  FRAX Risk Assessment  ICSI Preventive Guidelines  Dietary Guidelines for Americans, 2010  USDA's MyPlate  ASA Prophylaxis  Lung CA Screening    Ayla Graf MD  [facility]  "

## 2021-11-08 ENCOUNTER — MYC MEDICAL ADVICE (OUTPATIENT)
Dept: PEDIATRICS | Facility: CLINIC | Age: 61
End: 2021-11-08
Payer: COMMERCIAL

## 2021-11-08 NOTE — TELEPHONE ENCOUNTER
"Per Patient Mychart message:  \"I saw great result while using both Ozempic and Phentermine together.  I was actually down to 312 pounds.    10 days of just the ozempic and I am back to 319.      Do you want me to continue with just Ozempic or should I get a refill on the phentermine.  I am willing to give it a full month with no phentermine, but if the results are not beneficial I will not be using the ozempic anymore.\"    Patient had success managing weight with ozempic and phentermine.   Since stopping the phentermine and increasing dose of Ozempic to 0.5 mg, patient has been gaining weight.     Routing to Dr. Graf:  -Ok to restart phentermine?   Or increase ozempic dose?    Christos Madden RN on 11/8/2021 at 12:13 PM    "

## 2021-11-09 DIAGNOSIS — E66.01 MORBID OBESITY (H): ICD-10-CM

## 2021-11-10 NOTE — TELEPHONE ENCOUNTER
The first month of ozempic is not therapeutic so I wouldn't expect much weight loss. We start with this dose to avoid side effects. It would be premature to give up on the ozempic. It's the 0.5 mg dose that first starts to show benefit and that may take some time, then we have to wait 4 weeks in between each dose increase, but the higher doses are generally more helpful. It may be that she needs to get to 1, 1.7, or 2.4 mg to see a better benefit. pls make sure she has follow up scheduled.  Ayla Graf M.D.

## 2021-11-15 RX ORDER — PHENTERMINE HYDROCHLORIDE 30 MG/1
CAPSULE ORAL
Qty: 30 CAPSULE | OUTPATIENT
Start: 2021-11-15

## 2021-11-22 ENCOUNTER — LAB (OUTPATIENT)
Dept: LAB | Facility: CLINIC | Age: 61
End: 2021-11-22
Payer: COMMERCIAL

## 2021-11-22 DIAGNOSIS — I10 ESSENTIAL HYPERTENSION: ICD-10-CM

## 2021-11-22 LAB
ANION GAP SERPL CALCULATED.3IONS-SCNC: 5 MMOL/L (ref 3–14)
BUN SERPL-MCNC: 15 MG/DL (ref 7–30)
CALCIUM SERPL-MCNC: 9.4 MG/DL (ref 8.5–10.1)
CHLORIDE BLD-SCNC: 109 MMOL/L (ref 94–109)
CO2 SERPL-SCNC: 27 MMOL/L (ref 20–32)
CREAT SERPL-MCNC: 0.88 MG/DL (ref 0.52–1.04)
GFR SERPL CREATININE-BSD FRML MDRD: 71 ML/MIN/1.73M2
GLUCOSE BLD-MCNC: 106 MG/DL (ref 70–99)
POTASSIUM BLD-SCNC: 4.2 MMOL/L (ref 3.4–5.3)
SODIUM SERPL-SCNC: 141 MMOL/L (ref 133–144)

## 2021-11-22 PROCEDURE — 36415 COLL VENOUS BLD VENIPUNCTURE: CPT

## 2021-11-22 PROCEDURE — 80048 BASIC METABOLIC PNL TOTAL CA: CPT

## 2021-12-26 DIAGNOSIS — E66.01 MORBID OBESITY (H): ICD-10-CM

## 2021-12-27 RX ORDER — PHENTERMINE HYDROCHLORIDE 30 MG/1
CAPSULE ORAL
Qty: 30 CAPSULE | OUTPATIENT
Start: 2021-12-27

## 2021-12-27 NOTE — TELEPHONE ENCOUNTER
Called patient.     Per patient:  Still taking Ozempic as 0.5 mg.   On January 1st the Ozempic will cost hundreds of dollars per fill.   Ozempic for weight loss is not covered, even with PA unless it is for diabetes rather than weight loss.   Need a different medication than Ozempic for weight loss.     Phentermine was working well.     Patient reports that Dr. Graf does not need a visit with her until 6 months from now.     Routing to Dr. Graf.   -Recommend going back on Phentermine?  Or other medication?    Christos Madden RN on 12/27/2021 at 4:10 PM

## 2021-12-27 NOTE — TELEPHONE ENCOUNTER
Routing refill request to provider for review/approval because:  Drug not on the [facility] refill protocol   Drug not active on patient's medication list    Erika Segura RN on 12/27/2021 at 7:36 AM

## 2021-12-27 NOTE — TELEPHONE ENCOUNTER
Routing to Dr. Graf:  -appears that there is no active prescription for Ozempic.     Which dose of Ozempic do we want the patient taking now?    Christos Madden RN on 12/27/2021 at 1:41 PM

## 2021-12-27 NOTE — TELEPHONE ENCOUNTER
pls call her. Last plan was to do 1 month of phentermine which she had at home. Would be better to focus on increasing semaglutide dose.

## 2021-12-29 NOTE — TELEPHONE ENCOUNTER
Sent [employer] message to patient reiterating provider message below.  Raissa SCHERER RN, BSN

## 2021-12-29 NOTE — TELEPHONE ENCOUNTER
Would have her call her insurance to check on Saxenda (liraglutide).  We don't have any recorded weights since starting Ozempic so unclear if this was helping.  Has been on and off phentermine for the last 2 years. We added ozempic as it seemed the phentermine was raising her blood pressures. I'd be very hesitant to restart given the blood pressure concern.    My other concern is that phentermine is not a long term medication. Restarting phentermine would only be a temporary bridge to a different treatment and I don't have others to offer her. We need to look at finding an option that she can realistically continue.     This has become so complicated from a cost and effectiveness standpoint that she needs to see a specialist in weight management. She has not been interested in weight management program in past but I think that's where she needs to go now. I'll place a referral. Please give her the number to call.    Ayla Graf M.D.

## 2022-01-01 ENCOUNTER — LAB (OUTPATIENT)
Dept: LAB | Facility: CLINIC | Age: 62
End: 2022-01-01
Payer: COMMERCIAL

## 2022-01-01 ENCOUNTER — TELEPHONE (OUTPATIENT)
Dept: PEDIATRICS | Facility: CLINIC | Age: 62
End: 2022-01-01

## 2022-01-01 ENCOUNTER — HEALTH MAINTENANCE LETTER (OUTPATIENT)
Age: 62
End: 2022-01-01

## 2022-01-01 ENCOUNTER — VIRTUAL VISIT (OUTPATIENT)
Dept: PEDIATRICS | Facility: CLINIC | Age: 62
End: 2022-01-01
Payer: COMMERCIAL

## 2022-01-01 ENCOUNTER — VIRTUAL VISIT (OUTPATIENT)
Dept: PHARMACY | Facility: CLINIC | Age: 62
End: 2022-01-01
Attending: INTERNAL MEDICINE
Payer: COMMERCIAL

## 2022-01-01 DIAGNOSIS — I89.0 LYMPHEDEMA ASSOCIATED WITH OBESITY: ICD-10-CM

## 2022-01-01 DIAGNOSIS — E78.5 DYSLIPIDEMIA: ICD-10-CM

## 2022-01-01 DIAGNOSIS — I10 ESSENTIAL HYPERTENSION: ICD-10-CM

## 2022-01-01 DIAGNOSIS — Z72.0 TOBACCO ABUSE: ICD-10-CM

## 2022-01-01 DIAGNOSIS — R73.03 PRE-DIABETES: ICD-10-CM

## 2022-01-01 DIAGNOSIS — I89.0 LYMPHEDEMA: ICD-10-CM

## 2022-01-01 DIAGNOSIS — R73.01 IMPAIRED FASTING GLUCOSE: ICD-10-CM

## 2022-01-01 DIAGNOSIS — Z71.85 VACCINE COUNSELING: ICD-10-CM

## 2022-01-01 DIAGNOSIS — E83.52 HYPERCALCEMIA: ICD-10-CM

## 2022-01-01 DIAGNOSIS — E66.01 CLASS 3 SEVERE OBESITY DUE TO EXCESS CALORIES WITH SERIOUS COMORBIDITY AND BODY MASS INDEX (BMI) OF 45.0 TO 49.9 IN ADULT (H): Primary | ICD-10-CM

## 2022-01-01 DIAGNOSIS — E66.813 CLASS 3 SEVERE OBESITY DUE TO EXCESS CALORIES WITH SERIOUS COMORBIDITY AND BODY MASS INDEX (BMI) OF 45.0 TO 49.9 IN ADULT (H): Primary | ICD-10-CM

## 2022-01-01 DIAGNOSIS — K21.00 GASTROESOPHAGEAL REFLUX DISEASE WITH ESOPHAGITIS WITHOUT HEMORRHAGE: ICD-10-CM

## 2022-01-01 DIAGNOSIS — M45.9 ANKYLOSING SPONDYLITIS, UNSPECIFIED SITE OF SPINE (H): ICD-10-CM

## 2022-01-01 DIAGNOSIS — E66.9 LYMPHEDEMA ASSOCIATED WITH OBESITY: ICD-10-CM

## 2022-01-01 DIAGNOSIS — Z78.9 TAKES DIETARY SUPPLEMENTS: ICD-10-CM

## 2022-01-01 DIAGNOSIS — Z87.898 HISTORY OF WHEEZING: ICD-10-CM

## 2022-01-01 LAB
ALBUMIN SERPL BCG-MCNC: 3.8 G/DL (ref 3.5–5.2)
ALP SERPL-CCNC: 103 U/L (ref 35–104)
ALT SERPL W P-5'-P-CCNC: 20 U/L (ref 10–35)
ANION GAP SERPL CALCULATED.3IONS-SCNC: 9 MMOL/L (ref 7–15)
AST SERPL W P-5'-P-CCNC: 23 U/L (ref 10–35)
BILIRUB SERPL-MCNC: 0.4 MG/DL
BUN SERPL-MCNC: 20.5 MG/DL (ref 8–23)
CALCIUM SERPL-MCNC: 9.8 MG/DL (ref 8.8–10.2)
CHLORIDE SERPL-SCNC: 106 MMOL/L (ref 98–107)
CHOLEST SERPL-MCNC: 154 MG/DL
CREAT SERPL-MCNC: 0.88 MG/DL (ref 0.51–0.95)
DEPRECATED HCO3 PLAS-SCNC: 26 MMOL/L (ref 22–29)
ERYTHROCYTE [DISTWIDTH] IN BLOOD BY AUTOMATED COUNT: 13 % (ref 10–15)
GFR SERPL CREATININE-BSD FRML MDRD: 73 ML/MIN/1.73M2
GLUCOSE SERPL-MCNC: 105 MG/DL (ref 70–99)
HBA1C MFR BLD: 5.7 % (ref 0–5.6)
HCT VFR BLD AUTO: 43.8 % (ref 35–47)
HDLC SERPL-MCNC: 40 MG/DL
HGB BLD-MCNC: 14.1 G/DL (ref 11.7–15.7)
LDLC SERPL CALC-MCNC: 90 MG/DL
MCH RBC QN AUTO: 30.7 PG (ref 26.5–33)
MCHC RBC AUTO-ENTMCNC: 32.2 G/DL (ref 31.5–36.5)
MCV RBC AUTO: 95 FL (ref 78–100)
NONHDLC SERPL-MCNC: 114 MG/DL
PLATELET # BLD AUTO: 244 10E3/UL (ref 150–450)
POTASSIUM SERPL-SCNC: 4.6 MMOL/L (ref 3.4–5.3)
PROT SERPL-MCNC: 6.4 G/DL (ref 6.4–8.3)
RBC # BLD AUTO: 4.59 10E6/UL (ref 3.8–5.2)
SODIUM SERPL-SCNC: 141 MMOL/L (ref 136–145)
TRIGL SERPL-MCNC: 120 MG/DL
WBC # BLD AUTO: 7.6 10E3/UL (ref 4–11)

## 2022-01-01 PROCEDURE — 99607 MTMS BY PHARM ADDL 15 MIN: CPT | Performed by: PHARMACIST

## 2022-01-01 PROCEDURE — 83036 HEMOGLOBIN GLYCOSYLATED A1C: CPT

## 2022-01-01 PROCEDURE — 85027 COMPLETE CBC AUTOMATED: CPT

## 2022-01-01 PROCEDURE — 80053 COMPREHEN METABOLIC PANEL: CPT

## 2022-01-01 PROCEDURE — 99605 MTMS BY PHARM NP 15 MIN: CPT | Performed by: PHARMACIST

## 2022-01-01 PROCEDURE — 36415 COLL VENOUS BLD VENIPUNCTURE: CPT

## 2022-01-01 PROCEDURE — 80061 LIPID PANEL: CPT

## 2022-01-01 PROCEDURE — 99214 OFFICE O/P EST MOD 30 MIN: CPT | Mod: 95 | Performed by: INTERNAL MEDICINE

## 2022-01-01 RX ORDER — ALBUTEROL SULFATE 90 UG/1
2 AEROSOL, METERED RESPIRATORY (INHALATION) EVERY 4 HOURS PRN
Qty: 18 G | Refills: 1 | Status: SHIPPED | OUTPATIENT
Start: 2022-01-01 | End: 2023-01-01

## 2022-01-01 RX ORDER — ROSUVASTATIN CALCIUM 5 MG/1
5 TABLET, COATED ORAL DAILY
Qty: 90 TABLET | Refills: 4 | Status: SHIPPED | OUTPATIENT
Start: 2022-01-01

## 2022-01-01 RX ORDER — SEMAGLUTIDE 1.34 MG/ML
INJECTION, SOLUTION SUBCUTANEOUS
Qty: 1.5 ML | Refills: 2 | Status: SHIPPED | OUTPATIENT
Start: 2022-01-01 | End: 2022-01-01

## 2022-01-01 RX ORDER — ROSUVASTATIN CALCIUM 5 MG/1
TABLET, COATED ORAL
Qty: 90 TABLET | Refills: 0 | Status: SHIPPED | OUTPATIENT
Start: 2022-01-01 | End: 2022-01-01

## 2022-01-01 RX ORDER — LOSARTAN POTASSIUM 100 MG/1
100 TABLET ORAL DAILY
Qty: 90 TABLET | Refills: 0 | Status: SHIPPED | OUTPATIENT
Start: 2022-01-01 | End: 2022-01-01

## 2022-01-01 RX ORDER — LOSARTAN POTASSIUM 100 MG/1
100 TABLET ORAL DAILY
Qty: 90 TABLET | Refills: 4 | Status: SHIPPED | OUTPATIENT
Start: 2022-01-01

## 2022-01-01 RX ORDER — SPIRONOLACTONE 50 MG/1
50 TABLET, FILM COATED ORAL DAILY
Qty: 90 TABLET | Refills: 0 | Status: SHIPPED | OUTPATIENT
Start: 2022-01-01 | End: 2022-01-01

## 2022-01-01 RX ORDER — SPIRONOLACTONE 50 MG/1
50 TABLET, FILM COATED ORAL DAILY
Qty: 90 TABLET | Refills: 4 | Status: SHIPPED | OUTPATIENT
Start: 2022-01-01

## 2022-01-03 NOTE — TELEPHONE ENCOUNTER
Called patient.     Not interested in referral to weight loss clinic.   Patient declined appt when Weight loss clinic called her to schedule.    Referral:  [facility] Phone: [phone].    Huddled with Dr. Graf:  Plan:   No more phentermine at this time.  Patient needs to see the Weight Loss Clinic for management.  Ok to finish Ozempic at 0.5 mg dose then stop.     Relayed above plan from Dr. Graf to the patient.    Patient verbalized understanding.   Patient declined going to weight loss clinic.    Christos Madden RN on 1/3/2022 at 4:18 PM

## 2022-01-07 ENCOUNTER — MYC MEDICAL ADVICE (OUTPATIENT)
Dept: PEDIATRICS | Facility: CLINIC | Age: 62
End: 2022-01-07
Payer: COMMERCIAL

## 2022-03-23 DIAGNOSIS — E83.52 HYPERCALCEMIA: ICD-10-CM

## 2022-03-24 RX ORDER — SPIRONOLACTONE 50 MG/1
TABLET, FILM COATED ORAL
Qty: 90 TABLET | Refills: 1 | Status: SHIPPED | OUTPATIENT
Start: 2022-03-24 | End: 2022-01-01

## 2022-04-10 ENCOUNTER — HEALTH MAINTENANCE LETTER (OUTPATIENT)
Age: 62
End: 2022-04-10

## 2022-05-01 DIAGNOSIS — I10 ESSENTIAL HYPERTENSION: ICD-10-CM

## 2022-05-03 NOTE — TELEPHONE ENCOUNTER
Routing refill request to provider for review/approval because:  Drug interaction warning    Júnior TAYLOR RN

## 2022-05-04 ENCOUNTER — MYC REFILL (OUTPATIENT)
Dept: PEDIATRICS | Facility: CLINIC | Age: 62
End: 2022-05-04
Payer: COMMERCIAL

## 2022-05-04 ENCOUNTER — MYC MEDICAL ADVICE (OUTPATIENT)
Dept: PEDIATRICS | Facility: CLINIC | Age: 62
End: 2022-05-04
Payer: COMMERCIAL

## 2022-05-04 DIAGNOSIS — I10 ESSENTIAL HYPERTENSION: ICD-10-CM

## 2022-05-04 RX ORDER — LOSARTAN POTASSIUM 100 MG/1
TABLET ORAL
Qty: 90 TABLET | Refills: 0 | Status: SHIPPED | OUTPATIENT
Start: 2022-05-04 | End: 2022-07-26

## 2022-05-05 RX ORDER — LOSARTAN POTASSIUM 100 MG/1
100 TABLET ORAL DAILY
Qty: 90 TABLET | Refills: 0 | OUTPATIENT
Start: 2022-05-05

## 2022-07-26 ENCOUNTER — MYC REFILL (OUTPATIENT)
Dept: PEDIATRICS | Facility: CLINIC | Age: 62
End: 2022-07-26

## 2022-07-26 DIAGNOSIS — I10 ESSENTIAL HYPERTENSION: ICD-10-CM

## 2022-08-01 ENCOUNTER — MYC MEDICAL ADVICE (OUTPATIENT)
Dept: PEDIATRICS | Facility: CLINIC | Age: 62
End: 2022-08-01

## 2022-08-01 RX ORDER — LOSARTAN POTASSIUM 100 MG/1
100 TABLET ORAL DAILY
Qty: 90 TABLET | Refills: 0 | Status: SHIPPED | OUTPATIENT
Start: 2022-08-01 | End: 2022-01-01

## 2022-08-02 NOTE — TELEPHONE ENCOUNTER
See MyChart encounter - patient is scheduled for an appointment.     Closing encounter.     Ally Rodríguez, [facility]

## 2022-08-02 NOTE — TELEPHONE ENCOUNTER
Called patient - states that she would prefer a virtual meeting due to Covid concerns. She will come in for labs if necessary after virtual visit. Patient does not have calendar on her at the moment and would like a phone call back in about an hour to schedule.     Ally Rodríguez, CMA

## 2022-09-29 NOTE — TELEPHONE ENCOUNTER
3 month victor hugo refill approved. Further refills will be addressed at scheduled visit.     Erika Segura RN on 9/29/2022 at 10:58 AM

## 2022-11-02 NOTE — TELEPHONE ENCOUNTER
Next 5 appointments (look out 90 days)    Nov 15, 2022  5:20 PM  (Arrive by 5:00 PM)  Provider Visit with Ayla Grfa MD  [facility] ([facility] ) [address] [place] [address]  [address] [place] [id number]  [phone]        Prescription approved per [facility] Refill Protocol.  Libby Hernandez RN

## 2022-11-15 NOTE — PROGRESS NOTES
Carl is a 62 year old who is being evaluated via a billable video visit.      How would you like to obtain your AVS? MyChart    Will anyone else be joining your video visit? No      Assessment & Plan     Class 3 severe obesity due to excess calories with serious comorbidity and body mass index (BMI) of 45.0 to 49.9 in adult (H)  Would like to discuss further medication options. Referral done. Not interested in weight management program.  - Med Therapy Management Referral    Hypercalcemia  Recheck.  - spironolactone (ALDACTONE) 50 MG tablet; Take 1 tablet (50 mg) by mouth daily  - Comprehensive metabolic panel (BMP + Alb, Alk Phos, ALT, AST, Total. Bili, TP); Future    Dyslipidemia  Tolerating statin  - rosuvastatin (CRESTOR) 5 MG tablet; Take 1 tablet (5 mg) by mouth daily  - Lipid panel reflex to direct LDL Fasting; Future    Essential hypertension  Blood pressures elevated at home. Discussed using good technique fore home measurement.  - rosuvastatin (CRESTOR) 5 MG tablet; Take 1 tablet (5 mg) by mouth daily  - losartan (COZAAR) 100 MG tablet; Take 1 tablet (100 mg) by mouth daily    Lymphedema  Quite sedentery, difficult to get her feet elevated above level of her heart. Has been trying to get more activity in.  - Compression Sleeve/Stocking Order for DME - ONLY FOR DME    Impaired fasting glucose    - Hemoglobin A1c; Future      30 minutes spent on the date of the encounter doing chart review, history and exam, documentation and further activities per the note       See Patient Instructions    Return in about 6 months (around 5/15/2023).    Ayla Graf MD  [facility]    Subjective   Carl is a 62 year old presenting for the following health issues:    Medication Follow-up      History of Present Illness       Hypertension: She presents for follow up of hypertension.  She does check blood pressure  regularly outside of the clinic. Outside blood pressures have been over 140/90. She  follows a low salt diet.         Review of Systems   Constitutional, HEENT, cardiovascular, pulmonary, gi and gu systems are negative, except as otherwise noted.      Objective           Vitals:  No vitals were obtained today due to virtual visit.    Physical Exam   GENERAL: Healthy, alert and no distress  EYES: Eyes grossly normal to inspection.  No discharge or erythema, or obvious scleral/conjunctival abnormalities.  RESP: No audible wheeze, cough, or visible cyanosis.  No visible retractions or increased work of breathing.    SKIN: Visible skin clear. No significant rash, abnormal pigmentation or lesions.  NEURO: Cranial nerves grossly intact.  Mentation and speech appropriate for age.  PSYCH: Mentation appears normal, affect normal/bright, judgement and insight intact, normal speech and appearance well-groomed.    Lab on 11/22/2021   Component Date Value Ref Range Status     Sodium 11/22/2021 141  133 - 144 mmol/L Final     Potassium 11/22/2021 4.2  3.4 - 5.3 mmol/L Final     Chloride 11/22/2021 109  94 - 109 mmol/L Final     Carbon Dioxide (CO2) 11/22/2021 27  20 - 32 mmol/L Final     Anion Gap 11/22/2021 5  3 - 14 mmol/L Final     Urea Nitrogen 11/22/2021 15  7 - 30 mg/dL Final     Creatinine 11/22/2021 0.88  0.52 - 1.04 mg/dL Final     Calcium 11/22/2021 9.4  8.5 - 10.1 mg/dL Final     Glucose 11/22/2021 106 (H)  70 - 99 mg/dL Final     GFR Estimate 11/22/2021 71  >60 mL/min/1.73m2 Final    As of July 11, 2021, eGFR is calculated by the CKD-EPI creatinine equation, without race adjustment. eGFR can be influenced by muscle mass, exercise, and diet. The reported eGFR is an estimation only and is only applicable if the renal function is stable.       Video-Visit Details    Video Start Time: 6:13    Type of service:  Video Visit    Video End Time:6:43    Originating Location (pt. Location): Home    Distant Location (provider location):  On-site    Platform used for Video Visit: [employer]

## 2022-11-16 NOTE — PATIENT INSTRUCTIONS
Katharina    It was great to see you today at your virtual visit. Here is a summary of our plan:    Our triage RN, Kayla, can be reached at [phone]. You can leave a message and she will get back to you.    Leave medications the same.    Watch your blood pressure at home, make sure to use good technique.    Fasting lab    [facility] pharmacy virtual visit.     Shots at your pharmacy, covid and flu    Take care and let me know if you have any questions.    Warm Regards,  Ayla Graf M.D.

## 2022-11-21 NOTE — PROGRESS NOTES
Medication Therapy Management (MTM) Encounter    ASSESSMENT:                            Medication Adherence/Access: cost is an issue, see below    Smoking cessation: Patient continues to use tobacco. Patient is not ready to quit using tobacco.     Class III Obesity (BMI 40 or more)/prediabetes: would like to retry filling Ozempic with her history of prediabetes to see if has better coverage.  If not covered then next step is to try Saxenda.  If she does not have a weight loss benefit with her insurance then could consider naltrexone in the future.  Recommended 500 calorie deficit with 150 minutes of exercise per week.  She is familiar with administration and storage of Ozempic, we reviewed side effects.    Hypertension/Edema: Patient is meeting blood pressure goal of < 130/80mmHg. Patient would benefit from the following changes - continued monitoring, we do not have up to date reading.  She is scheduled for future lab.  Would benefit from trying compression stockings.  Wonder if ibuprofen adds to leg swelling.    Hyperlipidemia: Pt will be due for fasting labs as scheduled in December.    GERD: Stable.    Ankylosing spondylitis: long term use of ibuprofen.  She is having labs drawn in December for renal and liver function, would like to monitor hemoglobin in CBC, added lab order    Supplements: stable    Wheezing:  Stable with albuterol use as needed, sending in refill today    Vaccines:  Candidate for Prevnar 20, COVID-19 bivalent booster, influenza vaccines, Shingrix second vaccine.      PLAN:                            Weight management:  1.  Start Ozempic 0.25mg once weekly on Saturdays for 4 weeks and then increase to 0.5mg once weekly.  We will see if covered under pre-diabetes and if not then our next step is Saxenda.  2.  Continue to eat healthy with your diet  3.  Great job being active 30 minutes most days of the week, keep this up!  4.  For cost of Ozempic, if covered but have high copay, check out the  "savings card at www.ozempic.com    Blood Pressure:  Please end me a mychart of your home blood pressure.    Leg Swelling:  Try compression stockings    With long term ibuprofen use, adding hemoglobin/complete blood count lab to monitor this in addition to the other labs you are having done in December.    Vaccines:  Great job setting up the  COVID-19 bivalent booster and influenza vaccines at [facility]!  Consider get the Shingrix 2nd vaccine and Prevnar 20 vaccine in the future.    Future considerations:smoking cessation, reduction of ibuprofen to see if helps leg swelling    Follow-up: Return in about 7 weeks (around 1/10/2023) for MTM Pharmacist Visit, video visit.    SUBJECTIVE/OBJECTIVE:                          Katharina Mejias is a 62 year old female contacted via secure video for an initial visit. She was referred to me from Ayla Graf for \"glp-1 or weight mgmt med options\".      Reason for visit: 1) weight management, GLP-1 review 2) spironolactone for swelling and not working.  She does not have a lot of time to talk today she reports    Allergies/ADRs: Reviewed in chart  Past Medical History: Reviewed in chart  Tobacco: She reports that she has been smoking cigarettes. She started smoking about 51 years ago. She has a 20.00 pack-year smoking history. She has never used smokeless tobacco.Nicotine/Tobacco Cessation Plan:   Information offered: Patient not interested at this time  6-8 per day right now  Alcohol: 1-3 beverages / twice a year      Medication Adherence/Access:   Medication barriers: Ozempic cost her $800 per month, she could not afford this.  The patient fills medications at [facility]: No [facility] or [facility]    Class III Obesity (BMI 40 or more)/prediabetes: Current medication(s) include: none   Nutrition/Eating Habits: Patient reports hard cooking at home, she had tried tracking what she is eating, and will watch that, she feels she knows what to do.  Eating more fruits " "and veggies.   Exercise/Activity: Patient reports she is trying to walk as much as she can, trying 30 minutes a day in short group, 5,000 steps and happy to get 3600 per day.   Failed medications include: Phentermine: doctors does not want her on this anymore since she was on this for year and Topiramate: had kidney stones  Naltrexone she has not tried  336.4 lb home weight today    Wt Readings from Last 4 Encounters:   06/16/21 318 lb (144.2 kg)   03/23/21 314 lb (142.4 kg)   12/23/19 326 lb 6.4 oz (148.1 kg)   10/07/19 (!) 341 lb 12.8 oz (155 kg)     Estimated body mass index is 49.81 kg/m  as calculated from the following:    Height as of 6/16/21: 5' 7\" (1.702 m).    Weight as of 6/16/21: 318 lb (144.2 kg).    Lab Results   Component Value Date    A1C 5.7 09/27/2021    A1C 6.0 12/14/2020    A1C 5.8 10/07/2019    A1C 5.6 06/26/2017     Hypertension/Edema: Current medications include losartan 100mg every day, spironolactone 50mg every day.  She continues to have leg swelling and worse when she works, better when she raises or elevates her legs. She has a prescription for compression stockings which has not picked up.  Patient does self-monitor blood pressure. Home BP monitoring can be higher at night, 153/86 but morning blood pressure are better, she is not taking every day  Patient reports no current medication side effects.  BP Readings from Last 3 Encounters:   10/04/21 130/70   03/23/21 116/64   03/16/20 124/64     Creatinine   Date Value Ref Range Status   11/22/2021 0.88 0.52 - 1.04 mg/dL Final   04/28/2021 0.99 0.52 - 1.04 mg/dL Final     Potassium   Date Value Ref Range Status   11/22/2021 4.2 3.4 - 5.3 mmol/L Final   04/28/2021 3.7 3.4 - 5.3 mmol/L Final     Hyperlipidemia: Current therapy includes rosuvastatin 5mg daily.  Patient reports no significant myalgias or other side effects.  Recent Labs   Lab Test 09/27/21  0719 08/05/20  0943   CHOL 139 140   HDL 39* 41*   LDL 71 60   TRIG 144 196*     GERD: " Current medications include: Pepcid (famotidine) 20mg twice daily. Patient reports no current symptoms.  Patient feels that current regimen is effective.    Ankylosing spondylitis: Current medication is ibuprofen 400mg three times daily. She has been taking for years.  No side effects reported.  She reports the rheumatologist said to stay with ibuprofen or try Enbrel which she does not want to take.  She got a second opinion from rheumatologist but wanted MRI which was too expensive.    Creatinine   Date Value Ref Range Status   11/22/2021 0.88 0.52 - 1.04 mg/dL Final   04/28/2021 0.99 0.52 - 1.04 mg/dL Final     Lab Results   Component Value Date    AST 17 08/05/2020     Lab Results   Component Value Date    ALT 30 08/05/2020     Hemoglobin   Date Value Ref Range Status   03/23/2021 13.8 11.7 - 15.7 g/dL Final   ]    Supplements: Currently taking multivitamin/mineral every day. No reported issues at this time. .    History of wheezing: Current medications: Short-Acting Bronchodilator: Albuterol MDI.   She uses this when wheezy after walking or with COPD flares  Patient is not experiencing side effects.   Patient reports the following symptoms: none..   Has spirometry been completed: No, no diagnosis of COPD    Most Recent Immunizations   Administered Date(s) Administered     COVID-19,PF,Eliud 04/28/2021     Influenza Quad, Recombinant, pf(RIV4) (Flublok) 10/04/2021     Influenza Vaccine IM > 6 months Valent IIV4 (Alfuria,Fluzone) 10/28/2020     Mantoux Tuberculin Skin Test 05/22/2006     TDAP Vaccine (Adacel) 10/02/2017     Zoster vaccine recombinant adjuvanted (SHINGRIX) 10/04/2021     Today's Vitals: LMP 07/09/2007 (LMP Unknown)   ----------------    I spent 23 minutes with this patient today. All changes were made via collaborative practice agreement with Ayla Graf MD. A copy of the visit note was provided to the patient's provider(s).    The patient was sent via [employer] a summary of these  recommendations.     Lacy Forde, PharmD BCPS  Medication Therapy Management Practitioner   #[phone]    Telemedicine Visit Details  Type of service:  Video Conference via [employer]  Start Time: 8:05am  End Time: 8:29 AM  Originating Location (pt. Location): Home  Distant Location (provider location):  On-site  Provider has received verbal consent for a visit from the patient? Yes     Medication Therapy Recommendations  No medication therapy recommendations to display

## 2022-11-22 NOTE — PATIENT INSTRUCTIONS
Recommendations from today's MTM visit:                                                    MTM (medication therapy management) is a service provided by a clinical pharmacist designed to help you get the most of out of your medicines.     Weight management:  1.  Start Ozempic 0.25mg once weekly on Saturdays for 4 weeks and then increase to 0.5mg once weekly.  We will see if covered under pre-diabetes and if not then our next step is Saxenda.  2.  Continue to eat healthy with your diet  3.  Great job being active 30 minutes most days of the week, keep this up!  4.  For cost of Ozempic, if covered but have high copay, check out the savings card at www.ozempic.com    Blood Pressure:  Please end me a mychart of your home blood pressure.    Leg Swelling:  Try compression stockings    With long term ibuprofen use, adding hemoglobin/complete blood count lab to monitor this in addition to the other labs you are having done in December.    Vaccines:  Great job setting up the  COVID-19 bivalent booster and influenza vaccines at [facility]!  Consider get the Shingrix 2nd vaccine and Prevnar 20 vaccine in the future.    Follow-up: Return in about 7 weeks (around 1/10/2023) for MTM Pharmacist Visit, video visit.  To schedule another MTM appointment, please call the clinic directly or you may call the MTM scheduling line at [phone] or toll-free at [phone].     My Clinical Pharmacist's contact information:                                                      It was a pleasure seeing you today!  Please feel free to contact me with any questions or concerns you have.      Lacy Forde, PharmD [facility]  Medication Therapy Management Practitioner   #[phone]    It was great to speak with you today.  I value your experience and would be very thankful for your time with providing feedback on our clinic survey. You may receive a survey via email or text message in the next few days.

## 2022-11-22 NOTE — TELEPHONE ENCOUNTER
Prior Authorization Retail Medication Request    Medication/Dose: albuterol (PROAIR HFA/PROVENTIL HFA/VENTOLIN HFA) 108 (90 Base) MCG/ACT inhaler  ICD code (if different than what is on RX):    Previously Tried and Failed:    Rationale:  History of wheezing [Z87.898]     Insurance Name:  [employer] Ph: [phone]   Insurance ID:  [id number]       Pharmacy Information (if different than what is on RX)  Name:    Phone:        Sally Bowers LPN

## 2022-11-22 NOTE — TELEPHONE ENCOUNTER
Prior Authorization Retail Medication Request    Medication/Dose: semaglutide (OZEMPIC) 2 MG/1.5ML SOPN pen  ICD code (if different than what is on RX):    Previously Tried and Failed:    Rationale:   Class 3 severe obesity due to excess calories with serious comorbidity and body mass index (BMI) of 45.0 to 49.9 in adult (H) [E66.01, Z68.42]  - Primary       Pre-diabetes [R73.03]             Insurance Name:  1280-[employer]ICA CHOICE Ph: [phone]   Insurance ID:  [id number]       Pharmacy Information (if different than what is on RX)  Name:    Phone:        Sally Bowers LPN

## 2022-11-25 NOTE — TELEPHONE ENCOUNTER
Central Prior Authorization Team   Phone: [phone]      PA NOT NEEDED    Medication: albuterol (PROAIR HFA/PROVENTIL HFA/VENTOLIN HFA) 108 (90 Base) MCG/ACT inhaler-PA NOT NEEDED  Insurance Company: [employer] - Phone [phone] Fax [phone]  Pharmacy Filling the Rx: [id number] [place], [place] - [address] AT [facility] & [facility]  Filling Pharmacy Phone: [phone]  Filling Pharmacy Fax:    Start Date: 11/25/2022    Called pharmacy to see what albuterol they are running, since there is a note saying to fill whatever insurance covers.  Per tech, she was able to find one that is covered.  **Instructed pharmacy to notify patient when script is ready to /ship.**

## 2022-11-25 NOTE — TELEPHONE ENCOUNTER
Central Prior Authorization Team   Phone: [phone]      PA Initiation    Medication: semaglutide (OZEMPIC) 2 MG/1.5ML SOPN pen  Insurance Company: [employer] - Phone [phone] Fax [phone]  Pharmacy Filling the Rx: [facility][id number] [place], [place] - [address] AT [facility] & [facility]  Filling Pharmacy Phone: [phone]  Filling Pharmacy Fax:    Start Date: 11/25/2022

## 2022-11-28 NOTE — TELEPHONE ENCOUNTER
PRIOR AUTHORIZATION DENIED    Medication: semaglutide (OZEMPIC) 2 MG/1.5ML SOPN pen-DENIED    Denial Date: 11/28/2022    Denial Rational:       Appeal Information:   Please provide labs showing patient has Type 2 DM

## 2022-11-30 NOTE — TELEPHONE ENCOUNTER
Prior Authorization Retail Medication Request    Medication/Dose: Saxenda 18mg/3ml  ICD code (if different than what is on RX):  E66.01, Z68.42  Previously Tried and Failed: Ozempic  Rationale:     Insurance Name:  [employer]  Insurance ID: [id number] [facility] [id number]      Pharmacy Information (if different than what is on RX)  Name:  [facility]   Phone:  [phone]

## 2022-12-01 NOTE — TELEPHONE ENCOUNTER
Prior Authorization Approval    Authorization Effective Date: 11/1/2022  Authorization Expiration Date: 3/31/2023  Medication: Saxenda 18mg/3ml  Approved Dose/Quantity:    Reference #:     Insurance Company: [employer] - Phone [phone] Fax [phone]  Expected CoPay:       CoPay Card Available:      Foundation Assistance Needed:    Which Pharmacy is filling the prescription (Not needed for infusion/clinic administered): [id number] [address] AT [address]  Pharmacy Notified: Yes  Patient Notified: Yes  **Instructed pharmacy to notify patient when script is ready to /ship.**

## 2022-12-01 NOTE — TELEPHONE ENCOUNTER
Called the patient at [phone]   - Informed the patient that a prior authorization was completed for the Ozempic medication and it was denied   - Informed the patient of Dr. Graf's comments/recommendations   - Patient states that she has spoken with Lacy Forde RPH regarding the prior authorization denial and states that Lacy Forde RPH sent in a prescription for Saxenda daily injections   - See 11/22/2022 My[facility] Medical Advice   - Patient has a follow-up MTM appointment scheduled for 1/10/2023 with Lacy Forde RPH     liraglutide - Weight Management (SAXENDA) 18 MG/3ML pen 15 mL 2 11/28/2022  --   Sig - Route: Inject 0.6 mg Subcutaneous daily Increase by 0.6mg weekly for a max daily dose of 3mg per day. - Subcutaneous     Routing to Dr. Graf as an FYI.     Shelley IVY, RN   Patient Advocate Liaison (PAL)  [facility]

## 2022-12-01 NOTE — TELEPHONE ENCOUNTER
She should see [facility] for help with this. Coverage has become very complicated.  Ayla Graf M.D.

## 2022-12-01 NOTE — TELEPHONE ENCOUNTER
Central Prior Authorization Team   Phone: [phone]    PA Initiation    Medication: Saxenda 18mg/3ml  Insurance Company: [employer] - Phone [phone] Fax [phone]  Pharmacy Filling the Rx: [facility] #[id number] [place], [place] - [address] AT [facility] & [facility]  Filling Pharmacy Phone: [phone]  Filling Pharmacy Fax:    Start Date: 12/1/2022

## 2023-01-01 ENCOUNTER — HOSPITAL ENCOUNTER (OUTPATIENT)
Facility: CLINIC | Age: 63
Setting detail: OBSERVATION
Discharge: HOME | End: 2023-08-17
Attending: EMERGENCY MEDICINE | Admitting: UROLOGY
Payer: COMMERCIAL

## 2023-01-01 ENCOUNTER — TELEPHONE (OUTPATIENT)
Dept: UROLOGY | Facility: CLINIC | Age: 63
End: 2023-01-01
Payer: COMMERCIAL

## 2023-01-01 ENCOUNTER — APPOINTMENT (OUTPATIENT)
Dept: CT IMAGING | Facility: CLINIC | Age: 63
End: 2023-01-01
Attending: EMERGENCY MEDICINE
Payer: COMMERCIAL

## 2023-01-01 ENCOUNTER — ANESTHESIA EVENT (OUTPATIENT)
Dept: SURGERY | Facility: CLINIC | Age: 63
End: 2023-01-01
Payer: COMMERCIAL

## 2023-01-01 ENCOUNTER — APPOINTMENT (OUTPATIENT)
Dept: GENERAL RADIOLOGY | Facility: CLINIC | Age: 63
End: 2023-01-01
Attending: INTERNAL MEDICINE
Payer: COMMERCIAL

## 2023-01-01 ENCOUNTER — TELEPHONE (OUTPATIENT)
Dept: PEDIATRICS | Facility: CLINIC | Age: 63
End: 2023-01-01

## 2023-01-01 ENCOUNTER — PATIENT OUTREACH (OUTPATIENT)
Dept: CARE COORDINATION | Facility: CLINIC | Age: 63
End: 2023-01-01
Payer: COMMERCIAL

## 2023-01-01 ENCOUNTER — HOSPITAL ENCOUNTER (EMERGENCY)
Facility: CLINIC | Age: 63
Discharge: HOME | End: 2023-08-15
Attending: EMERGENCY MEDICINE | Admitting: EMERGENCY MEDICINE
Payer: COMMERCIAL

## 2023-01-01 ENCOUNTER — VIRTUAL VISIT (OUTPATIENT)
Dept: PHARMACY | Facility: CLINIC | Age: 63
End: 2023-01-01
Payer: COMMERCIAL

## 2023-01-01 ENCOUNTER — OFFICE VISIT (OUTPATIENT)
Dept: PEDIATRICS | Facility: CLINIC | Age: 63
End: 2023-01-01
Payer: COMMERCIAL

## 2023-01-01 ENCOUNTER — LAB (OUTPATIENT)
Dept: LAB | Facility: CLINIC | Age: 63
End: 2023-01-01
Payer: COMMERCIAL

## 2023-01-01 ENCOUNTER — HOSPITAL ENCOUNTER (OUTPATIENT)
Facility: CLINIC | Age: 63
End: 2023-01-01
Attending: UROLOGY | Admitting: UROLOGY
Payer: COMMERCIAL

## 2023-01-01 ENCOUNTER — MYC REFILL (OUTPATIENT)
Dept: PEDIATRICS | Facility: CLINIC | Age: 63
End: 2023-01-01
Payer: COMMERCIAL

## 2023-01-01 ENCOUNTER — ANESTHESIA (OUTPATIENT)
Dept: SURGERY | Facility: CLINIC | Age: 63
End: 2023-01-01
Payer: COMMERCIAL

## 2023-01-01 ENCOUNTER — HEALTH MAINTENANCE LETTER (OUTPATIENT)
Age: 63
End: 2023-01-01

## 2023-01-01 ENCOUNTER — MYC MEDICAL ADVICE (OUTPATIENT)
Dept: PEDIATRICS | Facility: CLINIC | Age: 63
End: 2023-01-01
Payer: COMMERCIAL

## 2023-01-01 ENCOUNTER — HOSPITAL ENCOUNTER (EMERGENCY)
Facility: CLINIC | Age: 63
Discharge: HOME | End: 2023-09-06
Attending: EMERGENCY MEDICINE | Admitting: EMERGENCY MEDICINE
Payer: COMMERCIAL

## 2023-01-01 ENCOUNTER — TELEPHONE (OUTPATIENT)
Dept: PEDIATRICS | Facility: CLINIC | Age: 63
End: 2023-01-01
Payer: COMMERCIAL

## 2023-01-01 ENCOUNTER — ANCILLARY PROCEDURE (OUTPATIENT)
Dept: GENERAL RADIOLOGY | Facility: CLINIC | Age: 63
End: 2023-01-01
Attending: NURSE PRACTITIONER
Payer: COMMERCIAL

## 2023-01-01 VITALS
BODY MASS INDEX: 45.99 KG/M2 | HEART RATE: 91 BPM | SYSTOLIC BLOOD PRESSURE: 155 MMHG | DIASTOLIC BLOOD PRESSURE: 69 MMHG | TEMPERATURE: 98.4 F | RESPIRATION RATE: 18 BRPM | WEIGHT: 293 LBS | OXYGEN SATURATION: 92 % | HEIGHT: 67 IN

## 2023-01-01 VITALS
WEIGHT: 293 LBS | BODY MASS INDEX: 56.07 KG/M2 | TEMPERATURE: 99.6 F | OXYGEN SATURATION: 93 % | RESPIRATION RATE: 16 BRPM | DIASTOLIC BLOOD PRESSURE: 60 MMHG | SYSTOLIC BLOOD PRESSURE: 132 MMHG | HEART RATE: 96 BPM

## 2023-01-01 VITALS
SYSTOLIC BLOOD PRESSURE: 167 MMHG | RESPIRATION RATE: 18 BRPM | TEMPERATURE: 98 F | DIASTOLIC BLOOD PRESSURE: 73 MMHG | HEART RATE: 91 BPM | OXYGEN SATURATION: 94 %

## 2023-01-01 VITALS
HEART RATE: 96 BPM | WEIGHT: 293 LBS | RESPIRATION RATE: 16 BRPM | DIASTOLIC BLOOD PRESSURE: 70 MMHG | OXYGEN SATURATION: 95 % | TEMPERATURE: 98.2 F | HEIGHT: 67 IN | BODY MASS INDEX: 45.99 KG/M2 | SYSTOLIC BLOOD PRESSURE: 134 MMHG

## 2023-01-01 DIAGNOSIS — N20.1 URETERAL STONE: ICD-10-CM

## 2023-01-01 DIAGNOSIS — E66.9 LYMPHEDEMA ASSOCIATED WITH OBESITY: ICD-10-CM

## 2023-01-01 DIAGNOSIS — R06.2 WHEEZING: ICD-10-CM

## 2023-01-01 DIAGNOSIS — N20.1 URETERAL STONE: Primary | ICD-10-CM

## 2023-01-01 DIAGNOSIS — I89.0 LYMPHEDEMA: ICD-10-CM

## 2023-01-01 DIAGNOSIS — R73.03 PRE-DIABETES: ICD-10-CM

## 2023-01-01 DIAGNOSIS — M45.9 ANKYLOSING SPONDYLITIS, UNSPECIFIED SITE OF SPINE (H): ICD-10-CM

## 2023-01-01 DIAGNOSIS — I10 ELEVATED BLOOD PRESSURE READING WITH DIAGNOSIS OF HYPERTENSION: ICD-10-CM

## 2023-01-01 DIAGNOSIS — R11.2 NAUSEA VOMITING AND DIARRHEA: ICD-10-CM

## 2023-01-01 DIAGNOSIS — Z72.0 TOBACCO ABUSE: ICD-10-CM

## 2023-01-01 DIAGNOSIS — R19.7 NAUSEA VOMITING AND DIARRHEA: ICD-10-CM

## 2023-01-01 DIAGNOSIS — I10 ESSENTIAL HYPERTENSION: ICD-10-CM

## 2023-01-01 DIAGNOSIS — N17.9 AKI (ACUTE KIDNEY INJURY) (H): ICD-10-CM

## 2023-01-01 DIAGNOSIS — R30.0 DYSURIA: ICD-10-CM

## 2023-01-01 DIAGNOSIS — I89.0 LYMPHEDEMA ASSOCIATED WITH OBESITY: ICD-10-CM

## 2023-01-01 DIAGNOSIS — E66.01 CLASS 3 SEVERE OBESITY DUE TO EXCESS CALORIES WITH SERIOUS COMORBIDITY AND BODY MASS INDEX (BMI) OF 45.0 TO 49.9 IN ADULT (H): Primary | ICD-10-CM

## 2023-01-01 DIAGNOSIS — E66.813 CLASS 3 SEVERE OBESITY DUE TO EXCESS CALORIES WITH SERIOUS COMORBIDITY AND BODY MASS INDEX (BMI) OF 45.0 TO 49.9 IN ADULT (H): Primary | ICD-10-CM

## 2023-01-01 DIAGNOSIS — N23 RENAL COLIC: ICD-10-CM

## 2023-01-01 DIAGNOSIS — R68.2 DRY MOUTH: ICD-10-CM

## 2023-01-01 DIAGNOSIS — R82.90 ABNORMAL URINALYSIS: ICD-10-CM

## 2023-01-01 DIAGNOSIS — R11.2 NAUSEA AND VOMITING, UNSPECIFIED VOMITING TYPE: ICD-10-CM

## 2023-01-01 DIAGNOSIS — E66.01 CLASS 3 SEVERE OBESITY DUE TO EXCESS CALORIES WITH SERIOUS COMORBIDITY AND BODY MASS INDEX (BMI) OF 45.0 TO 49.9 IN ADULT (H): ICD-10-CM

## 2023-01-01 DIAGNOSIS — E66.813 CLASS 3 SEVERE OBESITY DUE TO EXCESS CALORIES WITH SERIOUS COMORBIDITY AND BODY MASS INDEX (BMI) OF 45.0 TO 49.9 IN ADULT (H): ICD-10-CM

## 2023-01-01 DIAGNOSIS — N20.0 BILATERAL KIDNEY STONES: ICD-10-CM

## 2023-01-01 DIAGNOSIS — Z87.898 HISTORY OF WHEEZING: ICD-10-CM

## 2023-01-01 DIAGNOSIS — Z96.0 URETERAL STENT PRESENT: ICD-10-CM

## 2023-01-01 LAB
ALBUMIN SERPL BCG-MCNC: 4.1 G/DL (ref 3.5–5.2)
ALBUMIN SERPL BCG-MCNC: 4.4 G/DL (ref 3.5–5.2)
ALBUMIN UR-MCNC: 10 MG/DL
ALBUMIN UR-MCNC: 200 MG/DL
ALBUMIN UR-MCNC: >=300 MG/DL
ALP SERPL-CCNC: 103 U/L (ref 35–104)
ALP SERPL-CCNC: 90 U/L (ref 35–104)
ALT SERPL W P-5'-P-CCNC: 24 U/L (ref 0–50)
ALT SERPL W P-5'-P-CCNC: 34 U/L (ref 0–50)
ANION GAP SERPL CALCULATED.3IONS-SCNC: 10 MMOL/L (ref 7–15)
ANION GAP SERPL CALCULATED.3IONS-SCNC: 11 MMOL/L (ref 7–15)
ANION GAP SERPL CALCULATED.3IONS-SCNC: 7 MMOL/L (ref 7–15)
ANION GAP SERPL CALCULATED.3IONS-SCNC: 8 MMOL/L (ref 7–15)
ANION GAP SERPL CALCULATED.3IONS-SCNC: 8 MMOL/L (ref 7–15)
ANION GAP SERPL CALCULATED.3IONS-SCNC: 9 MMOL/L (ref 7–15)
APPEARANCE UR: ABNORMAL
APPEARANCE UR: CLEAR
APPEARANCE UR: CLEAR
AST SERPL W P-5'-P-CCNC: 30 U/L (ref 0–45)
AST SERPL W P-5'-P-CCNC: 35 U/L (ref 0–45)
BACTERIA #/AREA URNS HPF: ABNORMAL /HPF
BACTERIA UR CULT: NORMAL
BASOPHILS # BLD AUTO: 0 10E3/UL (ref 0–0.2)
BASOPHILS # BLD AUTO: 0 10E3/UL (ref 0–0.2)
BASOPHILS # BLD AUTO: 0.1 10E3/UL (ref 0–0.2)
BASOPHILS NFR BLD AUTO: 0 %
BASOPHILS NFR BLD AUTO: 0 %
BASOPHILS NFR BLD AUTO: 1 %
BILIRUB SERPL-MCNC: 0.6 MG/DL
BILIRUB SERPL-MCNC: 0.7 MG/DL
BILIRUB UR QL STRIP: NEGATIVE
BUN SERPL-MCNC: 13.7 MG/DL (ref 8–23)
BUN SERPL-MCNC: 15.6 MG/DL (ref 8–23)
BUN SERPL-MCNC: 16 MG/DL (ref 8–23)
BUN SERPL-MCNC: 19.7 MG/DL (ref 8–23)
BUN SERPL-MCNC: 22.5 MG/DL (ref 8–23)
BUN SERPL-MCNC: 22.7 MG/DL (ref 8–23)
CALCIUM SERPL-MCNC: 10.3 MG/DL (ref 8.8–10.2)
CALCIUM SERPL-MCNC: 10.7 MG/DL (ref 8.8–10.2)
CALCIUM SERPL-MCNC: 10.8 MG/DL (ref 8.8–10.2)
CALCIUM SERPL-MCNC: 11 MG/DL (ref 8.8–10.2)
CALCIUM SERPL-MCNC: 9.4 MG/DL (ref 8.8–10.2)
CALCIUM SERPL-MCNC: 9.7 MG/DL (ref 8.8–10.2)
CHLORIDE SERPL-SCNC: 102 MMOL/L (ref 98–107)
CHLORIDE SERPL-SCNC: 103 MMOL/L (ref 98–107)
CHLORIDE SERPL-SCNC: 104 MMOL/L (ref 98–107)
CHLORIDE SERPL-SCNC: 104 MMOL/L (ref 98–107)
CHLORIDE SERPL-SCNC: 107 MMOL/L (ref 98–107)
CHLORIDE SERPL-SCNC: 107 MMOL/L (ref 98–107)
COLOR UR AUTO: ABNORMAL
COLOR UR AUTO: YELLOW
COLOR UR AUTO: YELLOW
CREAT SERPL-MCNC: 0.98 MG/DL (ref 0.51–0.95)
CREAT SERPL-MCNC: 1 MG/DL (ref 0.51–0.95)
CREAT SERPL-MCNC: 1.04 MG/DL (ref 0.51–0.95)
CREAT SERPL-MCNC: 1.12 MG/DL (ref 0.51–0.95)
CREAT SERPL-MCNC: 1.26 MG/DL (ref 0.51–0.95)
CREAT SERPL-MCNC: 1.27 MG/DL (ref 0.51–0.95)
DEPRECATED HCO3 PLAS-SCNC: 26 MMOL/L (ref 22–29)
DEPRECATED HCO3 PLAS-SCNC: 28 MMOL/L (ref 22–29)
EOSINOPHIL # BLD AUTO: 0 10E3/UL (ref 0–0.7)
EOSINOPHIL NFR BLD AUTO: 0 %
ERYTHROCYTE [DISTWIDTH] IN BLOOD BY AUTOMATED COUNT: 12.6 % (ref 10–15)
ERYTHROCYTE [DISTWIDTH] IN BLOOD BY AUTOMATED COUNT: 12.7 % (ref 10–15)
ERYTHROCYTE [DISTWIDTH] IN BLOOD BY AUTOMATED COUNT: 12.9 % (ref 10–15)
ERYTHROCYTE [DISTWIDTH] IN BLOOD BY AUTOMATED COUNT: 13.3 % (ref 10–15)
FLUAV RNA SPEC QL NAA+PROBE: NEGATIVE
FLUBV RNA RESP QL NAA+PROBE: NEGATIVE
GFR SERPL CREATININE-BSD FRML MDRD: 47 ML/MIN/1.73M2
GFR SERPL CREATININE-BSD FRML MDRD: 47 ML/MIN/1.73M2
GFR SERPL CREATININE-BSD FRML MDRD: 55 ML/MIN/1.73M2
GFR SERPL CREATININE-BSD FRML MDRD: 60 ML/MIN/1.73M2
GFR SERPL CREATININE-BSD FRML MDRD: 63 ML/MIN/1.73M2
GFR SERPL CREATININE-BSD FRML MDRD: 64 ML/MIN/1.73M2
GLUCOSE SERPL-MCNC: 104 MG/DL (ref 70–99)
GLUCOSE SERPL-MCNC: 114 MG/DL (ref 70–99)
GLUCOSE SERPL-MCNC: 116 MG/DL (ref 70–99)
GLUCOSE SERPL-MCNC: 120 MG/DL (ref 70–99)
GLUCOSE SERPL-MCNC: 126 MG/DL (ref 70–99)
GLUCOSE SERPL-MCNC: 134 MG/DL (ref 70–99)
GLUCOSE UR STRIP-MCNC: 30 MG/DL
GLUCOSE UR STRIP-MCNC: NEGATIVE MG/DL
GLUCOSE UR STRIP-MCNC: NEGATIVE MG/DL
HCT VFR BLD AUTO: 32.8 % (ref 35–47)
HCT VFR BLD AUTO: 37.8 % (ref 35–47)
HCT VFR BLD AUTO: 39.2 % (ref 35–47)
HCT VFR BLD AUTO: 41.5 % (ref 35–47)
HGB BLD-MCNC: 10.8 G/DL (ref 11.7–15.7)
HGB BLD-MCNC: 12.7 G/DL (ref 11.7–15.7)
HGB BLD-MCNC: 12.8 G/DL (ref 11.7–15.7)
HGB BLD-MCNC: 13.9 G/DL (ref 11.7–15.7)
HGB UR QL STRIP: ABNORMAL
HOLD SPECIMEN: NORMAL
IMM GRANULOCYTES # BLD: 0 10E3/UL
IMM GRANULOCYTES # BLD: 0 10E3/UL
IMM GRANULOCYTES # BLD: 0.1 10E3/UL
IMM GRANULOCYTES NFR BLD: 0 %
IMM GRANULOCYTES NFR BLD: 1 %
IMM GRANULOCYTES NFR BLD: 1 %
KETONES UR STRIP-MCNC: 20 MG/DL
KETONES UR STRIP-MCNC: NEGATIVE MG/DL
KETONES UR STRIP-MCNC: NEGATIVE MG/DL
LEUKOCYTE ESTERASE UR QL STRIP: ABNORMAL
LEUKOCYTE ESTERASE UR QL STRIP: ABNORMAL
LEUKOCYTE ESTERASE UR QL STRIP: NEGATIVE
LYMPHOCYTES # BLD AUTO: 1.2 10E3/UL (ref 0.8–5.3)
LYMPHOCYTES # BLD AUTO: 1.6 10E3/UL (ref 0.8–5.3)
LYMPHOCYTES # BLD AUTO: 1.7 10E3/UL (ref 0.8–5.3)
LYMPHOCYTES NFR BLD AUTO: 14 %
LYMPHOCYTES NFR BLD AUTO: 14 %
LYMPHOCYTES NFR BLD AUTO: 16 %
MCH RBC QN AUTO: 30.5 PG (ref 26.5–33)
MCH RBC QN AUTO: 30.5 PG (ref 26.5–33)
MCH RBC QN AUTO: 30.7 PG (ref 26.5–33)
MCH RBC QN AUTO: 30.8 PG (ref 26.5–33)
MCHC RBC AUTO-ENTMCNC: 32.4 G/DL (ref 31.5–36.5)
MCHC RBC AUTO-ENTMCNC: 32.9 G/DL (ref 31.5–36.5)
MCHC RBC AUTO-ENTMCNC: 33.5 G/DL (ref 31.5–36.5)
MCHC RBC AUTO-ENTMCNC: 33.9 G/DL (ref 31.5–36.5)
MCV RBC AUTO: 91 FL (ref 78–100)
MCV RBC AUTO: 91 FL (ref 78–100)
MCV RBC AUTO: 93 FL (ref 78–100)
MCV RBC AUTO: 94 FL (ref 78–100)
MONOCYTES # BLD AUTO: 0.5 10E3/UL (ref 0–1.3)
MONOCYTES # BLD AUTO: 0.7 10E3/UL (ref 0–1.3)
MONOCYTES # BLD AUTO: 0.8 10E3/UL (ref 0–1.3)
MONOCYTES NFR BLD AUTO: 6 %
MONOCYTES NFR BLD AUTO: 6 %
MONOCYTES NFR BLD AUTO: 7 %
MUCOUS THREADS #/AREA URNS LPF: PRESENT /LPF
MUCOUS THREADS #/AREA URNS LPF: PRESENT /LPF
NEUTROPHILS # BLD AUTO: 6.9 10E3/UL (ref 1.6–8.3)
NEUTROPHILS # BLD AUTO: 8 10E3/UL (ref 1.6–8.3)
NEUTROPHILS # BLD AUTO: 9.3 10E3/UL (ref 1.6–8.3)
NEUTROPHILS NFR BLD AUTO: 77 %
NEUTROPHILS NFR BLD AUTO: 78 %
NEUTROPHILS NFR BLD AUTO: 79 %
NITRATE UR QL: NEGATIVE
NRBC # BLD AUTO: 0 10E3/UL
NRBC BLD AUTO-RTO: 0 /100
PH UR STRIP: 6 [PH] (ref 5–7)
PH UR STRIP: 6.5 [PH] (ref 5–7)
PH UR STRIP: 6.5 [PH] (ref 5–7)
PLATELET # BLD AUTO: 223 10E3/UL (ref 150–450)
PLATELET # BLD AUTO: 268 10E3/UL (ref 150–450)
PLATELET # BLD AUTO: 283 10E3/UL (ref 150–450)
PLATELET # BLD AUTO: 333 10E3/UL (ref 150–450)
POTASSIUM SERPL-SCNC: 3.7 MMOL/L (ref 3.4–5.3)
POTASSIUM SERPL-SCNC: 3.8 MMOL/L (ref 3.4–5.3)
POTASSIUM SERPL-SCNC: 4.2 MMOL/L (ref 3.4–5.3)
POTASSIUM SERPL-SCNC: 4.5 MMOL/L (ref 3.4–5.3)
PROT SERPL-MCNC: 6.6 G/DL (ref 6.4–8.3)
PROT SERPL-MCNC: 7.3 G/DL (ref 6.4–8.3)
RBC # BLD AUTO: 3.52 10E6/UL (ref 3.8–5.2)
RBC # BLD AUTO: 4.16 10E6/UL (ref 3.8–5.2)
RBC # BLD AUTO: 4.17 10E6/UL (ref 3.8–5.2)
RBC # BLD AUTO: 4.55 10E6/UL (ref 3.8–5.2)
RBC #/AREA URNS AUTO: >100 /HPF
RBC URINE: 74 /HPF
RBC URINE: >182 /HPF
RSV RNA SPEC NAA+PROBE: NEGATIVE
SARS-COV-2 RNA RESP QL NAA+PROBE: NEGATIVE
SODIUM SERPL-SCNC: 138 MMOL/L (ref 136–145)
SODIUM SERPL-SCNC: 139 MMOL/L (ref 136–145)
SODIUM SERPL-SCNC: 139 MMOL/L (ref 136–145)
SODIUM SERPL-SCNC: 140 MMOL/L (ref 136–145)
SODIUM SERPL-SCNC: 141 MMOL/L (ref 136–145)
SODIUM SERPL-SCNC: 141 MMOL/L (ref 136–145)
SP GR UR STRIP: 1.02 (ref 1–1.03)
SQUAMOUS #/AREA URNS AUTO: ABNORMAL /LPF
SQUAMOUS EPITHELIAL: 2 /HPF
SQUAMOUS EPITHELIAL: 3 /HPF
UROBILINOGEN UR STRIP-ACNC: 1 E.U./DL
UROBILINOGEN UR STRIP-MCNC: NORMAL MG/DL
UROBILINOGEN UR STRIP-MCNC: NORMAL MG/DL
WBC # BLD AUTO: 10.6 10E3/UL (ref 4–11)
WBC # BLD AUTO: 11.8 10E3/UL (ref 4–11)
WBC # BLD AUTO: 11.8 10E3/UL (ref 4–11)
WBC # BLD AUTO: 8.7 10E3/UL (ref 4–11)
WBC #/AREA URNS AUTO: ABNORMAL /HPF
WBC URINE: 104 /HPF
WBC URINE: 8 /HPF

## 2023-01-01 PROCEDURE — 250N000013 HC RX MED GY IP 250 OP 250 PS 637: Performed by: UROLOGY

## 2023-01-01 PROCEDURE — C1758 CATHETER, URETERAL: HCPCS | Performed by: UROLOGY

## 2023-01-01 PROCEDURE — G0378 HOSPITAL OBSERVATION PER HR: HCPCS

## 2023-01-01 PROCEDURE — 250N000011 HC RX IP 250 OP 636

## 2023-01-01 PROCEDURE — 74420 UROGRAPHY RTRGR +-KUB: CPT | Mod: 26 | Performed by: UROLOGY

## 2023-01-01 PROCEDURE — 370N000017 HC ANESTHESIA TECHNICAL FEE, PER MIN: Performed by: UROLOGY

## 2023-01-01 PROCEDURE — 258N000003 HC RX IP 258 OP 636: Performed by: PHYSICIAN ASSISTANT

## 2023-01-01 PROCEDURE — 250N000011 HC RX IP 250 OP 636: Performed by: EMERGENCY MEDICINE

## 2023-01-01 PROCEDURE — 258N000003 HC RX IP 258 OP 636: Performed by: EMERGENCY MEDICINE

## 2023-01-01 PROCEDURE — 85025 COMPLETE CBC W/AUTO DIFF WBC: CPT | Performed by: EMERGENCY MEDICINE

## 2023-01-01 PROCEDURE — 250N000025 HC SEVOFLURANE, PER MIN: Performed by: UROLOGY

## 2023-01-01 PROCEDURE — 36415 COLL VENOUS BLD VENIPUNCTURE: CPT | Performed by: EMERGENCY MEDICINE

## 2023-01-01 PROCEDURE — 99207 PR NO CHARGE LOS: CPT | Performed by: PHARMACIST

## 2023-01-01 PROCEDURE — 99284 EMERGENCY DEPT VISIT MOD MDM: CPT | Mod: 25

## 2023-01-01 PROCEDURE — 255N000002 HC RX 255 OP 636: Mod: JZ | Performed by: UROLOGY

## 2023-01-01 PROCEDURE — 99222 1ST HOSP IP/OBS MODERATE 55: CPT | Performed by: PHYSICIAN ASSISTANT

## 2023-01-01 PROCEDURE — 710N000009 HC RECOVERY PHASE 1, LEVEL 1, PER MIN: Performed by: UROLOGY

## 2023-01-01 PROCEDURE — 81001 URINALYSIS AUTO W/SCOPE: CPT | Performed by: EMERGENCY MEDICINE

## 2023-01-01 PROCEDURE — 99495 TRANSJ CARE MGMT MOD F2F 14D: CPT | Performed by: NURSE PRACTITIONER

## 2023-01-01 PROCEDURE — 96375 TX/PRO/DX INJ NEW DRUG ADDON: CPT | Mod: XU

## 2023-01-01 PROCEDURE — C1769 GUIDE WIRE: HCPCS | Performed by: UROLOGY

## 2023-01-01 PROCEDURE — 258N000001 HC RX 258: Performed by: UROLOGY

## 2023-01-01 PROCEDURE — 87086 URINE CULTURE/COLONY COUNT: CPT | Performed by: EMERGENCY MEDICINE

## 2023-01-01 PROCEDURE — 87637 SARSCOV2&INF A&B&RSV AMP PRB: CPT | Performed by: EMERGENCY MEDICINE

## 2023-01-01 PROCEDURE — 250N000011 HC RX IP 250 OP 636: Performed by: UROLOGY

## 2023-01-01 PROCEDURE — 250N000009 HC RX 250: Performed by: UROLOGY

## 2023-01-01 PROCEDURE — 71046 X-RAY EXAM CHEST 2 VIEWS: CPT | Mod: TC | Performed by: RADIOLOGY

## 2023-01-01 PROCEDURE — 82310 ASSAY OF CALCIUM: CPT | Performed by: EMERGENCY MEDICINE

## 2023-01-01 PROCEDURE — 85004 AUTOMATED DIFF WBC COUNT: CPT | Performed by: EMERGENCY MEDICINE

## 2023-01-01 PROCEDURE — 52332 CYSTOSCOPY AND TREATMENT: CPT | Mod: 50 | Performed by: UROLOGY

## 2023-01-01 PROCEDURE — 85027 COMPLETE CBC AUTOMATED: CPT | Performed by: EMERGENCY MEDICINE

## 2023-01-01 PROCEDURE — 250N000009 HC RX 250

## 2023-01-01 PROCEDURE — 96361 HYDRATE IV INFUSION ADD-ON: CPT | Mod: XU

## 2023-01-01 PROCEDURE — 81001 URINALYSIS AUTO W/SCOPE: CPT | Performed by: NURSE PRACTITIONER

## 2023-01-01 PROCEDURE — 258N000003 HC RX IP 258 OP 636

## 2023-01-01 PROCEDURE — 250N000011 HC RX IP 250 OP 636: Mod: JZ | Performed by: EMERGENCY MEDICINE

## 2023-01-01 PROCEDURE — C2617 STENT, NON-COR, TEM W/O DEL: HCPCS | Performed by: UROLOGY

## 2023-01-01 PROCEDURE — 80053 COMPREHEN METABOLIC PANEL: CPT | Performed by: EMERGENCY MEDICINE

## 2023-01-01 PROCEDURE — 36415 COLL VENOUS BLD VENIPUNCTURE: CPT | Performed by: NURSE PRACTITIONER

## 2023-01-01 PROCEDURE — 99213 OFFICE O/P EST LOW 20 MIN: CPT | Performed by: PHYSICIAN ASSISTANT

## 2023-01-01 PROCEDURE — 82310 ASSAY OF CALCIUM: CPT | Performed by: UROLOGY

## 2023-01-01 PROCEDURE — 74177 CT ABD & PELVIS W/CONTRAST: CPT

## 2023-01-01 PROCEDURE — 74176 CT ABD & PELVIS W/O CONTRAST: CPT

## 2023-01-01 PROCEDURE — 80048 BASIC METABOLIC PNL TOTAL CA: CPT

## 2023-01-01 PROCEDURE — 80048 BASIC METABOLIC PNL TOTAL CA: CPT | Performed by: NURSE PRACTITIONER

## 2023-01-01 PROCEDURE — 272N000001 HC OR GENERAL SUPPLY STERILE: Performed by: UROLOGY

## 2023-01-01 PROCEDURE — 96361 HYDRATE IV INFUSION ADD-ON: CPT

## 2023-01-01 PROCEDURE — 258N000001 HC RX 258: Performed by: EMERGENCY MEDICINE

## 2023-01-01 PROCEDURE — 360N000083 HC SURGERY LEVEL 3 W/ FLUORO, PER MIN: Performed by: UROLOGY

## 2023-01-01 PROCEDURE — 96374 THER/PROPH/DIAG INJ IV PUSH: CPT | Mod: 59

## 2023-01-01 PROCEDURE — 85014 HEMATOCRIT: CPT | Performed by: UROLOGY

## 2023-01-01 PROCEDURE — 258N000003 HC RX IP 258 OP 636: Performed by: UROLOGY

## 2023-01-01 PROCEDURE — 99239 HOSP IP/OBS DSCHRG MGMT >30: CPT | Performed by: PHYSICIAN ASSISTANT

## 2023-01-01 PROCEDURE — 250N000013 HC RX MED GY IP 250 OP 250 PS 637: Performed by: PHYSICIAN ASSISTANT

## 2023-01-01 PROCEDURE — 99285 EMERGENCY DEPT VISIT HI MDM: CPT | Mod: 25

## 2023-01-01 PROCEDURE — 36415 COLL VENOUS BLD VENIPUNCTURE: CPT

## 2023-01-01 PROCEDURE — 999N000141 HC STATISTIC PRE-PROCEDURE NURSING ASSESSMENT: Performed by: UROLOGY

## 2023-01-01 PROCEDURE — 999N000179 XR SURGERY CARM FLUORO LESS THAN 5 MIN W STILLS: Mod: TC

## 2023-01-01 PROCEDURE — 96360 HYDRATION IV INFUSION INIT: CPT

## 2023-01-01 PROCEDURE — 36415 COLL VENOUS BLD VENIPUNCTURE: CPT | Performed by: UROLOGY

## 2023-01-01 PROCEDURE — 96374 THER/PROPH/DIAG INJ IV PUSH: CPT

## 2023-01-01 PROCEDURE — 99204 OFFICE O/P NEW MOD 45 MIN: CPT | Mod: 25 | Performed by: UROLOGY

## 2023-01-01 PROCEDURE — 258N000003 HC RX IP 258 OP 636: Performed by: ANESTHESIOLOGY

## 2023-01-01 DEVICE — STENT URETERAL POLARIS ULTRA 6FRX26CM M0061921330: Type: IMPLANTABLE DEVICE | Site: URETER | Status: FUNCTIONAL

## 2023-01-01 DEVICE — STENT URETERAL POLARIS ULTRA 6FRX24CM M0061921320: Type: IMPLANTABLE DEVICE | Site: URETER | Status: FUNCTIONAL

## 2023-01-01 RX ORDER — ONDANSETRON 4 MG/1
4 TABLET, ORALLY DISINTEGRATING ORAL EVERY 30 MIN PRN
Status: DISCONTINUED | OUTPATIENT
Start: 2023-01-01 | End: 2023-01-01 | Stop reason: HOSPADM

## 2023-01-01 RX ORDER — HYDRALAZINE HYDROCHLORIDE 20 MG/ML
5-10 INJECTION INTRAMUSCULAR; INTRAVENOUS EVERY 10 MIN PRN
Status: DISCONTINUED | OUTPATIENT
Start: 2023-01-01 | End: 2023-01-01 | Stop reason: HOSPADM

## 2023-01-01 RX ORDER — CEFAZOLIN SODIUM/WATER 3 G/30 ML
3 SYRINGE (ML) INTRAVENOUS SEE ADMIN INSTRUCTIONS
Status: DISCONTINUED | OUTPATIENT
Start: 2023-01-01 | End: 2023-01-01

## 2023-01-01 RX ORDER — ONDANSETRON 2 MG/ML
4 INJECTION INTRAMUSCULAR; INTRAVENOUS
Status: DISCONTINUED | OUTPATIENT
Start: 2023-01-01 | End: 2023-01-01 | Stop reason: HOSPADM

## 2023-01-01 RX ORDER — OXYCODONE HYDROCHLORIDE 5 MG/1
10 TABLET ORAL
Status: CANCELLED | OUTPATIENT
Start: 2023-01-01

## 2023-01-01 RX ORDER — NALOXONE HYDROCHLORIDE 0.4 MG/ML
0.4 INJECTION, SOLUTION INTRAMUSCULAR; INTRAVENOUS; SUBCUTANEOUS
Status: DISCONTINUED | OUTPATIENT
Start: 2023-01-01 | End: 2023-01-01 | Stop reason: HOSPADM

## 2023-01-01 RX ORDER — OXYBUTYNIN CHLORIDE 5 MG/1
5 TABLET ORAL 3 TIMES DAILY PRN
Qty: 21 TABLET | Refills: 0 | Status: SHIPPED | OUTPATIENT
Start: 2023-01-01 | End: 2023-01-01

## 2023-01-01 RX ORDER — TAMSULOSIN HYDROCHLORIDE 0.4 MG/1
0.4 CAPSULE ORAL DAILY
Status: DISCONTINUED | OUTPATIENT
Start: 2023-01-01 | End: 2023-01-01 | Stop reason: HOSPADM

## 2023-01-01 RX ORDER — FAMOTIDINE 20 MG/1
20 TABLET, FILM COATED ORAL 2 TIMES DAILY
Status: DISCONTINUED | OUTPATIENT
Start: 2023-01-01 | End: 2023-01-01 | Stop reason: HOSPADM

## 2023-01-01 RX ORDER — SODIUM CHLORIDE, SODIUM LACTATE, POTASSIUM CHLORIDE, CALCIUM CHLORIDE 600; 310; 30; 20 MG/100ML; MG/100ML; MG/100ML; MG/100ML
INJECTION, SOLUTION INTRAVENOUS CONTINUOUS PRN
Status: DISCONTINUED | OUTPATIENT
Start: 2023-01-01 | End: 2023-01-01

## 2023-01-01 RX ORDER — OXYBUTYNIN CHLORIDE 5 MG/1
5 TABLET ORAL 3 TIMES DAILY PRN
Qty: 20 TABLET | Refills: 0 | Status: SHIPPED | OUTPATIENT
Start: 2023-01-01 | End: 2023-01-01

## 2023-01-01 RX ORDER — ACETAMINOPHEN 325 MG/1
650 TABLET ORAL EVERY 6 HOURS PRN
Status: DISCONTINUED | OUTPATIENT
Start: 2023-01-01 | End: 2023-01-01 | Stop reason: HOSPADM

## 2023-01-01 RX ORDER — CIPROFLOXACIN 2 MG/ML
400 INJECTION, SOLUTION INTRAVENOUS ONCE
Status: COMPLETED | OUTPATIENT
Start: 2023-01-01 | End: 2023-01-01

## 2023-01-01 RX ORDER — NALTREXONE HYDROCHLORIDE 50 MG/1
50 TABLET, FILM COATED ORAL DAILY
Qty: 30 TABLET | Refills: 2 | Status: SHIPPED | OUTPATIENT
Start: 2023-01-01 | End: 2023-01-01

## 2023-01-01 RX ORDER — NALOXONE HYDROCHLORIDE 0.4 MG/ML
0.2 INJECTION, SOLUTION INTRAMUSCULAR; INTRAVENOUS; SUBCUTANEOUS
Status: DISCONTINUED | OUTPATIENT
Start: 2023-01-01 | End: 2023-01-01 | Stop reason: HOSPADM

## 2023-01-01 RX ORDER — NALTREXONE HYDROCHLORIDE 50 MG/1
TABLET, FILM COATED ORAL
Qty: 30 TABLET | Refills: 2 | Status: SHIPPED | OUTPATIENT
Start: 2023-01-01 | End: 2023-01-01

## 2023-01-01 RX ORDER — TAMSULOSIN HYDROCHLORIDE 0.4 MG/1
0.4 CAPSULE ORAL DAILY
Qty: 30 CAPSULE | Refills: 0 | Status: SHIPPED | OUTPATIENT
Start: 2023-01-01

## 2023-01-01 RX ORDER — ONDANSETRON 2 MG/ML
4 INJECTION INTRAMUSCULAR; INTRAVENOUS EVERY 30 MIN PRN
Status: CANCELLED | OUTPATIENT
Start: 2023-01-01

## 2023-01-01 RX ORDER — FENTANYL CITRATE 50 UG/ML
50 INJECTION, SOLUTION INTRAMUSCULAR; INTRAVENOUS EVERY 5 MIN PRN
Status: DISCONTINUED | OUTPATIENT
Start: 2023-01-01 | End: 2023-01-01 | Stop reason: HOSPADM

## 2023-01-01 RX ORDER — HYDROMORPHONE HYDROCHLORIDE 1 MG/ML
0.5 INJECTION, SOLUTION INTRAMUSCULAR; INTRAVENOUS; SUBCUTANEOUS EVERY 5 MIN PRN
Status: DISCONTINUED | OUTPATIENT
Start: 2023-01-01 | End: 2023-01-01 | Stop reason: HOSPADM

## 2023-01-01 RX ORDER — ACETAMINOPHEN 650 MG/1
650 SUPPOSITORY RECTAL EVERY 6 HOURS PRN
Status: DISCONTINUED | OUTPATIENT
Start: 2023-01-01 | End: 2023-01-01 | Stop reason: HOSPADM

## 2023-01-01 RX ORDER — DIPHENHYDRAMINE HCL 25 MG
25 CAPSULE ORAL EVERY 6 HOURS PRN
Status: DISCONTINUED | OUTPATIENT
Start: 2023-01-01 | End: 2023-01-01 | Stop reason: HOSPADM

## 2023-01-01 RX ORDER — SODIUM CHLORIDE, SODIUM LACTATE, POTASSIUM CHLORIDE, CALCIUM CHLORIDE 600; 310; 30; 20 MG/100ML; MG/100ML; MG/100ML; MG/100ML
INJECTION, SOLUTION INTRAVENOUS CONTINUOUS
Status: DISCONTINUED | OUTPATIENT
Start: 2023-01-01 | End: 2023-01-01

## 2023-01-01 RX ORDER — BUPROPION HYDROCHLORIDE 150 MG/1
150 TABLET ORAL EVERY MORNING
Qty: 30 TABLET | Refills: 2 | Status: SHIPPED | OUTPATIENT
Start: 2023-01-01 | End: 2023-01-01

## 2023-01-01 RX ORDER — ONDANSETRON 2 MG/ML
4 INJECTION INTRAMUSCULAR; INTRAVENOUS EVERY 30 MIN PRN
Status: DISCONTINUED | OUTPATIENT
Start: 2023-01-01 | End: 2023-01-01 | Stop reason: HOSPADM

## 2023-01-01 RX ORDER — OXYCODONE HYDROCHLORIDE 5 MG/1
5 TABLET ORAL
Status: CANCELLED | OUTPATIENT
Start: 2023-01-01

## 2023-01-01 RX ORDER — DIPHENHYDRAMINE HYDROCHLORIDE 50 MG/ML
25 INJECTION INTRAMUSCULAR; INTRAVENOUS EVERY 6 HOURS PRN
Status: DISCONTINUED | OUTPATIENT
Start: 2023-01-01 | End: 2023-01-01 | Stop reason: HOSPADM

## 2023-01-01 RX ORDER — OXYCODONE HYDROCHLORIDE 5 MG/1
5 TABLET ORAL EVERY 6 HOURS PRN
Qty: 8 TABLET | Refills: 0 | Status: SHIPPED | OUTPATIENT
Start: 2023-01-01 | End: 2023-01-01

## 2023-01-01 RX ORDER — MAGNESIUM SULFATE HEPTAHYDRATE 40 MG/ML
2 INJECTION, SOLUTION INTRAVENOUS
Status: DISCONTINUED | OUTPATIENT
Start: 2023-01-01 | End: 2023-01-01 | Stop reason: HOSPADM

## 2023-01-01 RX ORDER — ALBUTEROL SULFATE 0.83 MG/ML
2.5 SOLUTION RESPIRATORY (INHALATION) EVERY 4 HOURS PRN
Status: DISCONTINUED | OUTPATIENT
Start: 2023-01-01 | End: 2023-01-01 | Stop reason: HOSPADM

## 2023-01-01 RX ORDER — OXYCODONE HYDROCHLORIDE 5 MG/1
5 TABLET ORAL EVERY 4 HOURS PRN
Status: DISCONTINUED | OUTPATIENT
Start: 2023-01-01 | End: 2023-01-01 | Stop reason: HOSPADM

## 2023-01-01 RX ORDER — CIPROFLOXACIN 2 MG/ML
INJECTION, SOLUTION INTRAVENOUS PRN
Status: DISCONTINUED | OUTPATIENT
Start: 2023-01-01 | End: 2023-01-01

## 2023-01-01 RX ORDER — HYDRALAZINE HYDROCHLORIDE 20 MG/ML
10 INJECTION INTRAMUSCULAR; INTRAVENOUS EVERY 4 HOURS PRN
Status: DISCONTINUED | OUTPATIENT
Start: 2023-01-01 | End: 2023-01-01 | Stop reason: HOSPADM

## 2023-01-01 RX ORDER — DEXAMETHASONE SODIUM PHOSPHATE 4 MG/ML
INJECTION, SOLUTION INTRA-ARTICULAR; INTRALESIONAL; INTRAMUSCULAR; INTRAVENOUS; SOFT TISSUE PRN
Status: DISCONTINUED | OUTPATIENT
Start: 2023-01-01 | End: 2023-01-01

## 2023-01-01 RX ORDER — ALBUTEROL SULFATE 90 UG/1
2 AEROSOL, METERED RESPIRATORY (INHALATION) EVERY 4 HOURS PRN
Qty: 18 G | Refills: 1 | Status: SHIPPED | OUTPATIENT
Start: 2023-01-01

## 2023-01-01 RX ORDER — ONDANSETRON 2 MG/ML
INJECTION INTRAMUSCULAR; INTRAVENOUS PRN
Status: DISCONTINUED | OUTPATIENT
Start: 2023-01-01 | End: 2023-01-01

## 2023-01-01 RX ORDER — DEXTROSE MONOHYDRATE 25 G/50ML
25-50 INJECTION, SOLUTION INTRAVENOUS
Status: DISCONTINUED | OUTPATIENT
Start: 2023-01-01 | End: 2023-01-01

## 2023-01-01 RX ORDER — ONDANSETRON 2 MG/ML
4 INJECTION INTRAMUSCULAR; INTRAVENOUS ONCE
Status: COMPLETED | OUTPATIENT
Start: 2023-01-01 | End: 2023-01-01

## 2023-01-01 RX ORDER — NICOTINE POLACRILEX 4 MG
15-30 LOZENGE BUCCAL
Status: DISCONTINUED | OUTPATIENT
Start: 2023-01-01 | End: 2023-01-01

## 2023-01-01 RX ORDER — DIPHENHYDRAMINE HYDROCHLORIDE 50 MG/ML
12.5 INJECTION INTRAMUSCULAR; INTRAVENOUS ONCE
Status: COMPLETED | OUTPATIENT
Start: 2023-01-01 | End: 2023-01-01

## 2023-01-01 RX ORDER — OXYBUTYNIN CHLORIDE 5 MG/1
5 TABLET ORAL 3 TIMES DAILY PRN
Qty: 30 TABLET | Refills: 0 | Status: SHIPPED | OUTPATIENT
Start: 2023-01-01 | End: 2023-01-01

## 2023-01-01 RX ORDER — HYDROMORPHONE HCL IN WATER/PF 6 MG/30 ML
0.2 PATIENT CONTROLLED ANALGESIA SYRINGE INTRAVENOUS
Status: DISCONTINUED | OUTPATIENT
Start: 2023-01-01 | End: 2023-01-01 | Stop reason: HOSPADM

## 2023-01-01 RX ORDER — SODIUM CHLORIDE, SODIUM LACTATE, POTASSIUM CHLORIDE, CALCIUM CHLORIDE 600; 310; 30; 20 MG/100ML; MG/100ML; MG/100ML; MG/100ML
INJECTION, SOLUTION INTRAVENOUS CONTINUOUS
Status: DISCONTINUED | OUTPATIENT
Start: 2023-01-01 | End: 2023-01-01 | Stop reason: HOSPADM

## 2023-01-01 RX ORDER — HYDROMORPHONE HCL IN WATER/PF 6 MG/30 ML
0.4 PATIENT CONTROLLED ANALGESIA SYRINGE INTRAVENOUS EVERY 5 MIN PRN
Status: DISCONTINUED | OUTPATIENT
Start: 2023-01-01 | End: 2023-01-01 | Stop reason: HOSPADM

## 2023-01-01 RX ORDER — OXYBUTYNIN CHLORIDE 5 MG/1
5 TABLET ORAL 3 TIMES DAILY PRN
Qty: 20 TABLET | Refills: 0 | OUTPATIENT
Start: 2023-01-01

## 2023-01-01 RX ORDER — CEFAZOLIN SODIUM/WATER 3 G/30 ML
3 SYRINGE (ML) INTRAVENOUS
Status: DISCONTINUED | OUTPATIENT
Start: 2023-01-01 | End: 2023-01-01

## 2023-01-01 RX ORDER — PROCHLORPERAZINE 25 MG
25 SUPPOSITORY, RECTAL RECTAL EVERY 12 HOURS PRN
Status: DISCONTINUED | OUTPATIENT
Start: 2023-01-01 | End: 2023-01-01 | Stop reason: HOSPADM

## 2023-01-01 RX ORDER — ONDANSETRON 4 MG/1
4 TABLET, ORALLY DISINTEGRATING ORAL EVERY 30 MIN PRN
Status: CANCELLED | OUTPATIENT
Start: 2023-01-01

## 2023-01-01 RX ORDER — KETOROLAC TROMETHAMINE 15 MG/ML
15 INJECTION, SOLUTION INTRAMUSCULAR; INTRAVENOUS
Status: DISCONTINUED | OUTPATIENT
Start: 2023-01-01 | End: 2023-01-01 | Stop reason: HOSPADM

## 2023-01-01 RX ORDER — HYDROXYZINE HYDROCHLORIDE 25 MG/1
25 TABLET, FILM COATED ORAL EVERY 6 HOURS PRN
Status: DISCONTINUED | OUTPATIENT
Start: 2023-01-01 | End: 2023-01-01 | Stop reason: HOSPADM

## 2023-01-01 RX ORDER — PROCHLORPERAZINE MALEATE 5 MG
10 TABLET ORAL EVERY 6 HOURS PRN
Status: DISCONTINUED | OUTPATIENT
Start: 2023-01-01 | End: 2023-01-01 | Stop reason: HOSPADM

## 2023-01-01 RX ORDER — DEXTROSE MONOHYDRATE 25 G/50ML
50 INJECTION, SOLUTION INTRAVENOUS ONCE
Status: COMPLETED | OUTPATIENT
Start: 2023-01-01 | End: 2023-01-01

## 2023-01-01 RX ORDER — NICOTINE 21 MG/24HR
1 PATCH, TRANSDERMAL 24 HOURS TRANSDERMAL DAILY PRN
Status: DISCONTINUED | OUTPATIENT
Start: 2023-01-01 | End: 2023-01-01 | Stop reason: HOSPADM

## 2023-01-01 RX ORDER — PROPOFOL 10 MG/ML
INJECTION, EMULSION INTRAVENOUS PRN
Status: DISCONTINUED | OUTPATIENT
Start: 2023-01-01 | End: 2023-01-01

## 2023-01-01 RX ORDER — ALBUTEROL SULFATE 0.83 MG/ML
2.5 SOLUTION RESPIRATORY (INHALATION)
Status: DISCONTINUED | OUTPATIENT
Start: 2023-01-01 | End: 2023-01-01 | Stop reason: HOSPADM

## 2023-01-01 RX ORDER — HYDROXYZINE HYDROCHLORIDE 50 MG/1
50 TABLET, FILM COATED ORAL EVERY 6 HOURS PRN
Status: DISCONTINUED | OUTPATIENT
Start: 2023-01-01 | End: 2023-01-01 | Stop reason: HOSPADM

## 2023-01-01 RX ORDER — ONDANSETRON 4 MG/1
4 TABLET, ORALLY DISINTEGRATING ORAL EVERY 6 HOURS PRN
Status: DISCONTINUED | OUTPATIENT
Start: 2023-01-01 | End: 2023-01-01 | Stop reason: HOSPADM

## 2023-01-01 RX ORDER — IOPAMIDOL 755 MG/ML
100 INJECTION, SOLUTION INTRAVASCULAR ONCE
Status: COMPLETED | OUTPATIENT
Start: 2023-01-01 | End: 2023-01-01

## 2023-01-01 RX ORDER — DIPHENHYDRAMINE HCL 25 MG
50 TABLET ORAL EVERY 6 HOURS PRN
COMMUNITY

## 2023-01-01 RX ORDER — ONDANSETRON 2 MG/ML
4 INJECTION INTRAMUSCULAR; INTRAVENOUS EVERY 6 HOURS PRN
Status: DISCONTINUED | OUTPATIENT
Start: 2023-01-01 | End: 2023-01-01 | Stop reason: HOSPADM

## 2023-01-01 RX ORDER — LABETALOL HYDROCHLORIDE 5 MG/ML
10 INJECTION, SOLUTION INTRAVENOUS
Status: DISCONTINUED | OUTPATIENT
Start: 2023-01-01 | End: 2023-01-01 | Stop reason: HOSPADM

## 2023-01-01 RX ORDER — FENTANYL CITRATE 50 UG/ML
INJECTION, SOLUTION INTRAMUSCULAR; INTRAVENOUS PRN
Status: DISCONTINUED | OUTPATIENT
Start: 2023-01-01 | End: 2023-01-01

## 2023-01-01 RX ORDER — LIDOCAINE HYDROCHLORIDE 20 MG/ML
INJECTION, SOLUTION INFILTRATION; PERINEURAL PRN
Status: DISCONTINUED | OUTPATIENT
Start: 2023-01-01 | End: 2023-01-01

## 2023-01-01 RX ORDER — ONDANSETRON 4 MG/1
4 TABLET, ORALLY DISINTEGRATING ORAL EVERY 8 HOURS PRN
Qty: 10 TABLET | Refills: 0 | Status: SHIPPED | OUTPATIENT
Start: 2023-01-01 | End: 2023-01-01

## 2023-01-01 RX ADMIN — ONDANSETRON 4 MG: 2 INJECTION INTRAMUSCULAR; INTRAVENOUS at 08:08

## 2023-01-01 RX ADMIN — MIDAZOLAM 1 MG: 1 INJECTION INTRAMUSCULAR; INTRAVENOUS at 16:54

## 2023-01-01 RX ADMIN — CIPROFLOXACIN 400 MG: 2 INJECTION, SOLUTION INTRAVENOUS at 17:15

## 2023-01-01 RX ADMIN — SODIUM CHLORIDE, POTASSIUM CHLORIDE, SODIUM LACTATE AND CALCIUM CHLORIDE: 600; 310; 30; 20 INJECTION, SOLUTION INTRAVENOUS at 17:38

## 2023-01-01 RX ADMIN — SODIUM CHLORIDE, POTASSIUM CHLORIDE, SODIUM LACTATE AND CALCIUM CHLORIDE: 600; 310; 30; 20 INJECTION, SOLUTION INTRAVENOUS at 20:00

## 2023-01-01 RX ADMIN — ONDANSETRON 4 MG: 2 INJECTION INTRAMUSCULAR; INTRAVENOUS at 17:00

## 2023-01-01 RX ADMIN — DEXTROSE MONOHYDRATE 50 ML: 25 INJECTION, SOLUTION INTRAVENOUS at 12:20

## 2023-01-01 RX ADMIN — HYDROXYZINE HYDROCHLORIDE 25 MG: 25 TABLET, FILM COATED ORAL at 22:37

## 2023-01-01 RX ADMIN — TAMSULOSIN HYDROCHLORIDE 0.4 MG: 0.4 CAPSULE ORAL at 08:21

## 2023-01-01 RX ADMIN — SODIUM CHLORIDE, POTASSIUM CHLORIDE, SODIUM LACTATE AND CALCIUM CHLORIDE: 600; 310; 30; 20 INJECTION, SOLUTION INTRAVENOUS at 02:04

## 2023-01-01 RX ADMIN — CIPROFLOXACIN 400 MG: 2 INJECTION INTRAVENOUS at 17:00

## 2023-01-01 RX ADMIN — Medication 100 MG: at 17:00

## 2023-01-01 RX ADMIN — SODIUM CHLORIDE 1000 ML: 9 INJECTION, SOLUTION INTRAVENOUS at 08:39

## 2023-01-01 RX ADMIN — SODIUM CHLORIDE 1000 ML: 9 INJECTION, SOLUTION INTRAVENOUS at 08:04

## 2023-01-01 RX ADMIN — SODIUM CHLORIDE, POTASSIUM CHLORIDE, SODIUM LACTATE AND CALCIUM CHLORIDE: 600; 310; 30; 20 INJECTION, SOLUTION INTRAVENOUS at 13:38

## 2023-01-01 RX ADMIN — HYDROXYZINE HYDROCHLORIDE 25 MG: 25 TABLET, FILM COATED ORAL at 04:44

## 2023-01-01 RX ADMIN — OXYCODONE HYDROCHLORIDE 5 MG: 5 TABLET ORAL at 14:10

## 2023-01-01 RX ADMIN — OXYCODONE HYDROCHLORIDE 5 MG: 5 TABLET ORAL at 04:44

## 2023-01-01 RX ADMIN — SODIUM CHLORIDE 1000 ML: 9 INJECTION, SOLUTION INTRAVENOUS at 08:06

## 2023-01-01 RX ADMIN — FAMOTIDINE 20 MG: 20 TABLET ORAL at 19:59

## 2023-01-01 RX ADMIN — ACETAMINOPHEN 650 MG: 325 TABLET, FILM COATED ORAL at 19:59

## 2023-01-01 RX ADMIN — SODIUM CHLORIDE, POTASSIUM CHLORIDE, SODIUM LACTATE AND CALCIUM CHLORIDE: 600; 310; 30; 20 INJECTION, SOLUTION INTRAVENOUS at 17:52

## 2023-01-01 RX ADMIN — FENTANYL CITRATE 100 MCG: 50 INJECTION, SOLUTION INTRAMUSCULAR; INTRAVENOUS at 17:00

## 2023-01-01 RX ADMIN — PROPOFOL 200 MG: 10 INJECTION, EMULSION INTRAVENOUS at 17:00

## 2023-01-01 RX ADMIN — DIPHENHYDRAMINE HYDROCHLORIDE 12.5 MG: 50 INJECTION INTRAMUSCULAR; INTRAVENOUS at 08:08

## 2023-01-01 RX ADMIN — FAMOTIDINE 20 MG: 20 TABLET ORAL at 08:21

## 2023-01-01 RX ADMIN — DEXAMETHASONE SODIUM PHOSPHATE 4 MG: 4 INJECTION, SOLUTION INTRA-ARTICULAR; INTRALESIONAL; INTRAMUSCULAR; INTRAVENOUS; SOFT TISSUE at 17:00

## 2023-01-01 RX ADMIN — SODIUM CHLORIDE, POTASSIUM CHLORIDE, SODIUM LACTATE AND CALCIUM CHLORIDE: 600; 310; 30; 20 INJECTION, SOLUTION INTRAVENOUS at 16:27

## 2023-01-01 RX ADMIN — LIDOCAINE HYDROCHLORIDE 50 MG: 20 INJECTION, SOLUTION INFILTRATION; PERINEURAL at 17:00

## 2023-01-01 RX ADMIN — TAMSULOSIN HYDROCHLORIDE 0.4 MG: 0.4 CAPSULE ORAL at 12:20

## 2023-01-01 RX ADMIN — IOPAMIDOL 100 ML: 755 INJECTION, SOLUTION INTRAVENOUS at 08:33

## 2023-01-01 RX ADMIN — ONDANSETRON 4 MG: 2 INJECTION INTRAMUSCULAR; INTRAVENOUS at 08:39

## 2023-01-01 RX ADMIN — PROCHLORPERAZINE EDISYLATE 5 MG: 5 INJECTION INTRAMUSCULAR; INTRAVENOUS at 08:06

## 2023-01-01 RX ADMIN — MIDAZOLAM 1 MG: 1 INJECTION INTRAMUSCULAR; INTRAVENOUS at 17:02

## 2023-01-01 RX ADMIN — OXYCODONE HYDROCHLORIDE 5 MG: 5 TABLET ORAL at 22:38

## 2023-01-01 ASSESSMENT — ACTIVITIES OF DAILY LIVING (ADL)
ADLS_ACUITY_SCORE: 35
ADLS_ACUITY_SCORE: 31
ADLS_ACUITY_SCORE: 31
ADLS_ACUITY_SCORE: 35
ADLS_ACUITY_SCORE: 31
ADLS_ACUITY_SCORE: 35
ADLS_ACUITY_SCORE: 31
ADLS_ACUITY_SCORE: 35
ADLS_ACUITY_SCORE: 31
ADLS_ACUITY_SCORE: 31
ADLS_ACUITY_SCORE: 35
ADLS_ACUITY_SCORE: 31

## 2023-01-01 ASSESSMENT — PATIENT HEALTH QUESTIONNAIRE - PHQ9: SUM OF ALL RESPONSES TO PHQ QUESTIONS 1-9: 5

## 2023-01-01 ASSESSMENT — LIFESTYLE VARIABLES: TOBACCO_USE: 1

## 2023-01-01 ASSESSMENT — PAIN SCALES - GENERAL: PAINLEVEL: MILD PAIN (2)

## 2023-01-08 NOTE — PROGRESS NOTES
Medication Therapy Management (MTM) Encounter    ASSESSMENT:                            Medication Adherence/Access: Cost is a concern and not able to afford the GLP-1 agonist    Smoking cessation: Patient continues to use tobacco. Patient is not ready to quit using tobacco. Addition of bupropion and naltrexone may help with smoking cessation    Class III Obesity (BMI 40 or more)/prediabetes: unable to afford GLP-1 agonist, would benefit from starting naltrexone and bupropion which may be more affordable as individual agents of Contrave which is approved for obesity.  Also discussed diet modifications and increasing activity which she is not able to start this month but consider in the future.    Hypertension/Edema: Patient is meeting blood pressure goal of < 130/80mmHg. Patient would benefit from the following changes - limiting ibuprofen use, see below.    Ankylosing spondylitis: discussed ibuprofen and risk of increasing leg swelling, she can not afford MRI which rheumatologist recommended at this time at next steps    PLAN:                            Weight Management:    1.  Start naltrexone start 1/2 of the 50mg tablets for 3-7 days and increase to 1/2 tablet twice daily   2.  Start Bupropion 150mg ER every day     Smoking cessation: great job on reducing your number of cigarettes from this summer!    Follow-up: Return in 5 weeks (on 2/14/2023) for MTM Pharmacist Visit, video visit.    Future consideration: next face to face visit, last one October 2021, updated blood pressure and labs, smoking cessation quit date    SUBJECTIVE/OBJECTIVE:                          Katharina Mejias is a 62 year old female coming in for a follow-up visit.  Today's visit is a follow-up MTM visit from 11/22/2022     Reason for visit: weight management.    Allergies/ADRs: Reviewed in chart  Past Medical History: Reviewed in chart  Tobacco: She reports that she has been smoking cigarettes. She started smoking about 52 years ago. She  "has a 20.00 pack-year smoking history. She has never used smokeless tobacco.Nicotine/Tobacco Cessation Plan:   She is at 6-7 per day, at 1 pack per day in summer  Alcohol: 1-3 beverages / month    Medication Adherence/Access:   Medication barriers: Ozempic cost her $800 per month, she could not afford this, Saxenda was also expensive.  She has a deductible, $3500 out of pocket deductible which has started again for this year.  The patient fills medications at [facility]: No [facility] or [facility]    Class III Obesity (BMI 40 or more)/prediabetes: Current medication(s) include: none, could not afford Saxenda and Ozempic not covered.  She reports no issues with mood, January is difficulty month generally. Having difficulty shutting mind off and sleeping at night with her busy schedule this month, she is working 6 days for 10 hours.  Nutrition/Eating Habits: Patient reports she is not well with this, hard cooking at home, she had tried tracking what she is eating, and will watch that, she feels she knows what to do.  Eating more fruits and veggies.   Exercise/Activity: Patient reports not going well, this is a very busy month   Failed medications include: Phentermine: doctors does not want her on this anymore since she was on this for year and Topiramate: has history of kidney stones  Naltrexone she has not tried  Weight:  346 lb four days ago    Wt Readings from Last 4 Encounters:   06/16/21 318 lb (144.2 kg)   03/23/21 314 lb (142.4 kg)   12/23/19 326 lb 6.4 oz (148.1 kg)   10/07/19 (!) 341 lb 12.8 oz (155 kg)     Estimated body mass index is 49.81 kg/m  as calculated from the following:    Height as of 6/16/21: 5' 7\" (1.702 m).    Weight as of 6/16/21: 318 lb (144.2 kg).    Lab Results   Component Value Date    A1C 5.7 12/09/2022    A1C 5.7 09/27/2021    A1C 6.0 12/14/2020    A1C 5.8 10/07/2019    A1C 5.6 06/26/2017     Lab Results   Component Value Date    AST 23 12/09/2022    AST 17 08/05/2020 "     Lab Results   Component Value Date    ALT 20 12/09/2022    ALT 30 08/05/2020       Hypertension/Edema: Current medications include losartan 100mg every day, spironolactone 50mg every day.  She continues to have leg swelling and worse when she works, better when she raises or elevates her legs. She has a prescription for compression stockings which has not picked up.  Patient does self-monitor blood pressure.  Patient reports no current medication side effects.  BP Readings from Last 3 Encounters:   10/04/21 130/70   03/23/21 116/64   03/16/20 124/64     Creatinine   Date Value Ref Range Status   11/22/2021 0.88 0.52 - 1.04 mg/dL Final   04/28/2021 0.99 0.52 - 1.04 mg/dL Final     Potassium   Date Value Ref Range Status   11/22/2021 4.2 3.4 - 5.3 mmol/L Final   04/28/2021 3.7 3.4 - 5.3 mmol/L Final     Ankylosing spondylitis: Current medication is ibuprofen 400mg three times daily--6-12 per day.  Acetaminophen does not work. She has been taking for years. Side effects: edema She reports the rheumatologist said to stay with ibuprofen or try Enbrel which she does not want to take.  She got a second opinion from rheumatologist but wanted MRI which was too expensive.    Creatinine   Date Value Ref Range Status   12/09/2022 0.88 0.51 - 0.95 mg/dL Final   04/28/2021 0.99 0.52 - 1.04 mg/dL Final     Hemoglobin   Date Value Ref Range Status   12/09/2022 14.1 11.7 - 15.7 g/dL Final   03/23/2021 13.8 11.7 - 15.7 g/dL Final   ]  Creatinine   Date Value Ref Range Status   12/09/2022 0.88 0.51 - 0.95 mg/dL Final   04/28/2021 0.99 0.52 - 1.04 mg/dL Final     Lab Results   Component Value Date    AST 23 12/09/2022    AST 17 08/05/2020     Lab Results   Component Value Date    ALT 20 12/09/2022    ALT 30 08/05/2020     Today's Vitals: LMP 07/09/2007 (LMP Unknown)   ----------------    I spent 26 minutes with this patient today. All changes were made via collaborative practice agreement with Ayla Graf MD. A copy of the  visit note was provided to the patient's provider(s).    A summary of these recommendations was sent via [employer].    Carleen GottliebD [facility]  Medication Therapy Management Practitioner   #[phone]    Telemedicine Visit Details  Type of service:  Video Conference via [employer]  Start Time: 7:30am  End Time: 7:56 AM     Medication Therapy Recommendations  Class 3 severe obesity due to excess calories with serious comorbidity and body mass index (BMI) of 45.0 to 49.9 in adult (H)    Rationale: Untreated condition - Needs additional medication therapy - Indication   Recommendation: Start Medication - buPROPion 150 MG 12 hr tablet - and naltrexone   Status: Accepted per CPA

## 2023-01-10 NOTE — PATIENT INSTRUCTIONS
Recommendations from today's MTM visit:                                                      Weight Management:    1.  Start naltrexone start 1/2 of the 50mg tablets for 3-7 days and increase to 1/2 tablet twice daily   2.  Start Bupropion 150mg ER every day     Smoking cessation: great job on reducing your number of cigarettes from this summer!    Follow-up: Return in 5 weeks (on 2/14/2023) for MTM Pharmacist Visit, video visit.    To schedule another MTM appointment, please call the clinic directly or you may call the MTM scheduling line at [phone] or toll-free at [phone].     My Clinical Pharmacist's contact information:                                                      It was a pleasure seeing you today!  Please feel free to contact me with any questions or concerns you have.      Lacy Forde, PharmD [facility]  Medication Therapy Management Practitioner   #[phone]    It was great to speak with you today.  I value your experience and would be very thankful for your time with providing feedback on our clinic survey. You may receive a survey via email or text message in the next few days.

## 2023-02-10 NOTE — PROGRESS NOTES
Medication Therapy Management (MTM) Encounter    ASSESSMENT:                            Medication Adherence/Access: No issues identified    Smoking cessation: Patient continues to use tobacco. She is reducing her number of cigarettes    Class III Obesity (BMI 40 or more)/prediabetes: Needs improvement.. Patient would benefit from the following medication change(s): stopping bupropion due to side effects and giving naltrexone a trial for 12 weeks for weight loss.. Did not see bupropion or naltrexone to have side effects of edema.  Per UpToDate on bupropion:  Gastrointestinal: Constipation (8% to 26%), nausea (9% to 18%), nausea and vomiting (23%), xerostomia (10% to 28%)     Hypertension/Edema: Patient would benefit from the following changes - use of compression stockings, limiting ibuprofen and salt, elevating legs.  Suggested she see a provider on her leg swelling and she does not want to do this at this time, likely her recent weight gain could be contributed to her increased leg swelling    Ankylosing spondylitis: discussed ibuprofen and risk of increasing leg swelling, she can not afford MRI which rheumatologist recommended at this time at next steps, she is working to reduce her ibuprofen    PLAN:                            Weight Management:  1.  Stop bupropion to see if it helps with mood, dry mouth and constipation  2.  Continue naltrexone 50mg every day     Edema/leg swellin.  Try to purchase compression stockings at home medical equipment store  2.  Reduce salt, reduce to 2000mg per day  3.  Limit ibuprofen  4.  Reach out to Dr. Graf if this does not improve    Smoking cessation: great job on reducing your number of cigarettes!    Follow-up: Return in 9 weeks (on 2023) for MTM Pharmacist Visit, phone visit.    SUBJECTIVE/OBJECTIVE:                          Katharina Mejias is a 62 year old female contacted via telephonefor a follow-up visit.  Today's visit is a follow-up MTM visit from  1/10/2023     Reason for visit: Mood is not happy and gained 10 lbs, her legs are always swollen and the swelling is not going down at night and constipation is horrible.  Not hungry and smoking is down to 1-2 cigarettes per day    Allergies/ADRs: Reviewed in chart  Past Medical History: Reviewed in chart  Tobacco: She reports that she has been smoking cigarettes. She started smoking about 52 years ago. She has a 20.00 pack-year smoking history. She has never used smokeless tobacco.Nicotine/Tobacco Cessation Plan:   Self help information given to patient  She is at 1-2 per day, at 1 pack per day in summer  Alcohol: 1-3 beverages / month    Medication Adherence/Access:   Medication barriers: Ozempic cost her $800 per month, she could not afford this, Saxenda was also expensive.  She has a deductible, $3500 out of pocket deductible which has started again for this year.  The patient fills medications at [place]: [facility] or [facility]    Constipated: January is very busy and eating late and doing take out this last month.  She does not like over the counter stool softeners as she had a bad reaction from it in the past    Class III Obesity (BMI 40 or more)/prediabetes: Current medication(s) include:  -naltrexone 50mg tablet every day   -bupropion 150mg ER every day  Could not afford Saxenda and Ozempic not covered.  She reports she is working 6 days for 10 hours for her job and not eating well or being able to exercise  She is not hungry all of the time, the medications are helping her cravings, using grapes and lightly salted nuts.   Side effects: swelling in her legs, not happy, constipated, dry mouth  She reports her daughter had bad interaction to Wellbutrin--mean and bitchy and she is feeling this way    Nutrition/Eating Habits: Patient reports she is not well with this, see above  Exercise/Activity: Patient reports not going well, this is a very busy month   Failed medications include:  "Phentermine: doctors does not want her on this anymore since she was on this for year and Topiramate: has history of kidney stones  Weight:  346 lb 1/10/2023 pre-medication weight  Weight today 355.8 lb 2/14/23    Wt Readings from Last 4 Encounters:   06/16/21 318 lb (144.2 kg)   03/23/21 314 lb (142.4 kg)   12/23/19 326 lb 6.4 oz (148.1 kg)   10/07/19 (!) 341 lb 12.8 oz (155 kg)     Estimated body mass index is 49.81 kg/m  as calculated from the following:    Height as of 6/16/21: 5' 7\" (1.702 m).    Weight as of 6/16/21: 318 lb (144.2 kg).    Lab Results   Component Value Date    A1C 5.7 12/09/2022    A1C 5.7 09/27/2021    A1C 6.0 12/14/2020    A1C 5.8 10/07/2019    A1C 5.6 06/26/2017     Lab Results   Component Value Date    AST 23 12/09/2022    AST 17 08/05/2020     Lab Results   Component Value Date    ALT 20 12/09/2022    ALT 30 08/05/2020       Hypertension/Edema: Current medications include losartan 100mg every day, spironolactone 50mg every day.  She continues to have leg swelling and worse when she works, better when she raises or elevates her legs. She has a prescription for compression stockings which has not picked up.  Patient does self-monitor blood pressure.  Patient reports no current medication side effects.  BP Readings from Last 3 Encounters:   10/04/21 130/70   03/23/21 116/64   03/16/20 124/64     Creatinine   Date Value Ref Range Status   11/22/2021 0.88 0.52 - 1.04 mg/dL Final   04/28/2021 0.99 0.52 - 1.04 mg/dL Final     Potassium   Date Value Ref Range Status   11/22/2021 4.2 3.4 - 5.3 mmol/L Final   04/28/2021 3.7 3.4 - 5.3 mmol/L Final     Ankylosing spondylitis: Current medication is ibuprofen 400mg three times daily--6-12 per day, she reports currently being down to 8 per day.  Acetaminophen does not work. She has been taking for years. Side effects: edema She reports the rheumatologist said to stay with ibuprofen or try Enbrel which she does not want to take.  She got a second opinion " from rheumatologist but wanted MRI which was too expensive.  She is cutting down on ibuprofen since she is stationary and working versus standing which usually causes her weight gain.  Creatinine   Date Value Ref Range Status   12/09/2022 0.88 0.51 - 0.95 mg/dL Final   04/28/2021 0.99 0.52 - 1.04 mg/dL Final     Hemoglobin   Date Value Ref Range Status   12/09/2022 14.1 11.7 - 15.7 g/dL Final   03/23/2021 13.8 11.7 - 15.7 g/dL Final   ]  Creatinine   Date Value Ref Range Status   12/09/2022 0.88 0.51 - 0.95 mg/dL Final   04/28/2021 0.99 0.52 - 1.04 mg/dL Final     Lab Results   Component Value Date    AST 23 12/09/2022    AST 17 08/05/2020     Lab Results   Component Value Date    ALT 20 12/09/2022    ALT 30 08/05/2020     She reports no changes to her other medications    Today's Vitals: LMP 07/09/2007 (LMP Unknown)   ----------------    I spent 19 minutes with this patient today. All changes were made via collaborative practice agreement with Ayla Graf MD. A copy of the visit note was provided to the patient's provider(s).    A summary of these recommendations was sent via [employer].    Lacy Forde, PharmD [facility]  Medication Therapy Management Practitioner   #[phone]    Telemedicine Visit Details  Type of service:  Telephone visit  Start Time: 7:35am  End Time: 7:54am     Medication Therapy Recommendations  Class 3 severe obesity due to excess calories with serious comorbidity and body mass index (BMI) of 45.0 to 49.9 in adult (H)    Current Medication: buPROPion (WELLBUTRIN XL) 150 MG 24 hr tablet (Discontinued)   Rationale: Undesirable effect - Adverse medication event - Safety   Recommendation: Discontinue Medication   Status: Accepted per CPA

## 2023-02-14 NOTE — PATIENT INSTRUCTIONS
Recommendations from today's MTM visit:                                                      Weight Management:  1.  Stop bupropion to see if it helps with mood, dry mouth and constipation  2.  Continue naltrexone 50mg every day     Edema/leg swellin.  Try to purchase compression stockings at home medical equipment store  2.  Reduce salt, reduce to 2000mg per day  3.  Limit ibuprofen  4.  Reach out to Dr. Graf if this does not improve  5.  I enclosed some information on this for you.    Smoking cessation: great job on reducing your number of cigarettes!    Follow-up: Return in 9 weeks (on 2023) for MTM Pharmacist Visit, phone visit.    To schedule another MTM appointment, please call the clinic directly or you may call the MTM scheduling line at [phone] or toll-free at [phone].     My Clinical Pharmacist's contact information:                                                      It was a pleasure seeing you today!  Please feel free to contact me with any questions or concerns you have.      Lacy Forde, [facility]D [facility]  Medication Therapy Management Practitioner   #[phone]    It was great to speak with you today.  I value your experience and would be very thankful for your time with providing feedback on our clinic survey. You may receive a survey via email or text message in the next few days.

## 2023-04-15 NOTE — PROGRESS NOTES
Medication Therapy Management (MTM) Encounter    ASSESSMENT:                            Medication Adherence/Access: cost is an issue with high deductible    Smoking cessation: Patient continues to use tobacco. Patient is not ready to quit using tobacco.     Class III Obesity (BMI 40 or more)/prediabetes: naltrexone ineffective, she had weight gain not loss on this.  Suggest Wegovy if covered by insurance will send this in.  Discussed herbal supplements have insufficient evidence in lower weight.  Per Natural Medicine Database see some possible lower blood sugar, blood pressure lowering with berberine, banaba and apple cider vinegar.  One drug interaction of berberine with losartan, recommend monitoring home blood pressure as she wants to star this.  Berberine      Hypertension/Edema: Patient is meeting blood pressure goal of < 130/80mmHg.  Discussed possible drug interaction with berberine and losartan, can change to alternative agent if needed if this effective for weight loss for patient.    Ankylosing spondylitis: discussed ibuprofen and risk of increasing leg swelling, she can not afford MRI which rheumatologist recommended at this time at next steps, she is working to reduce her ibuprofen    PLAN:                            Weight Management:  1.   Stop naltrexone 50mg every day   2.   Sent in prescription for Wegovy 2.5mg once weekly, see if insurance covers.  3.   Okay to take apple cider vinegar,   4.   Start Banaba extract 250mg and berberine 1200mg, with meals  4.   Berbine need to monitor for increase in blood pressure, prevents conversion of losartan to active form, if blood pressure is elevated please let me know     Edema/leg swellin.  Try to purchase compression stockings at home medical equipment store  2.  Continue to watch salt, reduce to 2000mg per day  3.  Limit ibuprofen    Smoking cessation: great job on reducing your number of cigarettes!    Follow-up: Return in 9 weeks (on 2023)  "for phone visit, MTM Pharmacist Visit. See Dr. Graf in September, appointment set up today    SUBJECTIVE/OBJECTIVE:                          Katharina Mejias is a 63 year old female called for a follow-up visit.  Today's visit is a follow-up MTM visit from 2023     Reason for visit: weight management.    Allergies/ADRs: Reviewed in chart  Past Medical History: Reviewed in chart  Tobacco: She reports that she has been smoking cigarettes. She started smoking about 52 years ago. She has a 20.00 pack-year smoking history. She has never used smokeless tobacco.Nicotine/Tobacco Cessation Plan:   She reports smoking 0-6 per day.  She leaves her apartment to smoke with friends in the summer  Alcohol: 1-3 beverages / month    Medication Adherence/Access:   Medication barriers: Ozempic cost her $800 per month, she could not afford this, Saxenda was also expensive.  She has a deductible, $3500 out of pocket deductible which has started again for this year.  The patient fills medications at [place]: No [facility] [place] or [facility]    Class III Obesity (BMI 40 or more)/prediabetes: Current medication(s) include:  -naltrexone 50mg tablet every day   She reports her weight is increasin.8lb and now 360.2 lb.  Her sister is taking something that is working.  2 vitamins: banaba extra 250mg, berberine 1200mg, she is wondering about taking this also wonders about [place] gummies (apple cider vinegar per quick online search)  Could not afford Saxenda and Ozempic not covered.  She was on Ozempic for a bit and tolerated. She reports she is working 6 days for 10 hours for her job and not eating well or being able to exercise, this is now going to be better.  Side efects: swelling in her legs  She reports her daughter had bad interaction to Wellbutrin--mean and bitchy and she is feeling this way    Nutrition/Eating Habits: Patient reports she is not well with this, see above, \"Omita\" weight program did not work, " "January last year through June-July  Exercise/Activity: Patient reports not going well, this is a very busy month   Failed medications include: Phentermine: doctors does not want her on this anymore since she was on this for year and Topiramate: has history of kidney stones  Naltrexone: not effective  Weight:  346 lb 1/10/2023 pre-medication weight  Weight today 360.2 lb    Wt Readings from Last 4 Encounters:   06/16/21 318 lb (144.2 kg)   03/23/21 314 lb (142.4 kg)   12/23/19 326 lb 6.4 oz (148.1 kg)   10/07/19 (!) 341 lb 12.8 oz (155 kg)     Estimated body mass index is 49.81 kg/m  as calculated from the following:    Height as of 6/16/21: 5' 7\" (1.702 m).    Weight as of 6/16/21: 318 lb (144.2 kg).    Lab Results   Component Value Date    A1C 5.7 12/09/2022    A1C 5.7 09/27/2021    A1C 6.0 12/14/2020    A1C 5.8 10/07/2019    A1C 5.6 06/26/2017     Lab Results   Component Value Date    AST 23 12/09/2022    AST 17 08/05/2020     Lab Results   Component Value Date    ALT 20 12/09/2022    ALT 30 08/05/2020       Hypertension/Edema: Current medications include losartan 100mg every day, spironolactone 50mg every day.  She continues to have leg swelling and worse when she works, better when she raises or elevates her legs. She has a prescription for compression stockings which has not picked up.  Patient does not self-monitor blood pressure.  Patient reports no current medication side effects.  BP Readings from Last 3 Encounters:   10/04/21 130/70   03/23/21 116/64   03/16/20 124/64     Creatinine   Date Value Ref Range Status   12/09/2022 0.88 0.51 - 0.95 mg/dL Final   04/28/2021 0.99 0.52 - 1.04 mg/dL Final     Potassium   Date Value Ref Range Status   12/09/2022 4.6 3.4 - 5.3 mmol/L Final   11/22/2021 4.2 3.4 - 5.3 mmol/L Final   04/28/2021 3.7 3.4 - 5.3 mmol/L Final     Ankylosing spondylitis: Current medication is ibuprofen 400mg three times daily--6-12 per day, she reports currently being down to 8 per day.  " Acetaminophen does not work. She has been taking for years. Side effects: edema She reports the rheumatologist said to stay with ibuprofen or try Enbrel which she does not want to take.  She got a second opinion from rheumatologist but wanted MRI which was too expensive.  She is cutting down on ibuprofen since she is stationary and working versus standing which usually causes her weight gain.  Creatinine   Date Value Ref Range Status   12/09/2022 0.88 0.51 - 0.95 mg/dL Final   04/28/2021 0.99 0.52 - 1.04 mg/dL Final     Hemoglobin   Date Value Ref Range Status   12/09/2022 14.1 11.7 - 15.7 g/dL Final   03/23/2021 13.8 11.7 - 15.7 g/dL Final   ]  Creatinine   Date Value Ref Range Status   12/09/2022 0.88 0.51 - 0.95 mg/dL Final   04/28/2021 0.99 0.52 - 1.04 mg/dL Final     Lab Results   Component Value Date    AST 23 12/09/2022    AST 17 08/05/2020     Lab Results   Component Value Date    ALT 20 12/09/2022    ALT 30 08/05/2020       Today's Vitals: LMP 07/09/2007 (LMP Unknown)   ----------------    I spent 30 minutes with this patient today. All changes were made via collaborative practice agreement with Ayla Graf MD. A copy of the visit note was provided to the patient's provider(s).    A summary of these recommendations was sent via [employer].    Carleen GottliebD [facility]  Medication Therapy Management Practitioner   #[phone]    Telemedicine Visit Details  Type of service:  Telephone visit  Start Time: 7:31am  End Time: 8:01am     Medication Therapy Recommendations  Class 3 severe obesity due to excess calories with serious comorbidity and body mass index (BMI) of 45.0 to 49.9 in adult (H)    Current Medication: naltrexone (DEPADE/REVIA) 50 MG tablet (Discontinued)   Rationale: Nonmedication therapy more appropriate - Unnecessary medication therapy - Indication   Recommendation: Discontinue Medication   Status: Accepted per CPA         Essential hypertension    Current Medication: losartan  (COZAAR) 100 MG tablet   Rationale: Medication interaction - Dosage too low - Effectiveness   Recommendation: Self-Monitoring - interaction with berberine   Status: Patient Agreed - Adherence/Education

## 2023-04-18 NOTE — PATIENT INSTRUCTIONS
Recommendations from today's MTM visit:                                                      Weight Management:  1.   Stop naltrexone 50mg every day   2.   Sent in prescription for Wegovy 2.5mg once weekly, see if insurance covers.  3.   Okay to take apple cider vinegar,   4.   Start Banaba extract 250mg and berberine 1200mg, with meals  4.   Berbine need to monitor for increase in blood pressure, prevents conversion of losartan to active form, if blood pressure is elevated please let me know     Edema/leg swellin.  Try to purchase compression stockings at home medical equipment store  2.  Continue to watch salt, reduce to 2000mg per day  3.  Limit ibuprofen    Smoking cessation: great job on reducing your number of cigarettes!    Follow-up: Return in 9 weeks (on 2023) for phone visit, MTM Pharmacist Visit. See Dr. Graf in September, appointment set up today    To schedule another MTM appointment, please call the clinic directly or you may call the MTM scheduling line at [phone] or toll-free at [phone].     My Clinical Pharmacist's contact information:                                                      It was a pleasure seeing you today!  Please feel free to contact me with any questions or concerns you have.      Lacy Forde, PharmD [facility]  Medication Therapy Management Practitioner   #[phone]    It was great to speak with you today.  I value your experience and would be very thankful for your time with providing feedback on our clinic survey. You may receive a survey via email or text message in the next few days.

## 2023-04-18 NOTE — TELEPHONE ENCOUNTER
Prior Authorization Retail Medication Request    Medication/Dose: Semaglutide-Weight Management (WEGOVY) 0.25 MG/0.5ML pen  ICD code (if different than what is on RX):    Class 3 severe obesity due to excess calories with serious comorbidity and body mass index (BMI) of 45.0 to 49.9 in adult (H) [E66.01, Z68.42]  - Primary       Pre-diabetes [R73.03]           Previously Tried and Failed:  See CHart  Rationale:  See Chart    Insurance Name:  [employer]  Insurance ID:  [id number]       Pharmacy Information (if different than what is on RX)  [facility] [place], [place]  Phone:  [phone]

## 2023-04-21 NOTE — TELEPHONE ENCOUNTER
Prior Authorization Approval    Authorization Effective Date: 3/22/2023  Authorization Expiration Date: 11/17/2023  Medication: Semaglutide-Weight Management (WEGOVY) 0.25 MG/0.5ML pen - Initiated  Approved Dose/Quantity:   Reference #: [id number]   Insurance Company: [employer] - Phone [phone] Fax [phone]  Which Pharmacy is filling the prescription (Not needed for infusion/clinic administered): [id number] - [facility] [address]  Pharmacy Notified: Yes  Patient Notified: Yes

## 2023-05-30 NOTE — TELEPHONE ENCOUNTER
Patient Quality Outreach Health Maintenance - PAL RN    Summary:    PAL RN contacted pt regarding overdue health maintenance    Patient is due/failing the following:   Mammogram  Colon Cancer Screening  Physical Preventive Adult Physical      Topic Date Due     Pneumococcal Vaccine (1 - PCV) Never done     Zoster (Shingles) Vaccine (2 of 2) 11/29/2021       Health Maintenance Due   Topic Date Due     Pneumococcal Vaccine: Pediatrics (0 to 5 Years) and At-Risk Patients (6 to 64 Years) (1 - PCV) Never done     COLORECTAL CANCER SCREENING  Never done     LUNG CANCER SCREENING  Never done     ZOSTER IMMUNIZATION (2 of 2) 11/29/2021     MAMMO SCREENING  12/14/2021     ANNUAL REVIEW OF HM ORDERS  05/05/2022     ADVANCE CARE PLANNING  06/27/2022     HPV TEST  10/02/2022     PAP  10/02/2022     YEARLY PREVENTIVE VISIT  10/04/2022       Type of outreach:    Sent [facility] message.    - Advised patient if any questions or concerns comes up to call the PAL RN.   - Patient given opportunity to ask questions and at this time there is nothing further needed.     Questions for provider review:    None                                                                                     Chart routed to none.    Libby Hernandez, RN

## 2023-06-12 NOTE — TELEPHONE ENCOUNTER
Patient Quality Outreach Health Maintenance - PAL RN    Summary:    PAL RN contacted pt regarding overdue health maintenance    Patient is due/failing the following:   Physical Preventive Adult Physical    Health Maintenance Due   Topic Date Due     Pneumococcal Vaccine: Pediatrics (0 to 5 Years) and At-Risk Patients (6 to 64 Years) (1 - PCV) Never done     COLORECTAL CANCER SCREENING  Never done     LUNG CANCER SCREENING  Never done     ZOSTER IMMUNIZATION (2 of 2) 11/29/2021     MAMMO SCREENING  12/14/2021     ANNUAL REVIEW OF HM ORDERS  05/05/2022     ADVANCE CARE PLANNING  06/27/2022     HPV TEST  10/02/2022     PAP  10/02/2022     YEARLY PREVENTIVE VISIT  10/04/2022       Type of outreach:    MC message was sent on 5/30 and patient has not responded-routing to TC to call to schedule a preventive annual visit.    - Advised patient if any questions or concerns comes up to call the PAL RN.   - Patient given opportunity to ask questions and at this time there is nothing further needed.     Questions for provider review:    None                                                                                     Chart routed to TC to call to schedule.    Libby Hernandez, RN

## 2023-06-12 NOTE — TELEPHONE ENCOUNTER
Patient was called to schedule appt. She said she did not want to schedule now since her insurance is changing. She will call back to schedule after she finds out about her insurance.

## 2023-08-15 NOTE — ED PROVIDER NOTES
History     Chief Complaint:  Nausea     HPI   Katharina Mejias is a 63 year old female, present with daughter Debbie, with a history of dyslipidemia, essential hypertension, and lymphedema who presents for a evaluation of nausea. The patient reports nausea, fatigue, and diarrhea started last Friday. States that on Saturday she started feeling better, but since Saturday night, the nausea and fatigue has been coming in waves. On Monday morning, the patient reports eating a toast and again she started feeling fatigue and nausea with dry heaving. Reports that last night she woke up every 30 minutes and was dry heaving. Has been having constant cramps in her lower abdomen. Last night, the patient had a temperature of 99.5 F. Adds that she didn't eat any exotic foods. Denies vomiting, hematemesis, recent antibiotic use and recent hospital admissions.     Independent Historian:    Daughter - They report as stated above.     Review of External Notes:  None      Medications:    Albuterol   Famotidine  Losartan  Rosuvastatin   Semaglutide  Spironolactone    Past Medical History:    Acute and subacute iridocyclitis, unspecified  Anklosing spondylitis   Hypertension   Lumbago   Tobacco use disorder   Cataract   Ovarian cyst   Dyslipidemia   Lymphedema  Anxiety   Essential hypertension    Past Surgical History:    Eye surgery, cataract     Physical Exam   Patient Vitals for the past 24 hrs:   BP Temp Temp src Pulse Resp SpO2   08/15/23 0945 (!) 167/73 -- -- 91 18 94 %   08/15/23 0756 (!) 172/91 98  F (36.7  C) Temporal 94 20 95 %      Physical Exam  General: Alert, no acute distress; well appearing  HEENT:  Moist mucous membranes.  Posterior oropharynx clear, no exudates.  Conjunctiva normal.   CV:  RRR, no m/r/g, skin warm and well perfused  Pulm:  CTAB, no wheezes/ronchi/rales.  No acute distress, breathing comfortably  GI:  Soft, nontender, nondistended.  No rebound or guarding.    MSK:  Moving all extremities.  No focal  areas of edema, erythema  Skin:  WWP, no rashes, skin color normal, no diaphoresis    Emergency Department Course   Laboratory:  Labs Ordered and Resulted from Time of ED Arrival to Time of ED Departure   COMPREHENSIVE METABOLIC PANEL - Abnormal       Result Value    Sodium 138      Potassium 3.8      Chloride 102      Carbon Dioxide (CO2) 26      Anion Gap 10      Urea Nitrogen 22.5      Creatinine 1.26 (*)     Calcium 10.8 (*)     Glucose 120 (*)     Alkaline Phosphatase 90      AST 30      ALT 24      Protein Total 6.6      Albumin 4.1      Bilirubin Total 0.6      GFR Estimate 47 (*)    CBC WITH PLATELETS AND DIFFERENTIAL - Abnormal    WBC Count 11.8 (*)     RBC Count 4.16      Hemoglobin 12.8      Hematocrit 37.8      MCV 91      MCH 30.8      MCHC 33.9      RDW 12.6      Platelet Count 268      % Neutrophils 79      % Lymphocytes 14      % Monocytes 6      % Eosinophils 0      % Basophils 0      % Immature Granulocytes 1      NRBCs per 100 WBC 0      Absolute Neutrophils 9.3 (*)     Absolute Lymphocytes 1.6      Absolute Monocytes 0.7      Absolute Eosinophils 0.0      Absolute Basophils 0.0      Absolute Immature Granulocytes 0.1      Absolute NRBCs 0.0        Emergency Department Course & Assessments:     Interventions:  Medications   0.9% sodium chloride BOLUS (0 mLs Intravenous Stopped 8/15/23 0949)      Assessments:  08 I obtained history and examined the patient as noted above.   09 I rechecked and updated the patient. The patient reports feeling improved.     Independent Interpretation (X-rays, CTs, rhythm strip):  None    Consultations/Discussion of Management or Tests:  None       Social Determinants of Health affecting care:  None     Disposition:  The patient was discharged to home.     Impression & Plan    Medical Decision Makin-year-old female presenting with daughter with concerns for nausea/vomiting and diarrhea.  Initially had diarrhea on Friday associated with nausea/vomiting.   Diarrhea has improved but patient continues to feel nauseous with eating and has been dry heaving.  Reports lower abdominal cramping but she has a completely benign abdominal exam here without any tenderness throughout.  She is afebrile and vitally stable. Her lab studies above show mild leukocytosis (likely acute stress reaction), mild MAVIS (likely prerenal due to poor oral intake), mild hypercalcemia.  She had improved symptoms with fluids and Zofran.  Repeat abdominal exam remains benign.  Doubt surgical abdomen or intra-abdominal catastrophe requiring CT imaging and patient agrees.  Suspect gastroenteritis given nausea/vomiting and diarrhea, likely viral.  Doubt bacterial etiology for her symptoms including C diff.  She is able to tolerate PO challenge here in the ER and is safe to discharge home with supportive care discussed at bedside.  She is comfortable with this plan and will return for any new or worsening symptoms.  All questions were answered prior to discharge.     Diagnosis:    ICD-10-CM    1. Nausea vomiting and diarrhea  R11.2     R19.7       2. MAVIS (acute kidney injury) (H)  N17.9       3. Elevated blood pressure reading with diagnosis of hypertension  I10          Discharge Medications:  Discharge Medication List as of 8/15/2023  9:49 AM        START taking these medications    Details   ondansetron (ZOFRAN ODT) 4 MG ODT tab Take 1 tablet (4 mg) by mouth every 8 hours as needed for nausea or vomiting, Disp-10 tablet, R-0, E-Prescribe            Scribe Disclosure:  Gely KRAUS, am serving as a scribe at 8:38 AM on 8/15/2023 to document services personally performed by Oscar Gil MD based on my observations and the provider's statements to me.    Aisha KRAUS, am serving as a scribe  at 8:53 AM on 8/15/2023 to document services personally performed by Oscar Gil MD based on my observations and the provider's statements to me.    8/15/2023   Louise  Oscar Arvizu MD              [facility] Center[facility] MD  08/15/23 7550

## 2023-08-15 NOTE — ED TRIAGE NOTES
Patient presents to the ED reporting ongoing nausea and fatigue. Patient states had nausea and diarrhea beginning on Friday. Also reports cough and congestion.

## 2023-08-16 PROBLEM — N20.0 BILATERAL KIDNEY STONES: Status: ACTIVE | Noted: 2023-01-01

## 2023-08-16 PROBLEM — N20.1 URETERAL STONE: Status: ACTIVE | Noted: 2023-01-01

## 2023-08-16 PROBLEM — R11.2 NAUSEA AND VOMITING, UNSPECIFIED VOMITING TYPE: Status: ACTIVE | Noted: 2023-01-01

## 2023-08-16 NOTE — ED TRIAGE NOTES
Pt states she was here yesterday with fever, nausea and vomiting. Feels a little short of breath today. Experiencing heartburn and dry mouth. No improvement with ODT zofran. Last dose at 0330     Triage Assessment       Row Name 08/16/23 0652       Triage Assessment (Adult)    Airway WDL WDL       Respiratory WDL    Respiratory WDL X  feels a little short of breath       Skin Circulation/Temperature WDL    Skin Circulation/Temperature WDL WDL       Cardiac WDL    Cardiac WDL WDL       Peripheral/Neurovascular WDL    Peripheral Neurovascular WDL WDL       Cognitive/Neuro/Behavioral WDL    Cognitive/Neuro/Behavioral WDL WDL

## 2023-08-16 NOTE — H&P
[facility]    Hospitalist History and Physical    Name: Katharina Mejias    MRN: [id number]  YOB: 1960    Age: 63 year old  Date of Admission:  8/16/2023  Date of Service (when I saw the patient): 08/16/23    Assessment & Plan   Katharina Mejias is a 63 year old female with PMH significant for HTN, current tobacco abuse, chronic shortness of breath, chronic back pain, GERD, and hyperlipidemia who presents to the ED for the second time in two days for ongoing nausea and dry heaving.    ED workup reveals: hypertensive otherwise VSS, negative influenza A/B, COVID, RSV, creatinine of 1.27, calcium of 11.0, glucose of 126, mild leukocytosis of 11.8, UA shows 20 of ketones, small blood, 10 of protein, few bacteria, mucus present, 74 RBCs, 8 WBC, and 2 squamous epithelium, and CT of abdomen/pelvis shows 2 adjacent 2 to 3 mm right proximal/mid ureteral calculi resulting in moderate upstream dilatation, additional 3 mm left proximal/mid ureteral calculus resulting in mild to moderate upstream dilatation, bilateral punctate nonobstructing calculi, abnormally thick endometrium, 1.1 cm, for postmenopausal patient with internal heterogenicity and possible small polyp, recommend follow-up with nonemergent pelvic ultrasound.    #Bilateral ureteral stones  #MAVIS: onset the evening of 8/11 of nausea with dry heaving but no emesis, lower abdominal pain the last few days prior to admission but no increased back pain, and feeling generally unwell. Reports low grade fever of 99.5 with chills at home. No dysuria, hematuria, urinary urgency or frequency. H/o kidney stones with last episode a few years ago and none have previously required intervention.  Febrile, mild leukocytosis of 11.8, UA shows hematuria but otherwise not consistent with UTI.  CT of abdomen/pelvis shows 2 adjacent 2 to 3 mm right proximal/mid ureteral calculi resulting in moderate upstream dilatation, additional 3 mm left  "proximal/mid ureteral calculus resulting in mild to moderate upstream dilatation. Creatinine elevated at 1.27, baseline around 0.88.   -Urology consult, notified by ED and plan for stent placement this evening  -daily flomax  -strain urine  -monitor I&Os  -LR at 125 ml/hour   -pain control  -PRN antiemetics  -NPO for procedure, ADAT after     #Hypertension: BP elevated, suspect partially related to pain  -hold Losartan and Spironolactone due to MAVIS  -PRN IV Hydralazine     #HLD: resume PTA Rosuvastatin upon discharge    #GERD: resume Pepcid BID    #Chronic shortness of breath: no formal diagnosis of asthma or COPD  -PRN albuterol neb for shortness of breath    #Tobacco abuse: smokes about 1/2 ppd  -nicotine patch available     #Morbid obesity: noted, complicates care    Clinically Significant Risk Factors Present on Admission           # Hypercalcemia: Highest Ca = 11 mg/dL in last 2 days, will monitor as appropriate        # Hypertension: Noted on problem list      # Severe Obesity: Estimated body mass index is 56.85 kg/m  as calculated from the following:    Height as of this encounter: 1.702 m (5' 7\").    Weight as of this encounter: 164.7 kg (363 lb).              DVT Prophylaxis: Ambulate every shift  Code Status: Full Code, discussed with patient  Disposition: Expected discharge in 24-48 hours, will admit to observation     Primary Care Physician   Ayla Graf    Chief Complaint   Nausea and dry heaving     History obtained from discussion with ED provider, Dr. Hammond, chart review, and interview with patient.     History of Present Illness   Katharina Mejias is a 63 year old female who presents with nausea and dry heaving.  Patient reports onset Friday evening.  She reports feeling \"queasy\" the last couple of days and generally hurt all over in her abdomen without focal area of pain.  She reports chronic back pain that is not worse than her baseline.  Notes low-grade fever of 99.5 with associated " chills. She has baseline shortness of breath but denies recent chest pain.  Denies dysuria, urinary urgency or frequency, or hematuria.  Patient has had previous kidney stones that she has been able to pass on her own and has not had any previous interventions.  She states her last episode of a kidney stone was a few years ago and she had more severe pain in her abdomen than currently.  Patient intermittently falls asleep during interview due to being sleepy.  Initially seen in the ED on 8/15 for symptoms felt to be related to gastroenteritis but due to ongoing symptoms return to the ED today.    Past Medical History    Past Medical History:   Diagnosis Date    Acute and subacute iridocyclitis, unspecified 8/05, 4/06    Ankylosing spondylitis (H)     Hypertension     Lumbago     Other and unspecified ovarian cyst 1985    Other cataract     Tobacco use disorder      Past Surgical History   Past Surgical History:   Procedure Laterality Date    EYE SURGERY      cataract    NO HISTORY OF SURGERY       Prior to Admission Medications   Prior to Admission Medications   Prescriptions Last Dose Informant Patient Reported? Taking?   albuterol (PROAIR HFA/PROVENTIL HFA/VENTOLIN HFA) 108 (90 Base) MCG/ACT inhaler prn at prn  No Yes   Sig: Inhale 2 puffs into the lungs every 4 hours as needed for shortness of breath / dyspnea or wheezing   diphenhydrAMINE (BENADRYL) 25 MG tablet prn at prn  Yes Yes   Sig: Take 50 mg by mouth every 6 hours as needed for itching or allergies   famotidine (PEPCID) 20 MG tablet 8/16/2023 at am  No Yes   Sig: Take 1 tablet (20 mg) by mouth 2 times daily   ibuprofen (ADVIL/MOTRIN) 200 MG tablet prn at prn  Yes Yes   Sig: Take 400 mg by mouth every 12 hours as needed for mild pain   losartan (COZAAR) 100 MG tablet 8/16/2023 at am  No Yes   Sig: Take 1 tablet (100 mg) by mouth daily   multivitamin w/minerals (THERA-VIT-M) tablet 8/15/2023 at pm  Yes Yes   Sig: Take 1 tablet by mouth daily   ondansetron  (ZOFRAN ODT) 4 MG ODT tab prn at prn  No Yes   Sig: Take 1 tablet (4 mg) by mouth every 8 hours as needed for nausea or vomiting   order for DME   No No   Sig: Equipment being ordered: Jobst or similar support stockings, low pressure 15-20 mmHg or per patient preference. Knee high.   Patient not taking: Reported on 11/22/2022   rosuvastatin (CRESTOR) 5 MG tablet 8/16/2023 at am  No Yes   Sig: Take 1 tablet (5 mg) by mouth daily   spironolactone (ALDACTONE) 50 MG tablet 8/16/2023 at am  No Yes   Sig: Take 1 tablet (50 mg) by mouth daily      Facility-Administered Medications: None     Allergies   Allergies   Allergen Reactions    Chantix [Varenicline]      Suicidal thoughts    Mold      Vomiting and diarrhea     Pcn [Penicillins]      Social History   Social History     Tobacco Use    Smoking status: Every Day     Packs/day: 0.50     Years: 40.00     Pack years: 20.00     Types: Cigarettes     Start date: 1/1/1971    Smokeless tobacco: Never   Substance Use Topics    Alcohol use: Yes     Comment: rarely     Social History     Social History Narrative    Not on file     Family History   Family history reviewed with patient and significant for sister with kidney stones.     Review of Systems   A Comprehensive greater than 10 system review of systems was carried out.  Pertinent positives and negatives are noted above. Otherwise negative for contributory information.    Physical Exam   Temp: 98.8  F (37.1  C) Temp src: Oral BP: (!) 182/99 Pulse: 90   Resp: 18 SpO2: 90 % O2 Device: None (Room air)    Vital Signs with Ranges  Temp:  [98.4  F (36.9  C)-99  F (37.2  C)] 98.8  F (37.1  C)  Pulse:  [88-90] 90  Resp:  [18-20] 18  BP: (181-209)/() 182/99  SpO2:  [90 %-94 %] 90 %  363 lbs 0 oz    GEN:  Alert, oriented x 3, appears comfortable, no overt distress  HEENT:  Normocephalic/atraumatic, no scleral icterus, no nasal discharge, mouth moist.  CV:  Regular rate and rhythm, no murmur or JVD.  S1 + S2 noted, no S3 or  S4.  LUNGS:  Clear to auscultation bilaterally without rales/rhonchi/wheezing/retractions.  Symmetric chest rise on inhalation noted.  ABD:  Active bowel sounds, soft, morbidly obese, mild lower abdominal tenderness with palpation, non-distended.  No rebound/guarding/rigidity.  EXT:  No LE edema.  No cyanosis.  No acute joint synovitis noted.  SKIN:  Dry to touch, no exanthems noted in the visualized areas.  NEURO:  Symmetric muscle strength, sensation to touch grossly intact.  Coordination symmetric on general exam.  No new focal deficits appreciated.    Data   Data reviewed today:  I personally reviewed labs and imaging from this visit.   Results for orders placed or performed during the hospital encounter of 08/16/23   CT Abdomen Pelvis w Contrast     Status: None    Narrative    CT ABDOMEN AND PELVIS WITH CONTRAST 8/16/2023 8:44 AM    CLINICAL HISTORY: Abdominal pain, vomiting.    TECHNIQUE: CT scan of the abdomen and pelvis was performed following  injection of IV contrast. Multiplanar reformats were obtained. Dose  reduction techniques were used.    CONTRAST: 100mL Isovue-370    COMPARISON: Abdominal radiograph 3/23/2021.    FINDINGS:   LOWER CHEST: No infiltrates or effusions.    HEPATOBILIARY: No significant mass or bile duct dilatation. No  calcified gallstones.     PANCREAS: No significant mass, duct dilatation, or inflammatory  change.    SPLEEN: Normal size.    ADRENAL GLANDS: No significant nodules.    KIDNEYS/BLADDER: There are two adjacent stones (2-3 mm) in the right  proximal/mid ureter with upstream moderate pelvocaliectasis.  Additional 3 mm calculus in the left distal proximal/mid with mild  upstream dilatation. Nonobstructing bilateral upper pole calculi.    BOWEL: Diverticulosis in the colon. No acute inflammatory change. No  obstruction.     PELVIC ORGANS: Uterus is present. The endometrium measures 1.2 cm with  lower uterine segment hyperdense nodule measuring 0.6 cm (3/193). No  adnexal  mass.    ADDITIONAL FINDINGS: No ascites. A few prominent right lower quadrant  mesenteric lymph nodes, likely reactive. Nonaneurysmal abdominal  aorta.    MUSCULOSKELETAL: Degenerative changes of the lumbar spine and pubic  symphysis. Grade 1 anterolisthesis of L4 on L5. No aggressive osseous  lesion.      Impression    IMPRESSION:   1. Two adjacent 2-3 mm right proximal/mid ureteral calculi resulting  in moderate upstream dilatation.    2. Additional 3 mm left proximal/mid ureteral calculus resulting in  mild to moderate upstream dilatation.    3. Bilateral punctate nonobstructing calculi.    4. Abnormally thickened endometrium (1.1 cm) for postmenopausal  patient with internal heterogeneity and possible small polyp.  Recommend follow-up with nonemergent pelvic ultrasound.    RADHA SAMPSON MD         SYSTEM ID:  [id number]   [facility] [facility]     Status: None    Narrative    The following orders were created for panel order [facility] [facility].  Procedure                               Abnormality         Status                     ---------                               -----------         ------                     Extra Blue Top Tube[[id number]]                              Final result               Extra Red Top Tube[[id number]]                               Final result                 Please view results for these tests on the individual orders.   Symptomatic Influenza A/B, RSV, & SARS-CoV2 PCR (COVID-19) Nasopharyngeal     Status: Normal    Specimen: Nasopharyngeal; Swab   Result Value Ref Range    Influenza A PCR Negative Negative    Influenza B PCR Negative Negative    RSV PCR Negative Negative    SARS CoV2 PCR Negative Negative    Narrative    Testing was performed using the Xpert Xpress CoV2/Flu/RSV Assay on the [employer] GeneXpert Instrument. This test should be ordered for the detection of SARS-CoV-2, influenza, and RSV viruses in individuals who meet clinical and/or epidemiological criteria. Test performance  is unknown in asymptomatic patients. This test is for in vitro diagnostic use under the FDA EUA for laboratories certified under CLIA to perform high or moderate complexity testing. This test has not been FDA cleared or approved. A negative result does not rule out the presence of PCR inhibitors in the specimen or target RNA in concentration below the limit of detection for the assay. If only one viral target is positive but coinfection with multiple targets is suspected, the sample should be re-tested with another FDA cleared, approved, or authorized test, if coinfection would change clinical management. This test was validated by the [facility]. These laboratories are certified under the Clinical Laboratory Improvement Amendments of 1988 (CLIA-88) as qualified to perform high complexity laboratory testing.   Comprehensive metabolic panel     Status: Abnormal   Result Value Ref Range    Sodium 140 136 - 145 mmol/L    Potassium 3.8 3.4 - 5.3 mmol/L    Chloride 103 98 - 107 mmol/L    Carbon Dioxide (CO2) 26 22 - 29 mmol/L    Anion Gap 11 7 - 15 mmol/L    Urea Nitrogen 22.7 8.0 - 23.0 mg/dL    Creatinine 1.27 (H) 0.51 - 0.95 mg/dL    Calcium 11.0 (H) 8.8 - 10.2 mg/dL    Glucose 126 (H) 70 - 99 mg/dL    Alkaline Phosphatase 103 35 - 104 U/L    AST 35 0 - 45 U/L    ALT 34 0 - 50 U/L    Protein Total 7.3 6.4 - 8.3 g/dL    Albumin 4.4 3.5 - 5.2 g/dL    Bilirubin Total 0.7 <=1.2 mg/dL    GFR Estimate 47 (L) >60 mL/min/1.73m2   CBC (platelets, no diff)     Status: Abnormal   Result Value Ref Range    WBC Count 11.8 (H) 4.0 - 11.0 10e3/uL    RBC Count 4.55 3.80 - 5.20 10e6/uL    Hemoglobin 13.9 11.7 - 15.7 g/dL    Hematocrit 41.5 35.0 - 47.0 %    MCV 91 78 - 100 fL    MCH 30.5 26.5 - 33.0 pg    MCHC 33.5 31.5 - 36.5 g/dL    RDW 12.7 10.0 - 15.0 %    Platelet Count 283 150 - 450 10e3/uL   Extra Blue Top Tube     Status: None   Result Value Ref Range    Hold Specimen [facility]    Extra Red Top Tube     Status:  None   Result Value Ref Range    Hold Specimen [facility]    UA with Microscopic     Status: Abnormal   Result Value Ref Range    Color Urine Yellow Colorless, Straw, Light Yellow, Yellow    Appearance Urine Clear Clear    Glucose Urine Negative Negative mg/dL    Bilirubin Urine Negative Negative    Ketones Urine 20 (A) Negative mg/dL    Specific Gravity Urine 1.018 1.003 - 1.035    Blood Urine Small (A) Negative    pH Urine 6.0 5.0 - 7.0    Protein Albumin Urine 10 (A) Negative mg/dL    Urobilinogen Urine Normal Normal, 2.0 mg/dL    Nitrite Urine Negative Negative    Leukocyte Esterase Urine Negative Negative    Bacteria Urine Few (A) None Seen /HPF    Mucus Urine Present (A) None Seen /LPF    RBC Urine 74 (H) <=2 /HPF    WBC Urine 8 (H) <=5 /HPF    Squamous Epithelials Urine 2 (H) <=1 /HPF     Lala Valle PA-C  [facility]  Securely message with the Vocera Web Console (learn more here)  Text page via [employer] Paging/Directory   Clear

## 2023-08-16 NOTE — CONSULTS
[facility] Consultation by [facility]    Katharina Mejias MRN# [id number]   Age: 63 year old YOB: 1960     Date of Admission:  8/16/2023    Reason for consult: Bilateral ureteral stones       Requesting PA/MD: Dr. Hammond       Level of consult: Consult, follow and place orders             Impression and Plan:   Impression/Assessment:   Katharina Mejias is a 63 year old female with 2- 3 mm right ureteral stones and a 3 mm left proximal ureteral stone  MAVIS  Nausea      Plan:   -NPO.  -plan to OR today for cystoscopy and bilateral ureteral stent placement.  Will need to return the OR in several weeks to reduce definitive stone management to likely include cystoscopy, bilateral ureteroscopy with laser lithotripsy and stone basketing, and bilateral ureteral stent exchange.  -We discussed the that was bilateral ureteral stones, concern without intervention or possible complete obstruction bilaterally and going into renal failure.  Patient does have mild MAVIS at this time, but not significantly elevated creatinine.  -IVF fluids  -Flomax 0.4mg QHS - monitor for orthostatic hypotension.   -Strain urine.  -Pain and nausea medication per primary service.  -Possible side effects with an indwelling ureteral stent such as urgency and frequency of urination, dysuria, hematuria, symptoms of urine reflux, and some achiness in the side. Indwelling ureteral stents need to be exchanged every three months or removed by three months.    -Thank you for involving us in the care of this patient. We will continue to follow along.     China Daniel PA-C  [facility]   [phone]               Chief Complaint:   Nausea and vomiting     History is obtained from the patient and EMR.         History of Present Illness:   This patient is a 63 year old female who presented to the emergency department this morning due to nausea and vomiting.  She was seen in the ER yesterday for similar symptoms and thought  "to have gastroenteritis.  Patient began to have issues with frequent bowel movements and nausea with vomiting after eating [employer] on Friday.  She does note that she felt much better Monday morning and her symptoms persisted.  Patient has had nausea and vomiting.  She denies significant flank pain.  She endorses a low-grade temp of 99.  She also endorses chills.    Urinalysis is not super convincing for infection.  Patient underwent CT imaging which does show bilateral ureteral stones.  She does have 2 ureteral stones on the right and 1 on the left.  Mild MAVIS, with creatinine rise to 1.27 with an EGFR of 35.  Her baseline creatinine is around 0.8.    She does endorse a history of nephrolithiasis.  She is always been able to pass her stones.  Family history of nephrolithiasis in her sister who \"gets a lot of stones.\"    Endorses some mild shortness of breath, denies chest pain.  WBC 11.8.  Influenza A and B, RSV, and COVID are all negative.  Denies hematuria or dysuria.          Past Medical History:     Past Medical History:   Diagnosis Date    Acute and subacute iridocyclitis, unspecified 8/05, 4/06    Ankylosing spondylitis (H)     Hypertension     Lumbago     Other and unspecified ovarian cyst 1985    Other cataract     Tobacco use disorder              Past Surgical History:     Past Surgical History:   Procedure Laterality Date    EYE SURGERY      cataract    NO HISTORY OF SURGERY               Social History:     Social History     Tobacco Use    Smoking status: Every Day     Packs/day: 0.50     Years: 40.00     Pack years: 20.00     Types: Cigarettes     Start date: 1/1/1971    Smokeless tobacco: Never   Substance Use Topics    Alcohol use: Yes     Comment: rarely             Family History:     Family History   Problem Relation Age of Onset    Heart Disease Mother         d. age 70 CHF, thought due to very low K    Blood Disease Mother          \"borderline hemophilia\", diagnosed after bleeding ulcer, " "treated with vit K, then had blood clot    Prostate Cancer Father     Heart Disease Maternal Uncle     Diabetes Daughter     Depression Daughter     Thyroid Disease Daughter     Obesity Daughter     Atrial fibrillation Sister         Hx of Ablation Surgery    Heart Disease Sister         Stent Placement for occluded coronary artery    Anemia Sister     Hypertension Sister     Anesthesia Reaction Sister     Thyroid Disease Sister     Obesity Sister     Anemia Sister      Family history of nephrolithiasis in her sister.         Allergies:     Allergies   Allergen Reactions    Chantix [Varenicline]      Suicidal thoughts    Mold      Vomiting and diarrhea     Pcn [Penicillins]              Medications:     Current Facility-Administered Medications   Medication    ceFAZolin (ANCEF) 3 g in  mL intermittent infusion    ceFAZolin (ANCEF) 3 g in  mL intermittent infusion    glucose gel 15-30 g    Or    dextrose 50 % injection 25-50 mL    Or    glucagon injection 1 mg    dextrose 50 % injection 50 mL    tamsulosin (FLOMAX) capsule 0.4 mg             Review of Systems:   A comprehensive 10-point review of systems was performed and found to be negative except as described in the HPI.     BP (!) 182/99 (BP Location: Left arm)   Pulse 90   Temp 98.8  F (37.1  C) (Oral)   Resp 18   Ht 1.702 m (5' 7\")   Wt (!) 165.6 kg (365 lb)   LMP 07/09/2007 (LMP Unknown)   SpO2 90%   BMI 57.17 kg/m    PSYCH: NAD  EYES: EOMI  MOUTH: MMM  NECK: Supple, no notable adenopathy  RESP: Unlabored breathing  CARDIAC: LE edema, regular radial pulse  SKIN: Warm, no rashes  ABD: soft, Nontender, no CVA tenderness noted bilaterally  NEURO: AAO x3  URO: Urinating own          Data:     Lab Results   Component Value Date    WBC 11.8 (H) 08/16/2023    HGB 13.9 08/16/2023    HCT 41.5 08/16/2023    MCV 91 08/16/2023     08/16/2023     Lab Results   Component Value Date    CR 1.27 (H) 08/16/2023    CR 1.26 (H) 08/15/2023     Recent " Labs   Lab 08/16/23  0803   COLOR Yellow   APPEARANCE Clear   URINEGLC Negative   URINEBILI Negative   URINEKETONE 20*   SG 1.018   URINEPH 6.0   PROTEIN 10*   NITRITE Negative   LEUKEST Negative   RBCU 74*   WBCU 8*        IMAGING    CT Abdomen Pelvis w Contrast    Result Date: 8/16/2023  CT ABDOMEN AND PELVIS WITH CONTRAST 8/16/2023 8:44 AM CLINICAL HISTORY: Abdominal pain, vomiting. TECHNIQUE: CT scan of the abdomen and pelvis was performed following injection of IV contrast. Multiplanar reformats were obtained. Dose reduction techniques were used. CONTRAST: 100mL Isovue-370 COMPARISON: Abdominal radiograph 3/23/2021. FINDINGS: LOWER CHEST: No infiltrates or effusions. HEPATOBILIARY: No significant mass or bile duct dilatation. No calcified gallstones. PANCREAS: No significant mass, duct dilatation, or inflammatory change. SPLEEN: Normal size. ADRENAL GLANDS: No significant nodules. KIDNEYS/BLADDER: There are two adjacent stones (2-3 mm) in the right proximal/mid ureter with upstream moderate pelvocaliectasis. Additional 3 mm calculus in the left distal proximal/mid with mild upstream dilatation. Nonobstructing bilateral upper pole calculi. BOWEL: Diverticulosis in the colon. No acute inflammatory change. No obstruction. PELVIC ORGANS: Uterus is present. The endometrium measures 1.2 cm with lower uterine segment hyperdense nodule measuring 0.6 cm (3/193). No adnexal mass. ADDITIONAL FINDINGS: No ascites. A few prominent right lower quadrant mesenteric lymph nodes, likely reactive. Nonaneurysmal abdominal aorta. MUSCULOSKELETAL: Degenerative changes of the lumbar spine and pubic symphysis. Grade 1 anterolisthesis of L4 on L5. No aggressive osseous lesion.     IMPRESSION: 1. Two adjacent 2-3 mm right proximal/mid ureteral calculi resulting in moderate upstream dilatation. 2. Additional 3 mm left proximal/mid ureteral calculus resulting in mild to moderate upstream dilatation. 3. Bilateral punctate nonobstructing  calculi. 4. Abnormally thickened endometrium (1.1 cm) for postmenopausal patient with internal heterogeneity and possible small polyp. Recommend follow-up with nonemergent pelvic ultrasound. RADHA SAMPSON MD   SYSTEM ID:  [id number]    Discussed with Dr. Tushar Daniel PA-C   [facility]  [phone]

## 2023-08-16 NOTE — ED PROVIDER NOTES
"  History     Chief Complaint:  Nausea & Vomiting       The history is provided by the patient.      Katharina Mejias is a 63 year old female who presents with nausea and vomiting. She was seen here in the ED yesterday for similar symptoms. She reports she began to feel sick on Friday after eating out at [employer], with vomiting and several bowel movements. The next morning, she felt fatigued and \"sluggish\", but felt better after eating. She notes the salad she ate had been sitting out on the counter for a few hours. On Monday, she woke up and worked, feeling okay. After eating 2 pieces of toast she felt unwell again, and her symptoms started through the evenings. Her symptoms again resolved, until last night after eating toast, she began retching throughout the night. With her episodes of retching that come and go, she endorses acid reflux and lower abdominal pain. Here in the ED, she endorses a little pain and nausea. Denies diarrhea or other symptoms. Patient states she has not travelled or used any antibiotics. Denies history of bowel issues, Crohn's disease, or colonoscopy.    Independent Historian:   None - Patient Only    Review of External Notes:   I reviewed the ED note from yesterday 08/15/2023.    Medications:    Albuterol   Famotidine  Losartan  Rosuvastatin   Semaglutide  Spironolactone    Past Medical History:    Acute and subacute iridocyclitis, unspecified  Anklosing spondylitis   Hypertension   Lumbago   Tobacco use disorder   Cataract   Ovarian cyst   Dyslipidemia   Lymphedema  Anxiety   Essential hypertension    Past Surgical History:    Eye surgery, cataract     Physical Exam   Patient Vitals for the past 24 hrs:   BP Temp Temp src Pulse Resp SpO2 Height Weight   08/16/23 1108 (!) 181/88 98.4  F (36.9  C) Oral 88 18 94 % -- --   08/16/23 0651 (!) 209/105 99  F (37.2  C) Oral 89 20 93 % 1.702 m (5' 7\") (!) 165.6 kg (365 lb)        Physical Exam  General: The patient is alert, in no respiratory " distress.    Cardiovascular: Regular rate and rhythm. Good pulses in all four extremities. Normal capillary refill and skin turgor.     Respiratory: Lungs are clear. No nasal flaring. No retractions. No wheezing, no crackles.    Gastrointestinal: Abdomen soft. No guarding, no rebound. No palpable hernias. Lower abdominal tenderness.    Musculoskeletal: No gross deformity.     Skin: No rashes or petechiae.     Neurologic: The patient is alert and oriented x3. GCS 15. No testable cranial nerve deficit. Follows commands with clear and appropriate speech. Gives appropriate answers. Good strength in all extremities. No gross neurologic deficit. Gross sensation intact. Pupils are round and reactive. No meningismus.     Lymphatic: No cervical adenopathy. No lower extremity swelling.    Psychiatric: The patient is non-tearful.      Emergency Department Course   Imaging:  CT Abdomen Pelvis w Contrast   Final Result   IMPRESSION:    1. Two adjacent 2-3 mm right proximal/mid ureteral calculi resulting   in moderate upstream dilatation.      2. Additional 3 mm left proximal/mid ureteral calculus resulting in   mild to moderate upstream dilatation.      3. Bilateral punctate nonobstructing calculi.      4. Abnormally thickened endometrium (1.1 cm) for postmenopausal   patient with internal heterogeneity and possible small polyp.   Recommend follow-up with nonemergent pelvic ultrasound.      RADHA SAMPSON MD            SYSTEM ID:  [id number]         Report per radiology    Laboratory:  Labs Ordered and Resulted from Time of ED Arrival to Time of ED Departure   COMPREHENSIVE METABOLIC PANEL - Abnormal       Result Value    Sodium 140      Potassium 3.8      Chloride 103      Carbon Dioxide (CO2) 26      Anion Gap 11      Urea Nitrogen 22.7      Creatinine 1.27 (*)     Calcium 11.0 (*)     Glucose 126 (*)     Alkaline Phosphatase 103      AST 35      ALT 34      Protein Total 7.3      Albumin 4.4      Bilirubin Total 0.7       GFR Estimate 47 (*)    CBC WITH PLATELETS - Abnormal    WBC Count 11.8 (*)     RBC Count 4.55      Hemoglobin 13.9      Hematocrit 41.5      MCV 91      MCH 30.5      MCHC 33.5      RDW 12.7      Platelet Count 283     ROUTINE UA WITH MICROSCOPIC - Abnormal    Color Urine Yellow      Appearance Urine Clear      Glucose Urine Negative      Bilirubin Urine Negative      Ketones Urine 20 (*)     Specific Gravity Urine 1.018      Blood Urine Small (*)     pH Urine 6.0      Protein Albumin Urine 10 (*)     Urobilinogen Urine Normal      Nitrite Urine Negative      Leukocyte Esterase Urine Negative      Bacteria Urine Few (*)     Mucus Urine Present (*)     RBC Urine 74 (*)     WBC Urine 8 (*)     Squamous Epithelials Urine 2 (*)    INFLUENZA A/B, RSV, & SARS-COV2 PCR - Normal    Influenza A PCR Negative      Influenza B PCR Negative      RSV PCR Negative      SARS CoV2 PCR Negative        Emergency Department Course & Assessments:  Interventions:  Medications   dextrose 50 % injection 50 mL (has no administration in time range)   glucose gel 15-30 g (has no administration in time range)     Or   dextrose 50 % injection 25-50 mL (has no administration in time range)     Or   glucagon injection 1 mg (has no administration in time range)   tamsulosin (FLOMAX) capsule 0.4 mg (has no administration in time range)   0.9% sodium chloride BOLUS (1,000 mLs Intravenous $New Bag 8/16/23 0804)   ondansetron (ZOFRAN) injection 4 mg (4 mg Intravenous $Given 8/16/23 0808)   diphenhydrAMINE (BENADRYL) injection 12.5 mg (12.5 mg Intravenous $Given 8/16/23 0808)   prochlorperazine (COMPAZINE) injection 5 mg (5 mg Intravenous $Given 8/16/23 0806)   iopamidol (ISOVUE-370) solution 100 mL (100 mLs Intravenous $Given 8/16/23 0833)   sodium chloride (PF) 0.9% PF flush 65 mL (65 mLs Intravenous $Given 8/16/23 0834)      Independent Interpretation (X-rays, CTs, rhythm strip):  Reviewed the CT scan prior to the radiologist interpretation and  did well visualize the stone    Assessments/Consultations/Discussion of Management or Tests:   ED Course as of 08/16/23 1136   Wed Aug 16, 2023   0729 Dr. Hammond's evaluation   0901 Updated.   0943 Consult with ZANE Llanos, Urology.   0946 Updated.   1037 Consult with ZANE Guthrie, who accepted for Dr. Vernon, hospitalist       Social Determinants of Health affecting care:   None    Disposition:  The patient was admitted to the hospital under the care of Dr. Vernon.     Impression & Plan    CMS Diagnoses: None    Medical Decision Making:  The patient had had dry heaves and nausea mainly triggered by eating.  There had been some abdominal pain but I felt that this may be due to vomiting or enteritis.  However with the patient's he did visits to the ER I felt there could be other process such as bowel obstruction or diverticulitis.  Therefore a CT scan was ordered.  The CT scan did demonstrate some ureteral stones on both sides and I called and discussed the case with urology.  They did feel that he required stents and the patient was admitted to hospital in good condition.    Critical Care time:  was 45 minutes for this patient excluding procedures.    Diagnosis:    ICD-10-CM    1. Ureteral stone  N20.1 Case Request: CYSTOSCOPY, WITH URETERAL STENT INSERTION     Case Request: CYSTOSCOPY, WITH URETERAL STENT INSERTION      2. Nausea and vomiting, unspecified vomiting type  R11.2       3. Bilateral kidney stones  N20.0          Scribe Disclosure:  Ariana KRAUS, am serving as a scribe at 7:26 AM on 8/16/2023 to document services personally performed by Jairon Hammond MD based on my observations and the provider's statements to me.    8/16/2023   Jairon Hammond MD Farnan, Christopher M, MD  08/16/23 9795

## 2023-08-16 NOTE — ANESTHESIA PROCEDURE NOTES
Airway       Patient location during procedure: OR       Procedure Start/Stop Times: 8/16/2023 5:03 PM  Staff -        Anesthesiologist:  Gregory Castillo MD       CRNA: Mihai Gonzalez APRN CRNA       Performed By: CRNA  Consent for Airway        Urgency: elective  Indications and Patient Condition       Indications for airway management: de-procedural       Induction type:intravenous       Mask difficulty assessment: 0 - not attempted    Final Airway Details       Final airway type: endotracheal airway       Successful airway: ETT - single  Endotracheal Airway Details        ETT size (mm): 7.0       Cuffed: yes       Successful intubation technique: direct laryngoscopy       DL Blade Type: MAC 4       Grade View of Cords: 3       Adjucts: stylet       Position: Right       Measured from: lips       Secured at (cm): 22       Bite block used: Oral Airway    Post intubation assessment        Placement verified by: capnometry, equal breath sounds and chest rise        Number of attempts at approach: 1       Number of other approaches attempted: 0       Secured with: plastic tape       Ease of procedure: easy       Dentition: Intact and Unchanged    Medication(s) Administered   Medication Administration Time: 8/16/2023 5:03 PM

## 2023-08-16 NOTE — ED NOTES
Report given to Rosalinda IRAHETA boarder nurse. Pt will be moved to [facility] for boarding until a room becomes available or she goes to the ED whichever comes first.

## 2023-08-16 NOTE — ANESTHESIA PREPROCEDURE EVALUATION
Anesthesia Pre-Procedure Evaluation    Patient: Katharina Mejias   MRN: [id number] : 1960        Procedure : Procedure(s):  CYSTOSCOPY, WITH URETERAL STENT INSERTION          Past Medical History:   Diagnosis Date    Acute and subacute iridocyclitis, unspecified ,     Ankylosing spondylitis (H)     Hypertension     Lumbago     Other and unspecified ovarian cyst 1985    Other cataract     Tobacco use disorder       Past Surgical History:   Procedure Laterality Date    EYE SURGERY      cataract    NO HISTORY OF SURGERY        Allergies   Allergen Reactions    Pcn [Penicillins] Hives    Chantix [Varenicline]      Suicidal thoughts    Mold      Vomiting and diarrhea       Social History     Tobacco Use    Smoking status: Every Day     Packs/day: 0.50     Years: 40.00     Pack years: 20.00     Types: Cigarettes     Start date: 1971    Smokeless tobacco: Never   Substance Use Topics    Alcohol use: Yes     Comment: rarely      Wt Readings from Last 1 Encounters:   23 (!) 164.7 kg (363 lb)        Anesthesia Evaluation            ROS/MED HX  ENT/Pulmonary:     (+)     CORRINA risk factors,  hypertension, obese,        tobacco use, Current use,                      Neurologic:  - neg neurologic ROS     Cardiovascular:     (+)  hypertension- -   -  - -                                      METS/Exercise Tolerance:     Hematologic:  - neg hematologic  ROS     Musculoskeletal:   (+)  arthritis,             GI/Hepatic:     (+) GERD, Symptomatic,                  Renal/Genitourinary:     (+) renal disease,      Nephrolithiasis ,       Endo:     (+)  type II DM,   Not using insulin,          Obesity,       Psychiatric/Substance Use:  - neg psychiatric ROS     Infectious Disease:  - neg infectious disease ROS     Malignancy:       Other:            Physical Exam    Airway        Mallampati: II   TM distance: > 3 FB   Neck ROM: full   Mouth opening: > 3 cm    Respiratory Devices and Support         Dental        (+) Minor Abnormalities - some fillings, tiny chips      Cardiovascular          Rhythm and rate: regular and normal     Pulmonary           breath sounds clear to auscultation           OUTSIDE LABS:  CBC:   Lab Results   Component Value Date    WBC 11.8 (H) 08/16/2023    WBC 11.8 (H) 08/15/2023    HGB 13.9 08/16/2023    HGB 12.8 08/15/2023    HCT 41.5 08/16/2023    HCT 37.8 08/15/2023     08/16/2023     08/15/2023     BMP:   Lab Results   Component Value Date     08/16/2023     08/15/2023    POTASSIUM 3.8 08/16/2023    POTASSIUM 3.8 08/15/2023    CHLORIDE 103 08/16/2023    CHLORIDE 102 08/15/2023    CO2 26 08/16/2023    CO2 26 08/15/2023    BUN 22.7 08/16/2023    BUN 22.5 08/15/2023    CR 1.27 (H) 08/16/2023    CR 1.26 (H) 08/15/2023     (H) 08/16/2023     (H) 08/15/2023     COAGS:   Lab Results   Component Value Date    PTT 26 07/30/2007    INR 1.01 07/30/2007     POC: No results found for: BGM, HCG, HCGS  HEPATIC:   Lab Results   Component Value Date    ALBUMIN 4.4 08/16/2023    PROTTOTAL 7.3 08/16/2023    ALT 34 08/16/2023    AST 35 08/16/2023    ALKPHOS 103 08/16/2023    BILITOTAL 0.7 08/16/2023     OTHER:   Lab Results   Component Value Date    A1C 5.7 (H) 12/09/2022    NICOLA 11.0 (H) 08/16/2023    TSH 1.77 03/05/2021    CRP 8.7 (H) 03/20/2018    SED 24 03/20/2018       Anesthesia Plan    ASA Status:  3    NPO Status:  NPO Appropriate    Anesthesia Type: General.     - Airway: ETT   Induction: Intravenous.   Maintenance: Balanced.        Consents    Anesthesia Plan(s) and associated risks, benefits, and realistic alternatives discussed. Questions answered and patient/representative(s) expressed understanding.     - Discussed:     - Discussed with:  Patient      - Extended Intubation/Ventilatory Support Discussed: No.      - Patient is DNR/DNI Status: No     Use of blood products discussed: No .     Postoperative Care    Pain management: IV analgesics, Oral pain  medications, Multi-modal analgesia.   PONV prophylaxis: Ondansetron (or other 5HT-3), Dexamethasone or Solumedrol     Comments:                Gregory Castillo MD   Doxepin Pregnancy And Lactation Text: This medication is Pregnancy Category C and it isn't known if it is safe during pregnancy. It is also excreted in breast milk and breast feeding isn't recommended.

## 2023-08-16 NOTE — ANESTHESIA CARE TRANSFER NOTE
Patient: Katharina Mejias    Procedure: Procedure(s):  CYSTOSCOPY, WITH URETERAL STENT INSERTION       Diagnosis: Ureteral stone [N20.1]  Diagnosis Additional Information: No value filed.    Anesthesia Type:   General     Note:    Oropharynx: spontaneously breathing and oropharynx clear of all foreign objects  Level of Consciousness: awake  Oxygen Supplementation: face mask  Level of Supplemental Oxygen (L/min / FiO2): 6l  Independent Airway: airway patency satisfactory and stable  Dentition: dentition unchanged  Vital Signs Stable: post-procedure vital signs reviewed and stable  Report to RN Given: handoff report given  Patient transferred to: PACU  Comments: Pt to PACU, VSS, report to RN  Handoff Report: Identifed the Patient, Identified the Reponsible Provider, Reviewed the pertinent medical history, Discussed the surgical course, Reviewed Intra-OP anesthesia mangement and issues during anesthesia, Set expectations for post-procedure period and Allowed opportunity for questions and acknowledgement of understanding      Vitals:  Vitals Value Taken Time   /64 08/16/23 1736   Temp     Pulse 80 08/16/23 1738   Resp 18 08/16/23 1738   SpO2 100 % 08/16/23 1738   Vitals shown include unvalidated device data.    Electronically Signed By: TRISH Urbano CRNA  August 16, 2023  5:40 PM

## 2023-08-16 NOTE — PHARMACY-ADMISSION MEDICATION HISTORY
Pharmacist Admission Medication History    Admission medication history is complete. The information provided in this note is only as accurate as the sources available at the time of the update.    Medication reconciliation/reorder completed by provider prior to medication history? No    Information Source(s): Patient via in-person    Pertinent Information: None    Changes made to PTA medication list:  Added: Benadryl  Deleted: herbal supplement (banaba + berberine), Wegovy  Changed: None       Allergies reviewed with patient and updates made in EHR: yes    Medication History Completed By: Imani Jackson [facility] [place] 8/16/2023 10:59 AM    Prior to Admission medications    Medication Sig Last Dose Taking? Auth Provider Long Term End Date   albuterol (PROAIR HFA/PROVENTIL HFA/VENTOLIN HFA) 108 (90 Base) MCG/ACT inhaler Inhale 2 puffs into the lungs every 4 hours as needed for shortness of breath / dyspnea or wheezing prn at prn Yes Ayla Graf MD Yes    diphenhydrAMINE (BENADRYL) 25 MG tablet Take 50 mg by mouth every 6 hours as needed for itching or allergies prn at prn Yes Unknown, Entered By History     famotidine (PEPCID) 20 MG tablet Take 1 tablet (20 mg) by mouth 2 times daily 8/16/2023 at am Yes Ayla Graf MD     ibuprofen (ADVIL/MOTRIN) 200 MG tablet Take 400 mg by mouth every 12 hours as needed for mild pain prn at prn Yes Reported, Patient     losartan (COZAAR) 100 MG tablet Take 1 tablet (100 mg) by mouth daily 8/16/2023 at am Yes Ayla Graf MD Yes    multivitamin w/minerals (THERA-VIT-M) tablet Take 1 tablet by mouth daily 8/15/2023 at pm Yes Alberto Cotton MD     ondansetron (ZOFRAN ODT) 4 MG ODT tab Take 1 tablet (4 mg) by mouth every 8 hours as needed for nausea or vomiting prn at prn Yes Oscar Gil MD  8/18/23   rosuvastatin (CRESTOR) 5 MG tablet Take 1 tablet (5 mg) by mouth daily 8/16/2023 at am Yes Ayla Graf MD Yes    spironolactone (ALDACTONE) 50 MG  tablet Take 1 tablet (50 mg) by mouth daily 8/16/2023 at am Yes Ayla Graf MD Yes    order for DME Equipment being ordered: Jobst or similar support stockings, low pressure 15-20 mmHg or per patient preference. Knee high.  Patient not taking: Reported on 11/22/2022   Ayla Graf MD

## 2023-08-16 NOTE — PLAN OF CARE
ROOM # 204-1    Living Situation (if not independent, order SW consult): Home alone    : Debbie Mejias (daughter) [phone]    Activity level at baseline: independent  Activity level on admit: Independent     Who will be transporting you at discharge: Debbie (daughter)    Patient registered to observation; given Patient Bill of Rights; given the opportunity to ask questions about observation status and their plan of care.  Patient has been oriented to the observation room, bathroom and call light is in place.    Discussed discharge goals and expectations with patient/family.       OBSERVATION patient IN TIME: 1140

## 2023-08-16 NOTE — OP NOTE
SURGEON:  Brenton Finley MD     PREOPERATIVE DIAGNOSIS:  Bilateral ureteral stones     POSTOPERATIVE DIAGNOSIS:  Bilateral ureteral stones     PROCEDURE PERFORMED:  Cystoscopy, bilateral retrograde pyelograms, interpretation of fluoroscopic images. Bilateral ureteral stent placements     ANESTHESIA:  General.     COMPLICATIONS:  None.    INDICATIONS FOR PROCEDURE: This is a 63-year-old woman with obstructing bilateral ureteral stones who now presents for bilateral stent placements    DESCRIPTION OF PROCEDURE: The risks and benefits of the procedure were explained in detail to the patient and informed consent was obtained.  The patient was brought to the operating room and placed supine on the operating room table and underwent general endotracheal anesthetic.  The patient was moved down to the dorsal lithotomy position and was prepped and draped in the standard sterile fashion.  I inserted the 22 [place] rigid cystoscope through the urethra into the bladder and I performed cystoscopy.  There were no urothelial abnormalities identified.  I identified the right ureteral orifice and cannulated the orifice with a ureteral catheter.  I performed a retrograde pyelogram.  There was retained contrast in the right kidney from the previous CT scan.  There is mild hydronephrosis.  I passed a Glidewire into the kidney under fluoroscopic guidance and then I backloaded off the ureteral catheter.  I then placed a 6 x 24 double-J ureteral stent over the Glidewire.  I pulled back the wire and a good curl was seen in the renal pelvis under fluoroscopy.  A good curl was seen in the bladder under direct visualization.  I next identified the left ureteral orifice and cannulated the orifice with a ureteral catheter.  I performed a retrograde pyelogram.  There was also retained contrast in the left kidney and on retrograde pyelogram there was mild left hydronephrosis.  I passed a Glidewire into the kidney under fluoroscopic guidance and  then I backloaded off the ureteral catheter.  I then placed a 6 x 26 double-J ureteral stent over the Glidewire.  I pulled back the wire and a good curl was seen in the renal pelvis under fluoroscopy.  A good curl was seen in the bladder under direct visualization.  I drained the bladder with a Maya catheter.  The patient tolerated the procedure well without complication.  The patient went to the postanesthetic care unit in good condition.  She will be readmitted to the hospital overnight.  She can have her Maya catheter removed tomorrow morning.  She will follow-up for outpatient ureteroscopy to remove her stones in the future.

## 2023-08-16 NOTE — ED NOTES
"[facility]  ED Nurse Handoff Report    ED Chief complaint: Nausea & Vomiting  . ED Diagnosis:   Final diagnoses:   Nausea and vomiting, unspecified vomiting type   Bilateral kidney stones       Allergies:   Allergies   Allergen Reactions    Chantix [Varenicline]      Suicidal thoughts    Mold      Vomiting and diarrhea     Pcn [Penicillins]        Code Status: Full Code    Activity level - Baseline/Home:  independent.  Activity Level - Current:   independent.   Lift room needed: No.   Bariatric: No   Needed: No   Isolation: No.   Infection: Not Applicable.     Respiratory status: Room air    Vital Signs (within 30 minutes):   Vitals:    08/16/23 0651   BP: (!) 209/105   Pulse: 89   Resp: 20   Temp: 99  F (37.2  C)   TempSrc: Oral   SpO2: 93%   Weight: (!) 165.6 kg (365 lb)   Height: 1.702 m (5' 7\")       Cardiac Rhythm:  ,      Pain level:    Patient confused: No.   Patient Falls Risk: nonskid shoes/slippers when out of bed.   Elimination Status: Has voided     Patient Report - Initial Complaint: pt presents c/o nausea and vomiting and abdominal pain.   Focused Assessment: pt c/o nausea, vomiting, pt is ambulatory, c/o shaking chills. Daughter is at bedside.     Abnormal Results:   Labs Ordered and Resulted from Time of ED Arrival to Time of ED Departure   COMPREHENSIVE METABOLIC PANEL - Abnormal       Result Value    Sodium 140      Potassium 3.8      Chloride 103      Carbon Dioxide (CO2) 26      Anion Gap 11      Urea Nitrogen 22.7      Creatinine 1.27 (*)     Calcium 11.0 (*)     Glucose 126 (*)     Alkaline Phosphatase 103      AST 35      ALT 34      Protein Total 7.3      Albumin 4.4      Bilirubin Total 0.7      GFR Estimate 47 (*)    CBC WITH PLATELETS - Abnormal    WBC Count 11.8 (*)     RBC Count 4.55      Hemoglobin 13.9      Hematocrit 41.5      MCV 91      MCH 30.5      MCHC 33.5      RDW 12.7      Platelet Count 283     ROUTINE UA WITH MICROSCOPIC - Abnormal    Color " Urine Yellow      Appearance Urine Clear      Glucose Urine Negative      Bilirubin Urine Negative      Ketones Urine 20 (*)     Specific Gravity Urine 1.018      Blood Urine Small (*)     pH Urine 6.0      Protein Albumin Urine 10 (*)     Urobilinogen Urine Normal      Nitrite Urine Negative      Leukocyte Esterase Urine Negative      Bacteria Urine Few (*)     Mucus Urine Present (*)     RBC Urine 74 (*)     WBC Urine 8 (*)     Squamous Epithelials Urine 2 (*)    INFLUENZA A/B, RSV, & SARS-COV2 PCR - Normal    Influenza A PCR Negative      Influenza B PCR Negative      RSV PCR Negative      SARS CoV2 PCR Negative          CT Abdomen Pelvis w Contrast   Final Result   IMPRESSION:    1. Two adjacent 2-3 mm right proximal/mid ureteral calculi resulting   in moderate upstream dilatation.      2. Additional 3 mm left proximal/mid ureteral calculus resulting in   mild to moderate upstream dilatation.      3. Bilateral punctate nonobstructing calculi.      4. Abnormally thickened endometrium (1.1 cm) for postmenopausal   patient with internal heterogeneity and possible small polyp.   Recommend follow-up with nonemergent pelvic ultrasound.      RADHA SAMPSON MD            SYSTEM ID:  [id number]          Treatments provided: labs, imaging, IV fluids, meds  Family Comments: none  OBS brochure/video discussed/provided to patient:  Yes  ED Medications:   Medications   dextrose 50 % injection 50 mL (has no administration in time range)   glucose gel 15-30 g (has no administration in time range)     Or   dextrose 50 % injection 25-50 mL (has no administration in time range)     Or   glucagon injection 1 mg (has no administration in time range)   0.9% sodium chloride BOLUS (1,000 mLs Intravenous $New Bag 8/16/23 0804)   ondansetron (ZOFRAN) injection 4 mg (4 mg Intravenous $Given 8/16/23 0808)   diphenhydrAMINE (BENADRYL) injection 12.5 mg (12.5 mg Intravenous $Given 8/16/23 0808)   prochlorperazine (COMPAZINE) injection  5 mg (5 mg Intravenous $Given 8/16/23 0806)   iopamidol (ISOVUE-370) solution 100 mL (100 mLs Intravenous $Given 8/16/23 0833)   sodium chloride (PF) 0.9% PF flush 65 mL (65 mLs Intravenous $Given 8/16/23 0834)       Drips infusing:  No  For the majority of the shift this patient was Green.   Interventions performed were none.    Sepsis treatment initiated: No    Cares/treatment/interventions/medications to be completed following ED care: meds as needed, pt NPO pending stent placement    ED Nurse Name: Candelaria Lorenzana RN  10:36 AM     RECEIVING UNIT ED HANDOFF REVIEW    Above ED Nurse Handoff Report was reviewed: Yes  Reviewed by: Jefe Gustafson RN on August 16, 2023 at 11:12 AM

## 2023-08-16 NOTE — ANESTHESIA POSTPROCEDURE EVALUATION
Patient: Katharina Mejias    Procedure: Procedure(s):  CYSTOSCOPY, WITH URETERAL STENT INSERTION       Anesthesia Type:  General    Note:  Disposition: Inpatient   Postop Pain Control: Uneventful            Sign Out: Well controlled pain   PONV: No   Neuro/Psych: Uneventful            Sign Out: Acceptable/Baseline neuro status   Airway/Respiratory: Uneventful            Sign Out: Acceptable/Baseline resp. status   CV/Hemodynamics: Uneventful            Sign Out: Acceptable CV status   Other NRE: NONE   DID A NON-ROUTINE EVENT OCCUR? No           Last vitals:  Vitals Value Taken Time   /65 08/16/23 1801   Temp 97.2  F (36.2  C) 08/16/23 1758   Pulse 90 08/16/23 1809   Resp 15 08/16/23 1808   SpO2 95 % 08/16/23 1808   Vitals shown include unvalidated device data.    Electronically Signed By: Gregory Castillo MD  August 16, 2023  6:24 PM

## 2023-08-17 NOTE — PLAN OF CARE
PRIMARY DIAGNOSIS: s/p cystoscopy w/ bilateral stent placement   OUTPATIENT/OBSERVATION GOALS TO BE MET BEFORE DISCHARGE:  1. Stable vital signs Yes  2. Tolerating diet:Yes  3. Pain controlled with oral pain medications:  Yes  4. Positive bowel sounds:  Yes  5. Voiding without difficulty:   Maya in place   6. Able to ambulate:  Yes  7. Provider specific discharge goals met:  No     Discharge Planner Nurse   Safe discharge environment identified: Yes  Barriers to discharge: Yes     Patient is Alert and Oriented x4. They are SBA with no assistive devices. Pt is tolerating a regular diet. Maya pulled this am, voided twice so far, 50 mls each time. Will bladder scan after next void.

## 2023-08-17 NOTE — DISCHARGE SUMMARY
"[facility]    Discharge Summary  Hospitalist    Date of Admission:  8/16/2023  Date of Discharge:  8/17/2023  Provider:  Marylu Valle PA-C  Date of Service (when I last saw the patient): 08/17/23    Discharge Diagnoses   Bilateral ureteral stones  S/p cystoscopy, bilateral retrograde pyelograms, and ureteral stent placement  MAVIS  HTN    Other medical issues:  Past Medical History:   Diagnosis Date    Acute and subacute iridocyclitis, unspecified 8/05, 4/06    Ankylosing spondylitis (H)     Hypertension     Lumbago     Other and unspecified ovarian cyst 1985    Other cataract     Tobacco use disorder      History of Present Illness   Katharina Mejias is a 63 year old female with PMH significant for HTN, current tobacco abuse, chronic shortness of breath, chronic back pain, GERD, and hyperlipidemia who presents to the ED for the second time in two days for ongoing nausea and dry heaving. Patient reports onset Friday evening.  She reports feeling \"queasy\" the last couple of days and generally hurt all over in her abdomen without focal area of pain.  She reports chronic back pain that is not worse than her baseline.  Notes low-grade fever of 99.5 with associated chills. She has baseline shortness of breath but denies recent chest pain.  Denies dysuria, urinary urgency or frequency, or hematuria.  Patient has had previous kidney stones that she has been able to pass on her own and has not had any previous interventions.  She states her last episode of a kidney stone was a few years ago and she had more severe pain in her abdomen than currently.  Patient intermittently falls asleep during interview due to being sleepy.  Initially seen in the ED on 8/15 for symptoms felt to be related to gastroenteritis but due to ongoing symptoms return to the ED today. Please see the admission history and physical for full details.    Hospital Course   Katharina Mejias was admitted on 8/16/2023.  The following " problems were addressed during her hospitalization:    #Bilateral ureteral stones  #MAVIS: onset the evening of 8/11 of nausea with dry heaving but no emesis, lower abdominal pain the last few days prior to admission but no increased back pain, and feeling generally unwell. Reports low grade fever of 99.5 with chills at home. No dysuria, hematuria, urinary urgency or frequency. H/o kidney stones with last episode a few years ago and none have previously required intervention.  Febrile, mild leukocytosis of 11.8, UA shows hematuria but otherwise not consistent with UTI.  CT of abdomen/pelvis shows 2 adjacent 2 to 3 mm right proximal/mid ureteral calculi resulting in moderate upstream dilatation, additional 3 mm left proximal/mid ureteral calculus resulting in mild to moderate upstream dilatation. Creatinine elevated at 1.27, baseline around 0.88.   -Urology consult, s/p cystoscopy, bilateral retrograde pyelograms, and bilateral ureteral stent placement   -daily flomax  -ovalle initially placed postoperatively, removed on day of discharge and patient able to void  -creatinine improving to 1.12 from 1.27 on discharge, will need to recheck BMP in about a week with PCP   -pain control with Oxybutynin PRN and low dose Oxycodone at discharge   -follow up with Urology for definitive stone management      #Hypertension: BP elevated, suspect partially related to pain  -continue to hold Spironolactone upon discharge due to MAVIS  -continue PTA Losartan with close monitoring of kidney function      #HLD: resume PTA Rosuvastatin upon discharge     #GERD: resume Pepcid BID     #Chronic shortness of breath: no formal diagnosis of asthma or COPD  -PRN albuterol neb for shortness of breath    #Suspected sleep apnea   -discussed need for outpatient sleep study due to reports of daytime somnolence and snoring      #Tobacco abuse: smokes about 1/2 ppd  -nicotine patch available      #Morbid obesity: noted, complicates care    Pending  "Results   None    Code Status   Full Code       Primary Care Physician   Ayla Graf    Exam:    BP (!) 155/69 (BP Location: Left arm)   Pulse 91   Temp 98.4  F (36.9  C) (Oral)   Resp 18   Ht 1.702 m (5' 7\")   Wt (!) 164.7 kg (363 lb)   LMP 07/09/2007 (LMP Unknown)   SpO2 92%   BMI 56.85 kg/m    GEN:  Alert, oriented x 3, appears comfortable, no overt distress  HEENT:  Normocephalic/atraumatic, no scleral icterus, no nasal discharge, mouth moist.  CV:  Regular rate and rhythm, no murmur or JVD.  S1 + S2 noted, no S3 or S4.  LUNGS:  Clear to auscultation bilaterally without rales/rhonchi/wheezing/retractions.  Symmetric chest rise on inhalation noted.  ABD:  Active bowel sounds, soft, morbidly obese, minimal lower abdominal tenderness with palpation, non-distended.  No rebound/guarding/rigidity.  EXT:  No LE edema.  No cyanosis.  No acute joint synovitis noted.  SKIN:  Dry to touch, no exanthems noted in the visualized areas.  NEURO:  Symmetric muscle strength, sensation to touch grossly intact.  Coordination symmetric on general exam.  No new focal deficits appreciated.    Discharge Disposition   Discharged to home    Consultations This Hospital Stay   UROLOGY IP CONSULT    Time Spent on this Encounter   ILala PA-C, personally saw the patient today and spent greater than 30 minutes discharging this patient.    Discharge Orders      Reason for your hospital stay    You were admitted due to concerns for nausea, dry heaves, and mild abdominal pain related to bilateral kidney stones based on CT scan of abdomen/pelvis. You were seen by urology and underwent bilateral stent placement on 8/16.  Your initial labs showed slightly elevated kidney function which was improving but not at your baseline prior to discharge.  After urology procedure you had a Maya catheter placed which was removed prior to discharge and you were able to urinate without difficulty.  You should continue Flomax daily " for stent discomfort as well as oxybutynin can be trialed for pelvic pressure with urination.  Possible side effects of an indwelling ureteral stent include urinary urgency and frequency, burning with urination, and weakness in the side.  You will need outpatient follow-up with urology for definitive stone management in a couple weeks, you should be contacted to set this up.  It is recommended you discuss with your primary care provider about outpatient sleep study to assess for sleep apnea. Based on your kidney function it is recommended your hold your Spironolactone for now until your kidney function is rechecked with your primary care provider.     Follow-up and recommended labs and tests     Follow up with primary care provider within 7 days for hospital follow-up.  The following labs/tests are recommended: BMP.     Activity    Your activity upon discharge: activity as tolerated     Discharge Instructions    1. Resume Losartan but continue to hold Spironolactone until recheck of kidney function in 5-7 days     Diet    Follow this diet upon discharge: Orders Placed This Encounter      Regular Diet Adult     Discharge Medications   Discharge Medication List as of 8/17/2023  3:46 PM        START taking these medications    Details   oxyBUTYnin (DITROPAN) 5 MG tablet Take 1 tablet (5 mg) by mouth 3 times daily as needed for bladder spasms, Disp-20 tablet, R-0, E-Prescribe      oxyCODONE (ROXICODONE) 5 MG tablet Take 1 tablet (5 mg) by mouth every 6 hours as needed for pain, Disp-8 tablet, R-0, Local Print      tamsulosin (FLOMAX) 0.4 MG capsule Take 1 capsule (0.4 mg) by mouth daily, Disp-30 capsule, R-0, E-Prescribe           CONTINUE these medications which have NOT CHANGED    Details   albuterol (PROAIR HFA/PROVENTIL HFA/VENTOLIN HFA) 108 (90 Base) MCG/ACT inhaler Inhale 2 puffs into the lungs every 4 hours as needed for shortness of breath / dyspnea or wheezing, Disp-18 g, R-1, E-PrescribePharmacy may dispense  brand covered by insurance (Proair, or proventil or ventolin or generic albuterol inhaler)      diphenhydrAMINE (BENADRYL) 25 MG tablet Take 50 mg by mouth every 6 hours as needed for itching or allergies, Historical      famotidine (PEPCID) 20 MG tablet Take 1 tablet (20 mg) by mouth 2 times daily, Disp-60 tablet, R-3, E-Prescribe      losartan (COZAAR) 100 MG tablet Take 1 tablet (100 mg) by mouth daily, Disp-90 tablet, R-4, E-Prescribe      multivitamin w/minerals (THERA-VIT-M) tablet Take 1 tablet by mouth daily, Disp-100 tablet, R-3, Historical      ondansetron (ZOFRAN ODT) 4 MG ODT tab Take 1 tablet (4 mg) by mouth every 8 hours as needed for nausea or vomiting, Disp-10 tablet, R-0, E-Prescribe      rosuvastatin (CRESTOR) 5 MG tablet Take 1 tablet (5 mg) by mouth daily, Disp-90 tablet, R-4, E-Prescribe      spironolactone (ALDACTONE) 50 MG tablet Take 1 tablet (50 mg) by mouth daily, Disp-90 tablet, R-4, E-Prescribe      order for DME Equipment being ordered: Jobst or similar support stockings, low pressure 15-20 mmHg or per patient preference. Knee high.Disp-4 each, R-11, Local Print           STOP taking these medications       ibuprofen (ADVIL/MOTRIN) 200 MG tablet Comments:   Reason for Stopping:             Allergies   Allergies   Allergen Reactions    Pcn [Penicillins] Hives    Chantix [Varenicline]      Suicidal thoughts    Mold      Vomiting and diarrhea      Data   Results for orders placed or performed during the hospital encounter of 08/16/23   CT Abdomen Pelvis w Contrast     Status: None    Narrative    CT ABDOMEN AND PELVIS WITH CONTRAST 8/16/2023 8:44 AM    CLINICAL HISTORY: Abdominal pain, vomiting.    TECHNIQUE: CT scan of the abdomen and pelvis was performed following  injection of IV contrast. Multiplanar reformats were obtained. Dose  reduction techniques were used.    CONTRAST: 100mL Isovue-370    COMPARISON: Abdominal radiograph 3/23/2021.    FINDINGS:   LOWER CHEST: No infiltrates or  effusions.    HEPATOBILIARY: No significant mass or bile duct dilatation. No  calcified gallstones.     PANCREAS: No significant mass, duct dilatation, or inflammatory  change.    SPLEEN: Normal size.    ADRENAL GLANDS: No significant nodules.    KIDNEYS/BLADDER: There are two adjacent stones (2-3 mm) in the right  proximal/mid ureter with upstream moderate pelvocaliectasis.  Additional 3 mm calculus in the left distal proximal/mid with mild  upstream dilatation. Nonobstructing bilateral upper pole calculi.    BOWEL: Diverticulosis in the colon. No acute inflammatory change. No  obstruction.     PELVIC ORGANS: Uterus is present. The endometrium measures 1.2 cm with  lower uterine segment hyperdense nodule measuring 0.6 cm (3/193). No  adnexal mass.    ADDITIONAL FINDINGS: No ascites. A few prominent right lower quadrant  mesenteric lymph nodes, likely reactive. Nonaneurysmal abdominal  aorta.    MUSCULOSKELETAL: Degenerative changes of the lumbar spine and pubic  symphysis. Grade 1 anterolisthesis of L4 on L5. No aggressive osseous  lesion.      Impression    IMPRESSION:   1. Two adjacent 2-3 mm right proximal/mid ureteral calculi resulting  in moderate upstream dilatation.    2. Additional 3 mm left proximal/mid ureteral calculus resulting in  mild to moderate upstream dilatation.    3. Bilateral punctate nonobstructing calculi.    4. Abnormally thickened endometrium (1.1 cm) for postmenopausal  patient with internal heterogeneity and possible small polyp.  Recommend follow-up with nonemergent pelvic ultrasound.    RADHA SAMPSON MD         SYSTEM ID:  [id number]   XR Surgery ANU L/T 5 Min Fluoro w Stills     Status: None    Narrative    This exam was marked as non-reportable because it will not be read by a   radiologist or a [place] non-radiologist provider.         [place] Draw     Status: None    Narrative    The following orders were created for panel order [place] Draw.  Procedure                                Abnormality         Status                     ---------                               -----------         ------                     Extra Blue Top Tube[[id number]]                              Final result               Extra Red Top Tube[[id number]]                               Final result                 Please view results for these tests on the individual orders.   Symptomatic Influenza A/B, RSV, & SARS-CoV2 PCR (COVID-19) Nasopharyngeal     Status: Normal    Specimen: Nasopharyngeal; Swab   Result Value Ref Range    Influenza A PCR Negative Negative    Influenza B PCR Negative Negative    RSV PCR Negative Negative    SARS CoV2 PCR Negative Negative    Narrative    Testing was performed using the Xpert Xpress CoV2/Flu/RSV Assay on the OneTouchEMRpert Instrument. This test should be ordered for the detection of SARS-CoV-2, influenza, and RSV viruses in individuals who meet clinical and/or epidemiological criteria. Test performance is unknown in asymptomatic patients. This test is for in vitro diagnostic use under the FDA EUA for laboratories certified under CLIA to perform high or moderate complexity testing. This test has not been FDA cleared or approved. A negative result does not rule out the presence of PCR inhibitors in the specimen or target RNA in concentration below the limit of detection for the assay. If only one viral target is positive but coinfection with multiple targets is suspected, the sample should be re-tested with another FDA cleared, approved, or authorized test, if coinfection would change clinical management. This test was validated by the [facility]. These laboratories are certified under the Clinical Laboratory Improvement Amendments of 1988 (CLIA-88) as qualified to perform high complexity laboratory testing.   Comprehensive metabolic panel     Status: Abnormal   Result Value Ref Range    Sodium 140 136 - 145 mmol/L    Potassium 3.8 3.4 - 5.3 mmol/L     Chloride 103 98 - 107 mmol/L    Carbon Dioxide (CO2) 26 22 - 29 mmol/L    Anion Gap 11 7 - 15 mmol/L    Urea Nitrogen 22.7 8.0 - 23.0 mg/dL    Creatinine 1.27 (H) 0.51 - 0.95 mg/dL    Calcium 11.0 (H) 8.8 - 10.2 mg/dL    Glucose 126 (H) 70 - 99 mg/dL    Alkaline Phosphatase 103 35 - 104 U/L    AST 35 0 - 45 U/L    ALT 34 0 - 50 U/L    Protein Total 7.3 6.4 - 8.3 g/dL    Albumin 4.4 3.5 - 5.2 g/dL    Bilirubin Total 0.7 <=1.2 mg/dL    GFR Estimate 47 (L) >60 mL/min/1.73m2   CBC (platelets, no diff)     Status: Abnormal   Result Value Ref Range    WBC Count 11.8 (H) 4.0 - 11.0 10e3/uL    RBC Count 4.55 3.80 - 5.20 10e6/uL    Hemoglobin 13.9 11.7 - 15.7 g/dL    Hematocrit 41.5 35.0 - 47.0 %    MCV 91 78 - 100 fL    MCH 30.5 26.5 - 33.0 pg    MCHC 33.5 31.5 - 36.5 g/dL    RDW 12.7 10.0 - 15.0 %    Platelet Count 283 150 - 450 10e3/uL   Extra Blue Top Tube     Status: None   Result Value Ref Range    Hold Specimen JIC    Extra Red Top Tube     Status: None   Result Value Ref Range    Hold Specimen JI    UA with Microscopic     Status: Abnormal   Result Value Ref Range    Color Urine Yellow Colorless, Straw, Light Yellow, Yellow    Appearance Urine Clear Clear    Glucose Urine Negative Negative mg/dL    Bilirubin Urine Negative Negative    Ketones Urine 20 (A) Negative mg/dL    Specific Gravity Urine 1.018 1.003 - 1.035    Blood Urine Small (A) Negative    pH Urine 6.0 5.0 - 7.0    Protein Albumin Urine 10 (A) Negative mg/dL    Urobilinogen Urine Normal Normal, 2.0 mg/dL    Nitrite Urine Negative Negative    Leukocyte Esterase Urine Negative Negative    Bacteria Urine Few (A) None Seen /HPF    Mucus Urine Present (A) None Seen /LPF    RBC Urine 74 (H) <=2 /HPF    WBC Urine 8 (H) <=5 /HPF    Squamous Epithelials Urine 2 (H) <=1 /HPF   Basic metabolic panel     Status: Abnormal   Result Value Ref Range    Sodium 139 136 - 145 mmol/L    Potassium 3.8 3.4 - 5.3 mmol/L    Chloride 104 98 - 107 mmol/L    Carbon Dioxide (CO2)  28 22 - 29 mmol/L    Anion Gap 7 7 - 15 mmol/L    Urea Nitrogen 19.7 8.0 - 23.0 mg/dL    Creatinine 1.12 (H) 0.51 - 0.95 mg/dL    Calcium 9.4 8.8 - 10.2 mg/dL    Glucose 114 (H) 70 - 99 mg/dL    GFR Estimate 55 (L) >60 mL/min/1.73m2   CBC with platelets and differential     Status: Abnormal   Result Value Ref Range    WBC Count 10.6 4.0 - 11.0 10e3/uL    RBC Count 3.52 (L) 3.80 - 5.20 10e6/uL    Hemoglobin 10.8 (L) 11.7 - 15.7 g/dL    Hematocrit 32.8 (L) 35.0 - 47.0 %    MCV 93 78 - 100 fL    MCH 30.7 26.5 - 33.0 pg    MCHC 32.9 31.5 - 36.5 g/dL    RDW 12.9 10.0 - 15.0 %    Platelet Count 223 150 - 450 10e3/uL    % Neutrophils 77 %    % Lymphocytes 16 %    % Monocytes 7 %    % Eosinophils 0 %    % Basophils 0 %    % Immature Granulocytes 0 %    NRBCs per 100 WBC 0 <1 /100    Absolute Neutrophils 8.0 1.6 - 8.3 10e3/uL    Absolute Lymphocytes 1.7 0.8 - 5.3 10e3/uL    Absolute Monocytes 0.8 0.0 - 1.3 10e3/uL    Absolute Eosinophils 0.0 0.0 - 0.7 10e3/uL    Absolute Basophils 0.0 0.0 - 0.2 10e3/uL    Absolute Immature Granulocytes 0.0 <=0.4 10e3/uL    Absolute NRBCs 0.0 10e3/uL   CBC with platelets differential     Status: Abnormal    Narrative    The following orders were created for panel order CBC with platelets differential.  Procedure                               Abnormality         Status                     ---------                               -----------         ------                     CBC with platelets and d...[[id number]]  Abnormal            Final result                 Please view results for these tests on the individual orders.     Lala Valle PA-C

## 2023-08-17 NOTE — PLAN OF CARE
PRIMARY DIAGNOSIS: s/p cystoscopy w/ bilateral stent placement   OUTPATIENT/OBSERVATION GOALS TO BE MET BEFORE DISCHARGE:  1. Stable vital signs Yes  2. Tolerating diet:Yes  3. Pain controlled with oral pain medications:  Yes  4. Positive bowel sounds:  Yes  5. Voiding without difficulty:   Maya in place   6. Able to ambulate:  Yes  7. Provider specific discharge goals met:  No     Discharge Planner Nurse   Safe discharge environment identified: Yes  Barriers to discharge: Yes     Patient is Alert and Oriented x4. They are SBA with no assistive devices. Pt is tolerating a regular diet. Maya in place draining clear, sami colored urine. Weaned O2.Denies pain at this time.

## 2023-08-17 NOTE — PROGRESS NOTES
[facility]rology Progress Note          Assessment and Plan:     Assessment:     POD 1 Cystoscopy, bilateral retrograde pyelograms, interpretation of fluoroscopic images. Bilateral ureteral stent placements     Bilateral kidney stones    Ureteral stone    Nausea and vomiting, unspecified vomiting type      Plan:   -Discontinue Maya catheter.  Ensure patient can urinate after removal.  -Continue with indwelling ureteral stent.  Will need definitive stone procedure in several weeks.  Our office will coordinate.  This will involve bilateral ureteroscopy laser lithotripsy and basketing, and possible bilateral ureteral stent exchange.  -Would recommend discharging with Flomax 0.4 mg daily for stent discomfort.  -Can also trial oxybutynin 5 mg 3 times daily as needed for pelvic pressure.  -Possible side effects with an indwelling ureteral stent such as urgency and frequency of urination, dysuria, hematuria, symptoms of urine reflux, and some achiness in the side. Indwelling ureteral stents need to be exchanged every three months or removed by three months.    -Okay to discharge from urology perspective.  We will plan on signing off.  Please contact us with any urological concerns.    China Daniel PA-C   [facility]  [phone]               Interval History:     Doing well.  Maya catheter in place draining clear yellow urine.  Patient endorses some pelvic pressure and back discomfort. Denies N/V/F/C/SOB/CP.   Creatinine 1.12 EGFR 55 (1.27 EGFR 47).  Hemoglobin 10.8.  WBC 10.6 (11.8 (11.8)).  Patient is afebrile without tachycardia.              Review of Systems:     The 5 point Review of Systems is negative other than noted in the HPI             Medications:     Current Facility-Administered Medications Ordered in Epic   Medication Dose Route Frequency Last Rate Last Admin    acetaminophen (TYLENOL) tablet 650 mg  650 mg Oral Q6H PRN   650 mg at 08/1959    Or    acetaminophen  (TYLENOL) Suppository 650 mg  650 mg Rectal Q6H PRN        albuterol (PROVENTIL) neb solution 2.5 mg  2.5 mg Nebulization Q2H PRN        ceFAZolin Sodium (ANCEF) injection 3 g  3 g Intravenous Pre-Op/Pre-procedure x 1 dose        ceFAZolin Sodium (ANCEF) injection 3 g  3 g Intravenous See Admin Instructions        famotidine (PEPCID) tablet 20 mg  20 mg Oral BID   20 mg at 08/17/23 0821    hydrALAZINE (APRESOLINE) injection 10 mg  10 mg Intravenous Q4H PRN        HYDROmorphone (DILAUDID) injection 0.2 mg  0.2 mg Intravenous Q2H PRN        hydrOXYzine (ATARAX) tablet 25 mg  25 mg Oral Q6H PRN   25 mg at 08/17/23 0444    Or    hydrOXYzine (ATARAX) tablet 50 mg  50 mg Oral Q6H PRN        iopamidol 61% (ISOVUE 300) 50 mL + sterile water for irrigation 50 mL    PRN   23 ml given at 08/16/23 1720    melatonin tablet 1 mg  1 mg Oral At Bedtime PRN        naloxone (NARCAN) injection 0.2 mg  0.2 mg Intravenous Q2 Min PRN        Or    naloxone (NARCAN) injection 0.4 mg  0.4 mg Intravenous Q2 Min PRN        Or    naloxone (NARCAN) injection 0.2 mg  0.2 mg Intramuscular Q2 Min PRN        Or    naloxone (NARCAN) injection 0.4 mg  0.4 mg Intramuscular Q2 Min PRN        nicotine (NICODERM CQ) 14 MG/24HR 24 hr patch 1 patch  1 patch Transdermal Daily PRN        nicotine Patch in Place   Transdermal Q8H        ondansetron (ZOFRAN ODT) ODT tab 4 mg  4 mg Oral Q6H PRN        Or    ondansetron (ZOFRAN) injection 4 mg  4 mg Intravenous Q6H PRN        oxyCODONE (ROXICODONE) tablet 5 mg  5 mg Oral Q4H PRN   5 mg at 08/17/23 0444    oxyCODONE IR (ROXICODONE) half-tab 2.5 mg  2.5 mg Oral Q4H PRN        prochlorperazine (COMPAZINE) injection 10 mg  10 mg Intravenous Q6H PRN        Or    prochlorperazine (COMPAZINE) tablet 10 mg  10 mg Oral Q6H PRN        Or    prochlorperazine (COMPAZINE) suppository 25 mg  25 mg Rectal Q12H PRN        sterile water irrigation (bag)    PRN   500 mL at 08/16/23 1720    tamsulosin (FLOMAX) capsule 0.4 mg  0.4  mg Oral Daily   0.4 mg at 08/17/23 0821     No current [facility]-ordered outpatient medications on file.                  Physical Exam:   Vitals were reviewed  Patient Vitals for the past 8 hrs:   BP Temp Temp src Pulse Resp SpO2   08/17/23 0823 -- -- -- -- -- 92 %   08/17/23 0748 (!) 156/71 97.8  F (36.6  C) Oral 69 18 94 %   08/17/23 0444 (!) 143/71 97.7  F (36.5  C) Oral 65 16 94 %     GEN: NAD, lying in bed  EYES: EOMI  MOUTH: MMM  NECK: Supple  RESP: Unlabored breathing  NEURO: AAO  URO: Maya catheter in place draining clear yellow urine.           Data:   No results found for: NTBNPI, NTBNP  Lab Results   Component Value Date    WBC 10.6 08/17/2023    WBC 11.8 (H) 08/16/2023    WBC 11.8 (H) 08/15/2023    HGB 10.8 (L) 08/17/2023    HGB 13.9 08/16/2023    HGB 12.8 08/15/2023    HCT 32.8 (L) 08/17/2023    HCT 41.5 08/16/2023    HCT 37.8 08/15/2023    MCV 93 08/17/2023    MCV 91 08/16/2023    MCV 91 08/15/2023     08/17/2023     08/16/2023     08/15/2023     Lab Results   Component Value Date    INR 1.01 07/30/2007

## 2023-08-17 NOTE — CARE PLAN
Patient's After Visit Summary was reviewed with patient.  Patient verbalized understanding of After Visit Summary, recommended follow up and was given an opportunity to ask questions.   Discharge medications sent home with patient/family: YES   Discharged with daughter.

## 2023-08-17 NOTE — PLAN OF CARE
PRIMARY DIAGNOSIS: s/p cystoscopy w/ bilateral stent placement   OUTPATIENT/OBSERVATION GOALS TO BE MET BEFORE DISCHARGE:  1. Stable vital signs Yes  2. Tolerating diet:Yes  3. Pain controlled with oral pain medications:  Yes  4. Positive bowel sounds:  Yes  5. Voiding without difficulty:   Ovalle in place   6. Able to ambulate:  Yes  7. Provider specific discharge goals met:  No    Discharge Planner Nurse   Safe discharge environment identified: Yes  Barriers to discharge: Yes       Entered by: Latrice Wade RN 08/17/2023 4:49 AM     Please review provider order for any additional goals.   Nurse to notify provider when observation goals have been met and patient is ready for discharge.    Vitals are Temp: 97.7  F (36.5  C) Temp src: Oral BP: (!) 143/71 Pulse: 65   Resp: 16 SpO2: 94 %.  Patient is Alert and Oriented x4. They are SBA with no assistive devices .  Pt is a Regular diet.  Tolerating well. They are complaining of 7/10 soreness to perineal area.  Oxycodone and Atarax given for pain.  Patient has Lactated Ringer's running at 125 mL per hour. Ovalle in place draining clear, sami colored urine. Capno on, 1 LPM. Compression devices on. Ambulated in hallway with staff. Reports passing gas. Urology following. Plan is to remove ovalle this morning and discharge home.

## 2023-08-17 NOTE — PLAN OF CARE
PRIMARY DIAGNOSIS: s/p cystoscopy w/ bilateral stent placement   OUTPATIENT/OBSERVATION GOALS TO BE MET BEFORE DISCHARGE:  1. Stable vital signs Yes  2. Tolerating diet:Yes  3. Pain controlled with oral pain medications:  Yes  4. Positive bowel sounds:  Yes  5. Voiding without difficulty:   Ovalle in place   6. Able to ambulate:  Yes  7. Provider specific discharge goals met:  No    Discharge Planner Nurse   Safe discharge environment identified: Yes  Barriers to discharge: Yes       Entered by: Latrice Wade RN 08/17/2023 2:11 AM     Please review provider order for any additional goals.   Nurse to notify provider when observation goals have been met and patient is ready for discharge.    Vitals are Temp: 98.5  F (36.9  C) Temp src: Oral BP: (!) 150/67 Pulse: 79   Resp: 16 SpO2: 92 %.  Patient is Alert and Oriented x4. They are SBA with no assistive devices .  Pt is a Regular diet.  Tolerating well. They are complaining of 7/10 soreness to perineal area.  Oxycodone and Atarax given for pain.  Patient has Lactated Ringer's running at 125 mL per hour. Ovalle in place draining clear, sami colored urine. Capno on, 1 LPM. Compression devices on. Ambulated in hallway with staff. Reports passing gas. Urology following. Plan is to remove ovalle tomorrow and discharge home.

## 2023-08-17 NOTE — PLAN OF CARE
PRIMARY DIAGNOSIS: s/p cystoscopy w/ bilateral stent placement   OUTPATIENT/OBSERVATION GOALS TO BE MET BEFORE DISCHARGE:  1. Stable vital signs Yes  2. Tolerating diet:Yes  3. Pain controlled with oral pain medications:  Yes  4. Positive bowel sounds:  Yes  5. Voiding without difficulty:   Ovalle in place   6. Able to ambulate:  Yes  7. Provider specific discharge goals met:  No    Discharge Planner Nurse   Safe discharge environment identified: Yes  Barriers to discharge: Yes       Entered by: Latrice Wade RN 08/16/2023 9:40 PM     Please review provider order for any additional goals.   Nurse to notify provider when observation goals have been met and patient is ready for discharge.    Vitals are Temp: 98.3  F (36.8  C) Temp src: Oral BP: (!) 164/85 Pulse: 84   Resp: 19 SpO2: 94 %.  Patient is Alert and Oriented x4. They are SBA with no assistive devices .  Pt is a Regular diet.  Tolerating well. They are complaining of 5/10 headache pain and some soreness to perineal area.  Tylenol given for pain.  Patient has Lactated Ringer's running at 125 mL per hour. Capno on, 2 LPM. Compression devices on. Reports passing gas. Reports some nausea, declines need for antiemetic. Urology following. Plan is to remove ovalle tomorrow and discharge home.

## 2023-08-17 NOTE — TELEPHONE ENCOUNTER
Patient is scheduled for surgery with Dr. cardenas    Spoke with: gene    Date of Surgery: [id number]    Location: [facility]    Informed patient they will need an adult  y    Pre op with Provider not needed    H&P: Scheduled with not needed    Additional imaging/appointments: n    Surgery packet: I mailed     Additional comments: per sdb do not schedule follow-up at this time        Nuvia Tapia on 8/17/2023 at 10:41 AM

## 2023-08-18 NOTE — PROGRESS NOTES
"Clinic Care Coordination Contact  [facility]: Post-Discharge Note  SITUATION                                                      Admission:    Admission Date: 08/16/23   Reason for Admission: Bilateral kidney stones  Discharge:   Discharge Date: 08/17/23  Discharge Diagnosis: Bilateral kidney stones    BACKGROUND                                                      Per hospital discharge summary and inpatient provider notes:  Katharina Mejias is a 63 year old female with PMH significant for HTN, current tobacco abuse, chronic shortness of breath, chronic back pain, GERD, and hyperlipidemia who presents to the ED for the second time in two days for ongoing nausea and dry heaving.     ED workup reveals: hypertensive otherwise VSS, negative influenza A/B, COVID, RSV, creatinine of 1.27, calcium of 11.0, glucose of 126, mild leukocytosis of 11.8, UA shows 20 of ketones, small blood, 10 of protein, few bacteria, mucus present, 74 RBCs, 8 WBC, and 2 squamous epithelium, and CT of abdomen/pelvis shows 2 adjacent 2 to 3 mm right proximal/mid ureteral calculi resulting in moderate upstream dilatation, additional 3 mm left proximal/mid ureteral calculus resulting in mild to moderate upstream dilatation, bilateral punctate nonobstructing calculi, abnormally thick endometrium, 1.1 cm, for postmenopausal patient with internal heterogenicity and possible small polyp, recommend follow-up with nonemergent pelvic ultrasound.    ASSESSMENT           Discharge Assessment  How are you doing now that you are home?: \" Better pain is okay so far today\"  Do you feel your condition is stable enough to be safe at home until your provider visit?: Yes  Does the patient have their discharge instructions? : Yes  Does the patient have questions regarding their discharge instructions? : No  Were you started on any new medications or were there changes to any of your previous medications? : Yes  Does the patient have all of their " medications?: Yes  Do you have questions regarding any of your medications? : No  Do you have all of your needed medical supplies or equipment (DME)?  (i.e. oxygen tank, CPAP, cane, etc.): Yes  Discharge follow-up appointment scheduled within 14 calendar days? : No  Is patient agreeable to assistance with scheduling? : No    Post-op ([facility] Only)  If the patient had a surgery or procedure, do they have any questions for a nurse?: No             PLAN                                                      Outpatient Plan:  Activity instructions  Diet instructions  Your Next Steps  Carl Mejias (MRN: [id number])  Printed at 8/17/2023 3:46 PM Page 2 of 7  Diet instructions (continued)  Follow this diet upon discharge: Orders Placed This Encounter  Regular Diet Adult  Discharge Instructions  1. Resume Losartan but continue to hold Spironolactone until recheck of kidney function in 5-7 days  Follow-up and recommended labs and tests  Follow up with primary care provider within 7 days for hospital follow-up. The following labs/tests are recommended:  BMP.    No future appointments.      For any urgent concerns, please contact our 24 hour nurse triage line: [phone] ([phone])         Luz Maria Ramos MA

## 2023-08-22 NOTE — TELEPHONE ENCOUNTER
Please call patient to schedule a hospital follow up within one week with any available provider-patient asking to call er tomorrow, 8/23.  (Ok to print the paperwork in the attachment in the  message for the appointment)    Libby Hernandez RN

## 2023-08-22 NOTE — TELEPHONE ENCOUNTER
Patient needs an appointment for hospital follow up-FMLA forms can be filled out at the appointment.    Patient will need labs at the appointment.    8/16/2023 - 8/17/2023 (32 hours) [facility]: Bilateral ureteral stones   -was in TCU after    Discharge plan: You will need outpatient follow-up with urology for definitive stone management in a couple weeks, you should be contacted to set this up. It is recommended you discuss with your primary care provider about outpatient sleep study to assess for sleep apnea. Based on your kidney function it is recommended your hold your Spironolactone for now until your kidney function is rechecked with your primary care provider.     Patient has to see urology as well for stone management.      Message to patient that she needs to schedule a hospital follow up ( will need labs as she is off spironolactone now) and the FMLA paperwork can be filled out at the appointment, needs to also schedule with urology.    Waiting reply.  Please schedule appointment when patient replies or calls.    Libby Hernandez RN

## 2023-08-24 NOTE — PATIENT INSTRUCTIONS
Use your albuterol inhaler scheduled every 4 -6 hours for the next 2-3 days, then use every 4-6 hours as needed for cough, wheeze, or shortness of breath.  Chest x-ray today to rule out pneumonia    Check your urine for infection    Recheck your kidney function and if it looks ok we will resume the spironolactone

## 2023-08-24 NOTE — PROGRESS NOTES
Assessment & Plan     Ureteral stone  Has upcoming procedure scheduled with urology, feels meds are helpful. Feels much better.  - oxyBUTYnin (DITROPAN) 5 MG tablet; Take 1 tablet (5 mg) by mouth 3 times daily as needed for bladder spasms    Dysuria  No infection on UA, mild, likely from stents/procedure.  - Basic metabolic panel  (Ca, Cl, CO2, Creat, Gluc, K, Na, BUN); Future  - UA Macroscopic with reflex to Microscopic and Culture - Lab Collect; Future  - Basic metabolic panel  (Ca, Cl, CO2, Creat, Gluc, K, Na, BUN)  - UA Macroscopic with reflex to Microscopic and Culture - Lab Collect  - UA Microscopic with Reflex to Culture    Tobacco abuse  Wheezing  History of wheezing  Plan to increase albuterol use to 3-4 x per day. If no improvement, to consider short course of prednisone. Discuss with PCP if PFTs vs daily inhaler as next step are preferred.  - albuterol (PROAIR HFA/PROVENTIL HFA/VENTOLIN HFA) 108 (90 Base) MCG/ACT inhaler; Inhale 2 puffs into the lungs every 4 hours as needed for shortness of breath or wheezing  - XR Chest 2 Views; Future    Essential hypertension  BP improved on recheck, if kidney function improved can resume PTA spironolactone.  - Basic metabolic panel  (Ca, Cl, CO2, Creat, Gluc, K, Na, BUN); Future  - Basic metabolic panel  (Ca, Cl, CO2, Creat, Gluc, K, Na, BUN)    MAVIS (acute kidney injury) (H)  Recheck labs today    Lymphedema  Feels this has worsened since stopping spironolactone, continue with elevation and resume meds as soon as able.  - Basic metabolic panel  (Ca, Cl, CO2, Creat, Gluc, K, Na, BUN); Future  - Basic metabolic panel  (Ca, Cl, CO2, Creat, Gluc, K, Na, BUN)    Dry mouth  Likely from oxybutynin she feels manageable for now.    Nausea and vomiting, unspecified vomiting type  Resolved.            MED REC REQUIRED  Post Medication Reconciliation Status:  Discharge medications reconciled and changed, see notes/orders    Patient Instructions   Use your albuterol inhaler  "scheduled every 4 -6 hours for the next 2-3 days, then use every 4-6 hours as needed for cough, wheeze, or shortness of breath.  Chest x-ray today to rule out pneumonia    Check your urine for infection    Recheck your kidney function and if it looks ok we will resume the spironolactone        Ciara Mishra NP  [facility] ABY Henry is a 63 year old, presenting for the following health issues:  Hospital F/U        8/24/2023     4:07 PM   Additional Questions   Roomed by Ally Rodríguez CMA   Accompanied by ERIK ORTEZ         8/18/2023    10:42 AM   Post Discharge Outreach   Admission Date 8/16/2023   Reason for Admission Bilateral kidney stones   Discharge Date 8/17/2023   Discharge Diagnosis Bilateral kidney stones   How are you doing now that you are home? \" Better pain is okay so far today\"   Do you feel your condition is stable enough to be safe at home until your provider visit? Yes   Does the patient have their discharge instructions?  Yes   Does the patient have questions regarding their discharge instructions?  No   Were you started on any new medications or were there changes to any of your previous medications?  Yes   Does the patient have all of their medications? Yes   Do you have questions regarding any of your medications?  No   Do you have all of your needed medical supplies or equipment (DME)?  (i.e. oxygen tank, CPAP, cane, etc.) Yes   Discharge follow-up appointment scheduled within 14 calendar days?  No     Hospital Follow-up Visit:    Hospital/Nursing Home/IP Rehab Facility: [facility]  Date of Admission: 8/16/23  Date of Discharge: 8/17/23  Reason(s) for Admission: Bilateral ureteral stones  S/p cystoscopy, bilateral retrograde pyelograms, and ureteral stent placement  MAVIS  HTN    Was your hospitalization related to COVID-19? No   Problems taking medications regularly:  None  Medication changes since discharge: has been holding " spironolactone  Problems adhering to non-medication therapy:  None    Summary of hospitalization:  [facility] discharge summary reviewed  Diagnostic Tests/Treatments reviewed.  Follow up needed: urology  Other Healthcare Providers Involved in Patient s Care:          urology  Update since discharge: improved.       Has cystoscopy scheduled on 9/13  Plan of care communicated with patient and family      Tired, low energy  Dry mouth  Short of breath, typically uses albuterol every 2-3 months. With air quality and pollen has increased use. Wheezing with walking. Wheezing before hospital,better with inhaler.  Ribs were sore upon leaving the hospital, probably from dry heaves. Better/resolved.  Occasional burning sensation when urinating  Had some blood in urine, but none the past 2 days  Denies high fevers, low grade 99s    FMLA paperwork  Works as , works from home  Missed work 1/2 day 8/14 and 8/18 and all week 8/15-8/18  Also will miss work for upcoming procedure    Review of Systems         Objective    /60 (BP Location: Right arm, Patient Position: Sitting, Cuff Size: Adult Large)   Pulse 96   Temp 99.6  F (37.6  C) (Tympanic)   Resp 16   Wt (!) 162.4 kg (358 lb)   LMP 07/09/2007 (LMP Unknown)   SpO2 93%   BMI 56.07 kg/m    Body mass index is 56.07 kg/m .  Physical Exam   GENERAL: healthy, alert and no distress  RESP: no wheezes and decreased breath sounds bilateral bases  CV: regular rate and rhythm, normal S1 S2, no S3 or S4, no murmur, click or rub, b/l lower extremity edema, non-pitting    Results for orders placed or performed in visit on 08/24/23 (from the past 24 hour(s))   UA Macroscopic with reflex to Microscopic and Culture - Lab Collect    Specimen: Urine, Midstream   Result Value Ref Range    Color Urine Yellow Colorless, Straw, Light Yellow, Yellow    Appearance Urine Clear Clear    Glucose Urine Negative Negative mg/dL    Bilirubin Urine Negative Negative     Ketones Urine Negative Negative mg/dL    Specific Gravity Urine 1.020 1.003 - 1.035    Blood Urine Moderate (A) Negative    pH Urine 6.5 5.0 - 7.0    Protein Albumin Urine >=300 (A) Negative mg/dL    Urobilinogen Urine 1.0 0.2, 1.0 E.U./dL    Nitrite Urine Negative Negative    Leukocyte Esterase Urine Small (A) Negative   UA Microscopic with Reflex to Culture   Result Value Ref Range    Bacteria Urine Few (A) None Seen /HPF    RBC Urine >100 (A) 0-2 /HPF /HPF    WBC Urine 0-5 0-5 /HPF /HPF    Squamous Epithelials Urine Few (A) None Seen /LPF    Mucus Urine Present (A) None Seen /LPF    Narrative    Urine Culture not indicated

## 2023-08-24 NOTE — Clinical Note
Hi. Pt looking for [facility] paperwork for previous and upcoming urology visits. She has had this done by Dr Finley but hasn't seen him in clinic. IS this something he could fill out? Or would he defer to  us?

## 2023-08-24 NOTE — Clinical Note
Rafael Graf. I saw your pt for hosp f/up. One issue she brought up is chronic shortness of breath that she typically has used albuterol for every 2-3 mos and that's been helpful. The past 1 mos, prior to hospitalization, she has been more short of breath and wheezing, now using inhlaer 1-2x per day. Not wheezing in clinic but O2 sats on lower side, CXR normal. Current smoker. I do wonder about COPD but she's never had PFTs done. For now, I told her to increase the inhaler to 3-4x per day. What are your thoughts on next steps? Would you want her to start at daily inhaler or do PFTs? I was thinking if the increased inhaler use doesn't help in the next 1-2 days to also give her a short course of prednisone. Thanks!

## 2023-08-25 NOTE — TELEPHONE ENCOUNTER
"Ciara Mishra NP  P Ea Sb3/5 Pal C (Rn)  Hi. Pt looking for FMLA paperwork for previous and upcoming urology visits. She has had this done by Dr Finley but hasn't seen him in clinic. IS this something he could fill out? Or would he defer to  us?     - Patient had an appointment with Ciara Mishra NP yesterday (8/24/2023)    - 8/24/2023 Office Visit with Ciara Mishra NP states, \"FMLA paperwork: Works as , works from home. Missed work 1/2 day 8/14 and 8/18 and all week 8/15-8/18. Also will miss work for upcoming procedure\"   - Patient has an upcoming urology procedure for Cystoscopy, bilateral ureteral stent removal.  Bilateral ureteroscopy with laser lithotripsy and stone basketing, possible bilateral ureteral stent replacement on 9/13/2023 with Dr. Finley (Urology)   - Patient has an upcoming appointment with Dr. Finley on 9/18/2023     Dr. Finley, patient is seeking FMLA paperwork for previous and upcoming urology visits. Is this something you can complete for the patient or would you defer to primary care?     Shelley IVY RN   [facility]   "

## 2023-08-28 NOTE — TELEPHONE ENCOUNTER
Are you okay with doing FMLA paper work on this patient, she is has an upcoming procedure with .  Her PMD clinic is calling inquiring if we are able to do this. Shelley IRAHETA [phone]  Bernadine Colon LPN

## 2023-08-28 NOTE — TELEPHONE ENCOUNTER
Called the [facility] at [phone] and left a nurse on the care team of Dr. Finley   - Provided a detailed message stating that the the patient is seeking [facility] paperwork for previous and upcoming urology visits   - Patient is wondering if this is something Dr. Calderon can complete for the patient or would he defer to primary care   - Provided PAL RN direct line   - Awaiting a call back at this time     Shelley IVY RN   [facility]

## 2023-08-29 NOTE — TELEPHONE ENCOUNTER
PAL-we can wait to hear from Dr. Finley. Otherwise, I believe Dr. Graf is back today and has the forms. Would she be willing to fill out or does she agree defer to specialist? I typically haven't been filling out FMLA paperwork as extender.  I think the pt needs these forms done by next week if I recall correctly.  Ciara Mishra, APRN, CNP

## 2023-08-29 NOTE — TELEPHONE ENCOUNTER
The forms are attached to patient's my-chart message on 8/21/23.    I printed the forms and faxed to attnTeressa Loera with Dr Finley's office at FAX: [phone].    Libby Hernandez RN

## 2023-08-29 NOTE — TELEPHONE ENCOUNTER
Received a call from MYRTLE Loera on the care team of Dr. Finley  - MYRTLE Loera states that Dr. Finley will complete the FMLA paperwork for the patient   - MYRTLE Loera requesting the FMLA paperwork be faxed to: [phone]     Ciara Mishra NP, do you have the patient's FMLA paperwork?     Shelley IVY RN   [facility]

## 2023-08-29 NOTE — TELEPHONE ENCOUNTER
See the 8/25/2023 Telephone Encounter.   - Received a call from MYRTLE Loera on the care team of Dr. Finley  - MYRTLE Loera states that Dr. Finley will complete the [facility] paperwork for the patient   - MYRTLE Loera requesting the [facility] paperwork be faxed to: [phone]     Closing this encounter as it is being addressed in the 8/25/2023 Telephone Encounter.     Shelley IVY RN   [facility]

## 2023-09-01 NOTE — TELEPHONE ENCOUNTER
Routing refill request to provider for review/approval because:  Request for 90 day prescription. Pharmacy needs DX code  Tejal CALDWELL RN

## 2023-09-01 NOTE — TELEPHONE ENCOUNTER
It looks like this medication was ordered for short term use on 8/24/23 for prn bladder spasms and not for chronic use. Is he still needing and why?  Alana Mendez PA-C on 9/1/2023 at 2:29 PM

## 2023-09-06 NOTE — TELEPHONE ENCOUNTER
Sent a message to patient informing her that the refill was sent.  Asking patient is there is anything else she needed as I can't tell from her message what she is asking about. With new symptoms, or if requesting new medication, appointment is required.  Informed patient of this, waiting for reply.  Libby Hernandez RN

## 2023-09-06 NOTE — ED NOTES
Discharged home with daughter, reminded to follow-up with urology. PT and family did not have any questions or concerns noted upon departure.

## 2023-09-06 NOTE — ED TRIAGE NOTES
Pt comes in with back pain since yesterday. Pt says hx of kidney stones and this feels like a stone.

## 2023-09-06 NOTE — ED PROVIDER NOTES
History   Chief Complaint:  Back pain    HPI   History supplemented by electronic chart review    Katharina Mejias is a 63 year old female who presents with her daughter for evaluation of back pain.  She was recently hospitalized for bilateral ureteral stents, and has had some ongoing dysuria since that time.  However, she otherwise has felt well, but yesterday while carrying some laundry, which she acknowledges was greater than a 10 pound weight limit she was advised to keep, she experienced recurrent back pain more on the left than the right but present bilaterally, as well as some left lower quadrant pain.  She has had several episodes of nonbloody vomiting overnight for which she took a dose of Zofran ODT at 3 AM.  She is also been taking 2 pills of Tylenol every 6 hours with partial relief.  She had a bowel movement this morning which was benign.  No measured fevers.  She has not contacted her urology team about this, but is worried that something might of moved with her stents, and volunteers that she is planning to fly to [place] tomorrow for work and is apprehensive about this.  She was supposed to have her ureteral stents removed yesterday but this was rescheduled for September 13.  She is no longer on oxybutynin and is not taking oxycodone either or Flomax.  She states she is currently feeling okay and does not think she needs any medication    Independent Historian: Patient's daughter at bedside, who reports that the patient is worried about possibly traveling by plane tomorrow.    Review of External Notes: I personally reviewed her discharge summary from the hospitalist service on August 17 after having bilateral ureteral stents placed by the urology team of Dr. Finley.  I also personally reviewed the , she was prescribed 8 oxycodone several weeks ago.      Medications:    albuterol (PROAIR HFA/PROVENTIL HFA/VENTOLIN HFA) 108 (90 Base) MCG/ACT inhaler  diphenhydrAMINE (BENADRYL) 25 MG  tablet  famotidine (PEPCID) 20 MG tablet  losartan (COZAAR) 100 MG tablet  multivitamin w/minerals (THERA-VIT-M) tablet  order for DME  rosuvastatin (CRESTOR) 5 MG tablet  spironolactone (ALDACTONE) 50 MG tablet    Past Medical History:    Past Medical History:   Diagnosis Date    Acute and subacute iridocyclitis, unspecified 8/05, 4/06    Ankylosing spondylitis (H)     Hypertension     Lumbago     Other and unspecified ovarian cyst 1985    Other cataract     Tobacco use disorder        Patient Active Problem List    Diagnosis Date Noted    Ureteral stone 08/16/2023     Priority: Medium    Bilateral kidney stones 08/16/2023     Priority: Medium    Nausea and vomiting, unspecified vomiting type 08/16/2023     Priority: Medium    Lymphedema associated with obesity 10/04/2021     Priority: Medium    Anxiety 10/08/2018     Priority: Medium    Lymphedema 05/21/2018     Priority: Medium    Dyslipidemia 02/26/2018     Priority: Medium    Class 3 severe obesity due to excess calories with serious comorbidity and body mass index (BMI) of 45.0 to 49.9 in adult (H) 12/28/2017     Priority: Medium    Essential hypertension 10/02/2017     Priority: Medium    ACP (advance care planning) 06/27/2017     Priority: Medium     Advance Care Planning 6/27/2017: ACP Review of Chart / Resources Provided:  Reviewed chart for advance care plan.  Katharina Mejias has no plan or code status on file. Code status updated and sent to provider for review. Advance Care Planning letter sent. Added by Ludmila Lagos            CARDIOVASCULAR SCREENING; LDL GOAL LESS THAN 160 02/10/2010     Priority: Medium    Ankylosing spondylitis (H)      Priority: Medium      Physical Exam   Patient Vitals for the past 24 hrs:   BP Temp Temp src Pulse Resp SpO2 Height Weight   09/06/23 1151 -- -- -- -- 16 -- -- --   09/06/23 1054 -- -- -- -- -- 95 % -- --   09/06/23 1030 134/70 -- -- 96 -- 93 % -- --   09/06/23 1000 123/80 -- -- 92 -- 93 % -- --   09/06/23 0940  "125/68 -- -- 96 -- 95 % -- --   09/06/23 0900 121/72 -- -- 103 -- -- -- --   09/06/23 0720 -- -- -- 110 -- 93 % -- --   09/06/23 0715 127/68 98.2  F (36.8  C) Oral -- 18 -- -- --   09/06/23 0700 (!) 155/82 97.9  F (36.6  C) Oral 116 18 98 % 1.702 m (5' 7\") (!) 162.4 kg (358 lb)      Physical Exam  General: Nontoxic-appearing woman sitting upright in room 13, daughter at bedside  HENT: mucous membranes moist  CV: rate as above, regular rhythm, thick legs bilaterally but no pitting edema  Resp: normal effort, speaks in full phrases, no stridor, no cough observed  GI: abdomen soft and nontender, protuberant, no guarding, no palpable masses  MSK:   Thoracic spine: nontender, no CVAT  Lumbar spine: nontender  Pelvis stable.  : deferred  Skin: appropriately warm and dry, no erythema/ecchymosis/vesicles to back  Neuro: alert, clear speech, oriented  Psych: cooperative, pleasant    Emergency Department Course   Imaging:  CT Abdomen Pelvis w/o Contrast   Final Result   IMPRESSION:    1.  Bilateral ureteral stents have been placed, and are in good   position with resolved bilateral hydronephrosis. Mild inflammatory   stranding around the ureters.   2.  Few unchanged small calculi along the course of the stents in the   upper ureters bilaterally. Stable tiny nonobstructing right renal   calculi.      ASHLEY SIERRA MD            SYSTEM ID:  [id number]         Laboratory:  Labs Ordered and Resulted from Time of ED Arrival to Time of ED Departure   ROUTINE UA WITH MICROSCOPIC REFLEX TO CULTURE - Abnormal       Result Value    Color Urine Orange (*)     Appearance Urine Cloudy (*)     Glucose Urine 30 (*)     Bilirubin Urine Negative      Ketones Urine Negative      Specific Gravity Urine 1.019      Blood Urine Large (*)     pH Urine 6.5      Protein Albumin Urine 200 (*)     Urobilinogen Urine Normal      Nitrite Urine Negative      Leukocyte Esterase Urine Large (*)     Bacteria Urine Few (*)     RBC Urine >182 (*)     WBC " Urine 104 (*)     Squamous Epithelials Urine 3 (*)    BASIC METABOLIC PANEL - Abnormal    Sodium 139      Potassium 4.5      Chloride 104      Carbon Dioxide (CO2) 26      Anion Gap 9      Urea Nitrogen 15.6      Creatinine 1.04 (*)     Calcium 10.7 (*)     Glucose 134 (*)     GFR Estimate 60 (*)    CBC WITH PLATELETS AND DIFFERENTIAL    WBC Count 8.7      RBC Count 4.17      Hemoglobin 12.7      Hematocrit 39.2      MCV 94      MCH 30.5      MCHC 32.4      RDW 13.3      Platelet Count 333      % Neutrophils 78      % Lymphocytes 14      % Monocytes 6      % Eosinophils 0      % Basophils 1      % Immature Granulocytes 1      NRBCs per 100 WBC 0      Absolute Neutrophils 6.9      Absolute Lymphocytes 1.2      Absolute Monocytes 0.5      Absolute Eosinophils 0.0      Absolute Basophils 0.1      Absolute Immature Granulocytes 0.0      Absolute NRBCs 0.0     URINE CULTURE        Emergency Department Course:  Reviewed:  I reviewed nursing notes, vitals, and past medical history    Assessments/Consultations/Discussion of Management or Tests :  I obtained history and examined the patient as noted above.   ED Course as of 09/06/23 2137   Wed Sep 06, 2023   1030 I rechecked patient, feeling well currently.   1038 I spoke with China Tobias, Urology.   1130 I rechecked patient, discussed Urology recommendations.         Interventions:  Medications   0.9% sodium chloride BOLUS (0 mLs Intravenous Stopped 9/6/23 1141)        Social Determinants of Health affecting care:   Healthcare Access/Compliance    Disposition:  Discharged    Impression & Plan    Medical Decision Making:  The patient was concerned about the possibility of stent migration or other structural complication from her recent ureteral stones and stenting, this was certainly considered along with other possibilities including pyelonephritis, perforated viscus, aortic pathology and many others.  Muscular strain also considered given some lifting, though this was  just laundry and not necessarily a particularly impressive mechanism.  She does have some pyuria, I spoke with her on-call urology team who reviewed her results today as well and did not feel that she necessarily needed antibiotics, this is thought to be secondary to the stent itself.  She has no fevers.  She is tolerating oral intake here, has no peritonitis, and declined my offer of additional treatments here in the emergency department.  She does not feel she needs to be hospitalized.  She is already scheduled for an appointment in 1 week to remove her ureteral stents and further urologic intervention, she was advised to keep this and she and her daughter agree with this plan as well.  She had tentative plans to travel out of state for work tomorrow, in light of her symptoms I think it is most prudent that she defer this and provided written documentation to support her work accommodations.  She has found oxybutynin to be especially helpful, not oxycodone, so additional oxybutynin was prescribed to her and she will use this as well as continuing Flomax.  Return here for high fevers, unbearable pain, prolonged vomiting or any other acute concerns.  She was discharged home in improved condition after discussion of the above.    Diagnosis:    ICD-10-CM    1. Renal colic  N23       2. Ureteral stent present  Z96.0       3. Abnormal urinalysis  R82.90          Discharge Prescriptions:  Discharge Medication List as of 9/6/2023 11:42 AM        START taking these medications    Details   !! oxyBUTYnin (DITROPAN) 5 MG tablet Take 1 tablet (5 mg) by mouth 3 times daily as needed for bladder spasms, Disp-21 tablet, R-0, E-Prescribe       !! - Potential duplicate medications found. Please discuss with provider.          9/6/2023   MD Constance Flores Jeffrey Alan, MD  09/06/23 213       Jose Nolasco MD  09/06/23 2136

## 2023-09-06 NOTE — LETTER
Katharina Mejias was seen and treated in our emergency department on 9/6/2023.  She may return to work on 09/07/2023.  You are cleared to work, but should not travel until cleared by your Urologist.     If you have any questions or concerns, please don't hesitate to call.      Jose Nolasco MD

## 2023-09-07 NOTE — LETTER
[facility] CARE COORDINATION  [address] [place] [address]    September 7, 2023    Katharina Mejias  [address]  [place] [address]      Dear Katharina,        I am a  clinic community health worker who works with Ayla Graf MD with the [facility]. I wanted to thank you for spending the time to talk with me.  Below is a description of clinic care coordination and how we can further assist you.       The clinic care coordination team is made up of a registered nurse, , financial resource worker and community health worker who understand the health care system. The goal of clinic care coordination is to help you manage your health and improve access to the health care system. Our team works alongside your provider to assist you in determining your health and social needs. We can help you obtain health care and community resources, providing you with necessary information and education. We can work with you through any barriers and develop a care plan that helps coordinate and strengthen the communication between you and your care team.  Our services are voluntary and are offered without charge to you personally.    If you wish to connect with the Clinic Care Coordination Team, please let your [facility] Primary Care Provider or Clinic Care Team know and they can place a referral. The Clinic Care Coordination team will then reach out by phone to further support you.    We are focused on providing you with the highest-quality healthcare experience possible.    Sincerely,   Your care team with [facility]

## 2023-09-07 NOTE — PROGRESS NOTES
Clinic Care Coordination Contact  Community Health Worker Initial Outreach    CHW Initial Information Gathering:  Referral Source: ED Follow-Up  Preferred Hospital: [facility]  [phone]  Preferred Urgent Care: [facility] [place], [phone]  No PCP office visit in Past Year: No       Patient accepts CC: No, due to the patient stating that at this time there are no concerns for CCC to assist with. Patient will be sent Care Coordination introduction letter for future reference.     MARIAELENA Wilson  [phone]  Connected [facility] Center  [facility]

## 2023-11-19 DIAGNOSIS — E78.5 DYSLIPIDEMIA: ICD-10-CM

## 2023-11-19 DIAGNOSIS — I10 ESSENTIAL HYPERTENSION: ICD-10-CM

## 2023-11-19 RX ORDER — ROSUVASTATIN CALCIUM 5 MG/1
5 TABLET, COATED ORAL DAILY
Qty: 90 TABLET | Refills: 1 | OUTPATIENT
Start: 2023-11-19

## (undated) DEVICE — PREP SCRUB SOL EXIDINE 4% CHG 4OZ 29002-404

## (undated) DEVICE — PACK CYSTO CUSTOM RIDGES

## (undated) DEVICE — BAG CLEAR TRASH 1.3M 39X33" P4040C

## (undated) DEVICE — LINEN HALF SHEET 5512

## (undated) DEVICE — RAD RX ISOVUE 300 (50ML) 61% IOPAMIDOL CHARGE PER ML

## (undated) DEVICE — CATH URETERAL FLEX TIP TIGERTAIL 06FRX70CM 139006

## (undated) DEVICE — GLOVE BIOGEL PI ULTRATOUCH SZ 7.5 41175

## (undated) DEVICE — CATH TRAY FOLEY SURESTEP 16FR DRAIN BAG STATOCK A899916

## (undated) DEVICE — GUIDEWIRE URO STR STIFF .035"X150CM NITINOL 150NSS35

## (undated) DEVICE — TUBING IRRIG TUR Y TYPE 96" LF 6543-01

## (undated) DEVICE — SOL WATER IRRIG 1000ML BOTTLE 2F7114

## (undated) RX ORDER — CEFAZOLIN SODIUM/WATER 3 G/30 ML
SYRINGE (ML) INTRAVENOUS
Status: DISPENSED
Start: 2023-01-01

## (undated) RX ORDER — CIPROFLOXACIN 2 MG/ML
INJECTION, SOLUTION INTRAVENOUS
Status: DISPENSED
Start: 2023-01-01

## (undated) RX ORDER — LIDOCAINE HYDROCHLORIDE 10 MG/ML
INJECTION, SOLUTION EPIDURAL; INFILTRATION; INTRACAUDAL; PERINEURAL
Status: DISPENSED
Start: 2023-01-01

## (undated) RX ORDER — GLYCOPYRROLATE 0.2 MG/ML
INJECTION INTRAMUSCULAR; INTRAVENOUS
Status: DISPENSED
Start: 2023-01-01

## (undated) RX ORDER — DEXAMETHASONE SODIUM PHOSPHATE 4 MG/ML
INJECTION, SOLUTION INTRA-ARTICULAR; INTRALESIONAL; INTRAMUSCULAR; INTRAVENOUS; SOFT TISSUE
Status: DISPENSED
Start: 2023-01-01

## (undated) RX ORDER — FENTANYL CITRATE 50 UG/ML
INJECTION, SOLUTION INTRAMUSCULAR; INTRAVENOUS
Status: DISPENSED
Start: 2023-01-01

## (undated) RX ORDER — ONDANSETRON 2 MG/ML
INJECTION INTRAMUSCULAR; INTRAVENOUS
Status: DISPENSED
Start: 2023-01-01